# Patient Record
Sex: FEMALE | Race: WHITE | HISPANIC OR LATINO | Employment: PART TIME | URBAN - METROPOLITAN AREA
[De-identification: names, ages, dates, MRNs, and addresses within clinical notes are randomized per-mention and may not be internally consistent; named-entity substitution may affect disease eponyms.]

---

## 2017-01-26 DIAGNOSIS — E11.9 TYPE 2 DIABETES MELLITUS WITHOUT COMPLICATIONS (H): Primary | ICD-10-CM

## 2017-01-26 NOTE — TELEPHONE ENCOUNTER
Patient due for follow up DM appointment.  Please assist in scheduling and route back to refills.      LOV NOTES:6/30/2016  Assessment and Plan:      (E11.9) Type 2 diabetes mellitus without complications (H)  (primary encounter diagnosis)  Comment: she has been noncompliant with meds. She will sent up an AM/PM pill box and try to work on being better  Plan: f/u early Oct for a weight and A1c    Belinda Schwartz RN  Triage [employer]        Metformin         Last Written Prescription Date: 8/1/2016  Last Fill Quantity: 180, # refills: 1  Last Office Visit with [facility], [facility] or [facility] prescribing provider:  6/30/2016        BP Readings from Last 3 Encounters:   06/30/16 126/79   12/21/15 124/84   11/09/15 135/94     MICROL       10   6/29/2016  No results found for this basename: microalbumin  CREATININE   Date Value Ref Range Status   06/29/2016 0.65 0.52 - 1.04 mg/dL Final   ]  GFR ESTIMATE   Date Value Ref Range Status   06/29/2016 >90  Non  GFR Calc   >60 mL/min/1.7m2 Final   06/01/2015 >90  Non  GFR Calc   >60 mL/min/1.7m2 Final   11/21/2014 >90  Non  GFR Calc   >60 mL/min/1.7m2 Final     GFR ESTIMATE IF BLACK   Date Value Ref Range Status   06/29/2016 >90   GFR Calc   >60 mL/min/1.7m2 Final   06/01/2015 >90   GFR Calc   >60 mL/min/1.7m2 Final   11/21/2014 >90   GFR Calc   >60 mL/min/1.7m2 Final     CHOL      153   6/29/2016  HDL       61   6/29/2016  LDL       65   6/29/2016  TRIG      136   6/29/2016  CHOLHDLRATIO      3.1   6/1/2015  AST       24   6/29/2016  ALT       43   6/29/2016  A1C      6.8   9/27/2016  A1C      7.2   6/20/2016  A1C      7.1   12/18/2015  A1C      6.3   6/1/2015  A1C      6.7   11/21/2014  POTASSIUM   Date Value Ref Range Status   06/29/2016 4.2 3.4 - 5.3 mmol/L Final     Amaryl         Last Written Prescription Date: 8/1/2016  Last Fill Quantity: 90, # refills: 1  Last Office Visit with [facility],  [facility] or  [facility] prescribing provider:  6/30/2016        BP Readings from Last 3 Encounters:   06/30/16 126/79   12/21/15 124/84   11/09/15 135/94     MICROL       10   6/29/2016  No results found for this basename: microalbumin  CREATININE   Date Value Ref Range Status   06/29/2016 0.65 0.52 - 1.04 mg/dL Final   ]  GFR ESTIMATE   Date Value Ref Range Status   06/29/2016 >90  Non  GFR Calc   >60 mL/min/1.7m2 Final   06/01/2015 >90  Non  GFR Calc   >60 mL/min/1.7m2 Final   11/21/2014 >90  Non  GFR Calc   >60 mL/min/1.7m2 Final     GFR ESTIMATE IF BLACK   Date Value Ref Range Status   06/29/2016 >90   GFR Calc   >60 mL/min/1.7m2 Final   06/01/2015 >90   GFR Calc   >60 mL/min/1.7m2 Final   11/21/2014 >90   GFR Calc   >60 mL/min/1.7m2 Final     CHOL      153   6/29/2016  HDL       61   6/29/2016  LDL       65   6/29/2016  TRIG      136   6/29/2016  CHOLHDLRATIO      3.1   6/1/2015  AST       24   6/29/2016  ALT       43   6/29/2016  A1C      6.8   9/27/2016  A1C      7.2   6/20/2016  A1C      7.1   12/18/2015  A1C      6.3   6/1/2015  A1C      6.7   11/21/2014  POTASSIUM   Date Value Ref Range Status   06/29/2016 4.2 3.4 - 5.3 mmol/L Final

## 2017-01-30 ENCOUNTER — DOCUMENTATION ONLY (OUTPATIENT)
Dept: LAB | Facility: CLINIC | Age: 57
End: 2017-01-30

## 2017-01-30 DIAGNOSIS — E11.9 TYPE 2 DIABETES MELLITUS WITHOUT COMPLICATION, WITHOUT LONG-TERM CURRENT USE OF INSULIN (H): Primary | ICD-10-CM

## 2017-01-30 DIAGNOSIS — E78.5 HYPERLIPIDEMIA LDL GOAL <100: ICD-10-CM

## 2017-01-30 NOTE — PROGRESS NOTES
I did pend orders, but she needs to see me this month to review results as well.  Call and help her make appt

## 2017-01-31 DIAGNOSIS — E78.5 HYPERLIPIDEMIA LDL GOAL <100: ICD-10-CM

## 2017-01-31 DIAGNOSIS — E11.9 TYPE 2 DIABETES MELLITUS WITHOUT COMPLICATION, WITHOUT LONG-TERM CURRENT USE OF INSULIN (H): ICD-10-CM

## 2017-01-31 LAB
CHOLEST SERPL-MCNC: 165 MG/DL
HBA1C MFR BLD: 7.2 % (ref 4.3–6)
HDLC SERPL-MCNC: 59 MG/DL
LDLC SERPL CALC-MCNC: 75 MG/DL
NONHDLC SERPL-MCNC: 106 MG/DL
TRIGL SERPL-MCNC: 154 MG/DL

## 2017-01-31 PROCEDURE — 80061 LIPID PANEL: CPT | Performed by: PHYSICIAN ASSISTANT

## 2017-01-31 PROCEDURE — 83036 HEMOGLOBIN GLYCOSYLATED A1C: CPT | Performed by: PHYSICIAN ASSISTANT

## 2017-01-31 PROCEDURE — 36415 COLL VENOUS BLD VENIPUNCTURE: CPT | Performed by: PHYSICIAN ASSISTANT

## 2017-02-03 RX ORDER — GLIMEPIRIDE 1 MG/1
TABLET ORAL
Qty: 90 TABLET | Refills: 1 | Status: SHIPPED | OUTPATIENT
Start: 2017-02-03 | End: 2017-02-13 | Stop reason: DRUGHIGH

## 2017-02-03 NOTE — TELEPHONE ENCOUNTER
Patient scheduled for future appt with Luz Maria Nilam  Prescription approved per [facility] Refill Protocol.  Bernie Castro RN

## 2017-02-03 NOTE — TELEPHONE ENCOUNTER
Second request from pharmacy  Appointment with Ambrosio on 2/13/17  Please approve  RT Shahla (R)

## 2017-02-13 ENCOUNTER — OFFICE VISIT (OUTPATIENT)
Dept: FAMILY MEDICINE | Facility: CLINIC | Age: 57
End: 2017-02-13
Payer: COMMERCIAL

## 2017-02-13 VITALS
SYSTOLIC BLOOD PRESSURE: 122 MMHG | DIASTOLIC BLOOD PRESSURE: 84 MMHG | WEIGHT: 152.8 LBS | TEMPERATURE: 96.9 F | HEIGHT: 63 IN | HEART RATE: 92 BPM | BODY MASS INDEX: 27.07 KG/M2

## 2017-02-13 DIAGNOSIS — E11.9 TYPE 2 DIABETES MELLITUS WITHOUT COMPLICATION, WITHOUT LONG-TERM CURRENT USE OF INSULIN (H): Primary | ICD-10-CM

## 2017-02-13 PROCEDURE — 99213 OFFICE O/P EST LOW 20 MIN: CPT | Performed by: PHYSICIAN ASSISTANT

## 2017-02-13 RX ORDER — GLIMEPIRIDE 2 MG/1
2 TABLET ORAL
Qty: 30 TABLET | Refills: 3 | Status: SHIPPED | OUTPATIENT
Start: 2017-02-13 | End: 2017-07-23

## 2017-02-13 NOTE — PROGRESS NOTES
HPI: 55 yo female here for f/u diabetes  She is walking for exercise 2.8mi about 4 days per week  Her weight is up compared to summertime; likes more salty snacks later at night  No longer on [employer] and encouraged her to restart that.  She is checking her blood sugars and in the am running in the 160s  She is compliant now with taking her metformin and amaryl  She is due for her eye exam.  Denies any numbness or tingling in toes or fingers.  Denies sxs of hypoglycemia.    Past Medical History   Diagnosis Date     Atypical squamous cells of undetermined significance (ASCUS) on Papanicolaou smear of cervix 10/9/2015     GERD (gastroesophageal reflux disease)      HTN (hypertension)      Hyperlipidemia      Type 2 diabetes mellitus without complications (H) 10/22/2015     Past Surgical History   Procedure Laterality Date     C lap ovarian cystotomy  1976     salpingooopherectomy for large dermoid     Appendectomy  1976     at time of USO     Arthroscopy knee rt/lt       Colonoscopy N/A 11/9/2015     Procedure: COLONOSCOPY;  Surgeon: Kate Redmond MD;  Location:  GI     Social History   Substance Use Topics     Smoking status: Never Smoker     Smokeless tobacco: Never Used     Alcohol use No     Current Outpatient Prescriptions   Medication Sig Dispense Refill     glimepiride (AMARYL) 2 MG tablet Take 1 tablet (2 mg) by mouth every morning (before breakfast) 30 tablet 3     metFORMIN (GLUCOPHAGE) 1000 MG tablet TAKE ONE TABLET BY MOUTH TWICE A DAY WITH MEALS 180 tablet 1     [DISCONTINUED] glimepiride (AMARYL) 1 MG tablet TAKE ONE TABLET BY MOUTH EVERY MORNING BEFORE BREAKFAST 90 tablet 1     lisinopril (PRINIVIL,ZESTRIL) 10 MG tablet Take 0.5 tablets (5 mg) by mouth daily 45 tablet 2     simvastatin (ZOCOR) 40 MG tablet TAKE ONE AND ONE-HALF TABLETS BY MOUTH DAILY 135 tablet 2     blood glucose monitoring (NO BRAND SPECIFIED) test strip Test twice daily 1 Box 12     Urea 40 % CREA applt ohands and feet  "daily 198.6 g 11     OMEPRAZOLE PO        aspirin 81 MG tablet Take 1 tablet (81 mg) by mouth daily 90 tablet 3     Multiple Vitamin (MULTIVITAMIN OR) Take 1 tablet by mouth daily.       Allergies   Allergen Reactions     Keflex [Cephalexin Hcl] Hives     Penicillins Hives     Ragweeds      Sneezing etc.     FAMILY HISTORY NOTED AND REVIEWED    PHYSICAL EXAM:    /84 (BP Location: Right arm, Cuff Size: Adult Regular)  Pulse 92  Temp 96.9  F (36.1  C) (Oral)  Ht 5' 2.5\" (1.588 m)  Wt 152 lb 12.8 oz (69.3 kg)  BMI 27.5 kg/m2    Patient appears non toxic    Results for orders placed or performed in visit on 01/31/17   Hemoglobin A1c   Result Value Ref Range    Hemoglobin A1C 7.2 (H) 4.3 - 6.0 %   Lipid Profile   Result Value Ref Range    Cholesterol 165 <200 mg/dL    Triglycerides 154 (H) <150 mg/dL    HDL Cholesterol 59 >49 mg/dL    LDL Cholesterol Calculated 75 <100 mg/dL    Non HDL Cholesterol 106 <130 mg/dL         Assessment and Plan:     (E11.9) Type 2 diabetes mellitus without complication, without long-term current use of insulin (H)  (primary encounter diagnosis)  Comment: LDL at goal.  A1c went up with her wt gain. She will try to monitor diet more closely. Recd fewer carbs, more protein. Increased amaryl to 2mg qd.  Recheck A1c in 3 months. See me in 6 months.  Plan: glimepiride (AMARYL) 2 MG tablet, Hemoglobin         A1c              Luz Maria Demarco PA-C      "

## 2017-02-13 NOTE — NURSING NOTE
"Chief Complaint   Patient presents with     Results       Initial /84 (BP Location: Right arm, Cuff Size: Adult Regular)  Pulse 92  Temp 96.9  F (36.1  C) (Oral)  Ht 5' 2.5\" (1.588 m)  Wt 152 lb 12.8 oz (69.3 kg)  BMI 27.5 kg/m2 Estimated body mass index is 27.5 kg/(m^2) as calculated from the following:    Height as of this encounter: 5' 2.5\" (1.588 m).    Weight as of this encounter: 152 lb 12.8 oz (69.3 kg).  Medication Reconciliation: complete  "

## 2017-02-13 NOTE — MR AVS SNAPSHOT
After Visit Summary   2/13/2017    Rubi Nicolas    MRN: [id number]           Patient Information     Date Of Birth          1960        Visit Information        Provider Department      2/13/2017 9:30 AM Luz Maria Demarco PA-C [facility]        Today's Diagnoses     Type 2 diabetes mellitus without complication, without long-term current use of insulin (H)    -  1       Follow-ups after your visit        Future tests that were ordered for you today     Open Future Orders        Priority Expected Expires Ordered    Hemoglobin A1c Routine 5/15/2017 2/13/2018 2/13/2017            Who to contact     If you have questions or need follow up information about today's clinic visit or your schedule please contact [facility] directly at [phone].  Normal or non-critical lab and imaging results will be communicated to you by Gen9hart, letter or phone within 4 business days after the clinic has received the results. If you do not hear from us within 7 days, please contact the clinic through Gen9hart or phone. If you have a critical or abnormal lab result, we will notify you by phone as soon as possible.  Submit refill requests through Solyndra or call your pharmacy and they will forward the refill request to us. Please allow 3 business days for your refill to be completed.          Additional Information About Your Visit        MyChart Information     Solyndra gives you secure access to your electronic health record. If you see a primary care provider, you can also send messages to your care team and make appointments. If you have questions, please call your primary care clinic.  If you do not have a primary care provider, please call [phone] and they will assist you.        Care EveryWhere ID     This is your Care EveryWhere ID. This could be used by other organizations to access your [place] medical records  [id number]        Your Vitals Were     Pulse Temperature Height  "BMI (Body Mass Index)          92 96.9  F (36.1  C) (Oral) 5' 2.5\" (1.588 m) 27.5 kg/m2         Blood Pressure from Last 3 Encounters:   02/13/17 122/84   06/30/16 126/79   12/21/15 124/84    Weight from Last 3 Encounters:   02/13/17 152 lb 12.8 oz (69.3 kg)   06/30/16 151 lb (68.5 kg)   12/21/15 149 lb 6.4 oz (67.8 kg)                 Today's Medication Changes          These changes are accurate as of: 2/13/17 10:03 AM.  If you have any questions, ask your nurse or doctor.               These medicines have changed or have updated prescriptions.        Dose/Directions    * glimepiride 1 MG tablet   Commonly known as:  AMARYL   This may have changed:  Another medication with the same name was added. Make sure you understand how and when to take each.   Used for:  Type 2 diabetes mellitus without complications (H)   Changed by:  Luz Maria Demarco PA-C        TAKE ONE TABLET BY MOUTH EVERY MORNING BEFORE BREAKFAST   Quantity:  90 tablet   Refills:  1       * glimepiride 2 MG tablet   Commonly known as:  AMARYL   This may have changed:  You were already taking a medication with the same name, and this prescription was added. Make sure you understand how and when to take each.   Used for:  Type 2 diabetes mellitus without complication, without long-term current use of insulin (H)   Changed by:  Luz Maria Demarco PA-C        Dose:  2 mg   Take 1 tablet (2 mg) by mouth every morning (before breakfast)   Quantity:  30 tablet   Refills:  3       * Notice:  This list has 2 medication(s) that are the same as other medications prescribed for you. Read the directions carefully, and ask your doctor or other care provider to review them with you.         Where to get your medicines      These medications were sent to [facility] [id number] [facility] [place] - [address]  [address] [place] [address]     Phone:  [phone]     glimepiride 2 MG tablet                Primary Care Provider Office Phone # Fax #    Luz Maria Demarco, " BRANDON [phone]       [facility] [address] [facility] [address]  [facility] [address]        Thank you!     Thank you for choosing [facility]  for your care. Our goal is always to provide you with excellent care. Hearing back from our patients is one way we can continue to improve our services. Please take a few minutes to complete the written survey that you may receive in the mail after your visit with us. Thank you!             Your Updated Medication List - Protect others around you: Learn how to safely use, store and throw away your medicines at www.disposemymeds.org.          This list is accurate as of: 2/13/17 10:03 AM.  Always use your most recent med list.                   Brand Name Dispense Instructions for use    aspirin 81 MG tablet     90 tablet    Take 1 tablet (81 mg) by mouth daily       blood glucose monitoring test strip    no brand specified    1 Box    Test twice daily       * glimepiride 1 MG tablet    AMARYL    90 tablet    TAKE ONE TABLET BY MOUTH EVERY MORNING BEFORE BREAKFAST       * glimepiride 2 MG tablet    AMARYL    30 tablet    Take 1 tablet (2 mg) by mouth every morning (before breakfast)       lisinopril 10 MG tablet    PRINIVIL/ZESTRIL    45 tablet    Take 0.5 tablets (5 mg) by mouth daily       metFORMIN 1000 MG tablet    GLUCOPHAGE    180 tablet    TAKE ONE TABLET BY MOUTH TWICE A DAY WITH MEALS       MULTIVITAMIN PO      Take 1 tablet by mouth daily.       OMEPRAZOLE PO          simvastatin 40 MG tablet    ZOCOR    135 tablet    TAKE ONE AND ONE-HALF TABLETS BY MOUTH DAILY       Urea 40 % Crea     198.6 g    applt ohands and feet daily       * Notice:  This list has 2 medication(s) that are the same as other medications prescribed for you. Read the directions carefully, and ask your doctor or other care provider to review them with you.

## 2017-02-20 ENCOUNTER — HOSPITAL ENCOUNTER (OUTPATIENT)
Dept: MAMMOGRAPHY | Facility: CLINIC | Age: 57
Discharge: HOME | End: 2017-02-20
Attending: PHYSICIAN ASSISTANT | Admitting: PHYSICIAN ASSISTANT
Payer: COMMERCIAL

## 2017-02-20 DIAGNOSIS — Z12.31 VISIT FOR SCREENING MAMMOGRAM: ICD-10-CM

## 2017-02-20 PROCEDURE — G0202 SCR MAMMO BI INCL CAD: HCPCS

## 2017-05-19 DIAGNOSIS — I10 ESSENTIAL HYPERTENSION WITH GOAL BLOOD PRESSURE LESS THAN 140/90: ICD-10-CM

## 2017-05-19 DIAGNOSIS — E78.5 HYPERLIPIDEMIA LDL GOAL <100: ICD-10-CM

## 2017-05-19 RX ORDER — SIMVASTATIN 40 MG
TABLET ORAL
Qty: 135 TABLET | Refills: 2 | Status: SHIPPED | OUTPATIENT
Start: 2017-05-19 | End: 2018-02-07

## 2017-05-19 RX ORDER — LISINOPRIL 10 MG/1
TABLET ORAL
Qty: 45 TABLET | Refills: 2 | Status: SHIPPED | OUTPATIENT
Start: 2017-05-19 | End: 2018-02-07

## 2017-05-19 NOTE — TELEPHONE ENCOUNTER
Prescription approved per [facility] Refill Protocol.  Kriss Cordero RN

## 2017-05-19 NOTE — TELEPHONE ENCOUNTER
Pending Prescriptions:                       Disp   Refills    simvastatin (ZOCOR) 40 MG tablet [Pharmacy*135 ta*2        Sig: TAKE ONE AND ONE-HALF TABLETS BY MOUTH DAILY    lisinopril (PRINIVIL/ZESTRIL) 10 MG tablet*45 tab*2        Sig: TAKE ONE-HALF TABLET BY MOUTH DAILY       Simvastatin  Last Written Prescription Date: 08/30/2016  Last Fill Quantity: 135, # refills: 2  Last Office Visit with [facility] or [facility] prescribing provider: 02/13/2017       Lab Results   Component Value Date    CHOL 165 01/31/2017     Lab Results   Component Value Date    HDL 59 01/31/2017     Lab Results   Component Value Date    LDL 75 01/31/2017     Lab Results   Component Value Date    TRIG 154 01/31/2017     Lab Results   Component Value Date    CHOLHDLRATIO 3.1 06/01/2015     Lisinopril      Last Written Prescription Date: 08/31/2016  Last Fill Quantity: 45, # refills: 2  Last Office Visit with [facility], [facility] or [facility] prescribing provider: 02/13/2017       Potassium   Date Value Ref Range Status   06/29/2016 4.2 3.4 - 5.3 mmol/L Final     Creatinine   Date Value Ref Range Status   06/29/2016 0.65 0.52 - 1.04 mg/dL Final     BP Readings from Last 3 Encounters:   02/13/17 122/84   06/30/16 126/79   12/21/15 124/84

## 2017-05-24 ENCOUNTER — TELEPHONE (OUTPATIENT)
Dept: FAMILY MEDICINE | Facility: CLINIC | Age: 57
End: 2017-05-24

## 2017-05-24 NOTE — TELEPHONE ENCOUNTER
Reason for Call:  Lab orders    Detailed comments: Patient scheduled to come in for her A1C Labs  Please place orders in her Chart  Thank You    Phone Number Patient can be reached at: Home number on file [phone] (home)    Best Time: anytime    Can we leave a detailed message on this number? YES    Call taken on 5/24/2017 at 10:08 AM by Kodak Barahona

## 2017-05-30 DIAGNOSIS — E11.9 TYPE 2 DIABETES MELLITUS WITHOUT COMPLICATION, WITHOUT LONG-TERM CURRENT USE OF INSULIN (H): ICD-10-CM

## 2017-05-30 LAB — HBA1C MFR BLD: 7.1 % (ref 4.3–6)

## 2017-05-30 PROCEDURE — 83036 HEMOGLOBIN GLYCOSYLATED A1C: CPT | Performed by: PHYSICIAN ASSISTANT

## 2017-05-30 PROCEDURE — 36415 COLL VENOUS BLD VENIPUNCTURE: CPT | Performed by: PHYSICIAN ASSISTANT

## 2017-05-31 NOTE — PROGRESS NOTES
Rubi,    Your hemoglobin A1c is a little better at 7.1%.  I will see you in about 3 months.    Luz Maria Demarco PA-C

## 2017-07-23 DIAGNOSIS — E11.9 TYPE 2 DIABETES MELLITUS WITHOUT COMPLICATION, WITHOUT LONG-TERM CURRENT USE OF INSULIN (H): ICD-10-CM

## 2017-07-24 RX ORDER — GLIMEPIRIDE 2 MG/1
2 TABLET ORAL
Qty: 30 TABLET | Refills: 0 | Status: SHIPPED | OUTPATIENT
Start: 2017-07-24 | End: 2017-08-21

## 2017-07-24 NOTE — TELEPHONE ENCOUNTER
Pending Prescriptions:                       Disp   Refills    glimepiride (AMARYL) 2 MG tablet [Pharmac*30 tab*3            Sig: TAKE 1 TABLET BY MOUTH EVERY MORNING BEFORE           BREAKFAST.             Last Written Prescription Date: 2/13/17  Last Fill Quantity: 30, # refills: 3  Last Office Visit with [facility], [facility] or [facility] prescribing provider:  2/13/17        BP Readings from Last 3 Encounters:   02/13/17 122/84   06/30/16 126/79   12/21/15 124/84     Lab Results   Component Value Date    MICROL 10 06/29/2016     Lab Results   Component Value Date    UMALCR 8.58 06/29/2016     Creatinine   Date Value Ref Range Status   06/29/2016 0.65 0.52 - 1.04 mg/dL Final   ]  GFR Estimate   Date Value Ref Range Status   06/29/2016 >90  Non  GFR Calc   >60 mL/min/1.7m2 Final   06/01/2015 >90  Non  GFR Calc   >60 mL/min/1.7m2 Final   11/21/2014 >90  Non  GFR Calc   >60 mL/min/1.7m2 Final     GFR Estimate If Black   Date Value Ref Range Status   06/29/2016 >90   GFR Calc   >60 mL/min/1.7m2 Final   06/01/2015 >90   GFR Calc   >60 mL/min/1.7m2 Final   11/21/2014 >90   GFR Calc   >60 mL/min/1.7m2 Final     Lab Results   Component Value Date    CHOL 165 01/31/2017     Lab Results   Component Value Date    HDL 59 01/31/2017     Lab Results   Component Value Date    LDL 75 01/31/2017     Lab Results   Component Value Date    TRIG 154 01/31/2017     Lab Results   Component Value Date    CHOLHDLRATIO 3.1 06/01/2015     Lab Results   Component Value Date    AST 24 06/29/2016     Lab Results   Component Value Date    ALT 43 06/29/2016     Lab Results   Component Value Date    A1C 7.1 05/30/2017    A1C 7.2 01/31/2017    A1C 6.8 09/27/2016    A1C 7.2 06/20/2016    A1C 7.1 12/18/2015     Potassium   Date Value Ref Range Status   06/29/2016 4.2 3.4 - 5.3 mmol/L Final

## 2017-07-24 NOTE — TELEPHONE ENCOUNTER
Medication is being filled for 1 time refill only due to: patient is due for labs now. Patient will be due for office visit 8/2017.    Molly HENDERSON RN

## 2017-07-25 ENCOUNTER — OFFICE VISIT (OUTPATIENT)
Dept: FAMILY MEDICINE | Facility: CLINIC | Age: 57
End: 2017-07-25
Payer: COMMERCIAL

## 2017-07-25 VITALS
OXYGEN SATURATION: 98 % | WEIGHT: 149.7 LBS | TEMPERATURE: 99.2 F | DIASTOLIC BLOOD PRESSURE: 75 MMHG | BODY MASS INDEX: 26.52 KG/M2 | HEIGHT: 63 IN | SYSTOLIC BLOOD PRESSURE: 126 MMHG | HEART RATE: 84 BPM

## 2017-07-25 DIAGNOSIS — R30.0 DYSURIA: Primary | ICD-10-CM

## 2017-07-25 DIAGNOSIS — N30.01 ACUTE CYSTITIS WITH HEMATURIA: ICD-10-CM

## 2017-07-25 LAB
ALBUMIN UR-MCNC: NEGATIVE MG/DL
APPEARANCE UR: CLEAR
BILIRUB UR QL STRIP: NEGATIVE
COLOR UR AUTO: YELLOW
GLUCOSE UR STRIP-MCNC: 500 MG/DL
HGB UR QL STRIP: ABNORMAL
KETONES UR STRIP-MCNC: NEGATIVE MG/DL
LEUKOCYTE ESTERASE UR QL STRIP: ABNORMAL
NITRATE UR QL: NEGATIVE
NON-SQ EPI CELLS #/AREA URNS LPF: ABNORMAL /LPF
PH UR STRIP: 6 PH (ref 5–7)
RBC #/AREA URNS AUTO: ABNORMAL /HPF (ref 0–2)
SP GR UR STRIP: <=1.005 (ref 1–1.03)
URN SPEC COLLECT METH UR: ABNORMAL
UROBILINOGEN UR STRIP-ACNC: 0.2 EU/DL (ref 0.2–1)
WBC #/AREA URNS AUTO: ABNORMAL /HPF (ref 0–2)

## 2017-07-25 PROCEDURE — 81001 URINALYSIS AUTO W/SCOPE: CPT | Performed by: FAMILY MEDICINE

## 2017-07-25 PROCEDURE — 87088 URINE BACTERIA CULTURE: CPT | Performed by: FAMILY MEDICINE

## 2017-07-25 PROCEDURE — 99213 OFFICE O/P EST LOW 20 MIN: CPT | Performed by: FAMILY MEDICINE

## 2017-07-25 PROCEDURE — 87186 SC STD MICRODIL/AGAR DIL: CPT | Performed by: FAMILY MEDICINE

## 2017-07-25 PROCEDURE — 87086 URINE CULTURE/COLONY COUNT: CPT | Performed by: FAMILY MEDICINE

## 2017-07-25 RX ORDER — SULFAMETHOXAZOLE/TRIMETHOPRIM 800-160 MG
1 TABLET ORAL 2 TIMES DAILY
Qty: 6 TABLET | Refills: 0 | Status: SHIPPED | OUTPATIENT
Start: 2017-07-25 | End: 2017-07-28

## 2017-07-25 NOTE — MR AVS SNAPSHOT
"              After Visit Summary   7/25/2017    Rubi Nicolas    MRN: [id number]           Patient Information     Date Of Birth          1960        Visit Information        Provider Department      7/25/2017 10:30 AM Siomara Echevarria MD [facility]        Today's Diagnoses     Dysuria    -  1    Acute cystitis with hematuria           Follow-ups after your visit        Who to contact     If you have questions or need follow up information about today's clinic visit or your schedule please contact [facility] [place] directly at [phone].  Normal or non-critical lab and imaging results will be communicated to you by Stylythart, letter or phone within 4 business days after the clinic has received the results. If you do not hear from us within 7 days, please contact the clinic through Stylythart or phone. If you have a critical or abnormal lab result, we will notify you by phone as soon as possible.  Submit refill requests through Campus Job or call your pharmacy and they will forward the refill request to us. Please allow 3 business days for your refill to be completed.          Additional Information About Your Visit        MyChart Information     Campus Job gives you secure access to your electronic health record. If you see a primary care provider, you can also send messages to your care team and make appointments. If you have questions, please call your primary care clinic.  If you do not have a primary care provider, please call [phone] and they will assist you.        Care EveryWhere ID     This is your Care EveryWhere ID. This could be used by other organizations to access your [place] medical records  [id number]        Your Vitals Were     Pulse Temperature Height Pulse Oximetry BMI (Body Mass Index)       84 99.2  F (37.3  C) (Oral) 5' 2.5\" (1.588 m) 98% 26.94 kg/m2        Blood Pressure from Last 3 Encounters:   07/25/17 126/75   02/13/17 122/84   06/30/16 126/79    Weight from " Last 3 Encounters:   07/25/17 149 lb 11.2 oz (67.9 kg)   02/13/17 152 lb 12.8 oz (69.3 kg)   06/30/16 151 lb (68.5 kg)              We Performed the Following     UA with Microscopic reflex to Culture     Urine Culture Aerobic Bacterial          Today's Medication Changes          These changes are accurate as of: 7/25/17 11:59 PM.  If you have any questions, ask your nurse or doctor.               Start taking these medicines.        Dose/Directions    sulfamethoxazole-trimethoprim 800-160 MG per tablet   Commonly known as:  BACTRIM DS/SEPTRA DS   Used for:  Acute cystitis with hematuria   Started by:  Siomara Echevarria MD        Dose:  1 tablet   Take 1 tablet by mouth 2 times daily for 3 days   Quantity:  6 tablet   Refills:  0            Where to get your medicines      These medications were sent to [facility] [address] [facility] - [place], [place] - [address]  [address], [place] [address]     Phone:  [phone]     sulfamethoxazole-trimethoprim 800-160 MG per tablet                Primary Care Provider Office Phone # Fax #    Luz Maria Demarco PA-C [phone]       [facility] [place] [address]  [place] [address]        Equal Access to Services     ABRIL ODONNELL AH: Hadii laura ku hadasho Soomaali, waaxda luqadaha, qaybta kaalmada adeegyada, waxay idiin haytim anderson. So [facility] [phone].    ATENCIÓN: Si habla español, tiene a king disposición servicios gratuitos de asistencia lingüística. Llame al [phone].    We comply with applicable federal civil rights laws and [place] laws. We do not discriminate on the basis of race, color, national origin, age, disability sex, sexual orientation or gender identity.            Thank you!     Thank you for choosing [facility]  for your care. Our goal is always to provide you with excellent care. Hearing back from our patients is one way we can continue to improve our services. Please take a few minutes to complete  the written survey that you may receive in the mail after your visit with us. Thank you!             Your Updated Medication List - Protect others around you: Learn how to safely use, store and throw away your medicines at www.disposemymeds.org.          This list is accurate as of: 7/25/17 11:59 PM.  Always use your most recent med list.                   Brand Name Dispense Instructions for use Diagnosis    aspirin 81 MG tablet     90 tablet    Take 1 tablet (81 mg) by mouth daily        blood glucose monitoring test strip    no brand specified    1 Box    Test twice daily    Type 2 diabetes, HbA1c goal < 7% (H)       glimepiride 2 MG tablet    AMARYL    30 tablet    Take 1 tablet (2 mg) by mouth every morning (before breakfast) Please schedule office visit to discuss further refills    Type 2 diabetes mellitus without complication, without long-term current use of insulin (H)       lisinopril 10 MG tablet    PRINIVIL/ZESTRIL    45 tablet    TAKE ONE-HALF TABLET BY MOUTH DAILY    Essential hypertension with goal blood pressure less than 140/90       MULTIVITAMIN PO      Take 1 tablet by mouth daily.        OMEPRAZOLE PO           simvastatin 40 MG tablet    ZOCOR    135 tablet    TAKE ONE AND ONE-HALF TABLETS BY MOUTH DAILY    Hyperlipidemia LDL goal <100       sulfamethoxazole-trimethoprim 800-160 MG per tablet    BACTRIM DS/SEPTRA DS    6 tablet    Take 1 tablet by mouth 2 times daily for 3 days    Acute cystitis with hematuria       Urea 40 % Crea     198.6 g    applt ohands and feet daily    Palmoplantar keratoderma

## 2017-07-25 NOTE — NURSING NOTE
"Chief Complaint   Patient presents with     UTI       Initial /75  Pulse 84  Temp 99.2  F (37.3  C) (Oral)  Ht 5' 2.5\" (1.588 m)  Wt 149 lb 11.2 oz (67.9 kg)  SpO2 98%  BMI 26.94 kg/m2 Estimated body mass index is 26.94 kg/(m^2) as calculated from the following:    Height as of this encounter: 5' 2.5\" (1.588 m).    Weight as of this encounter: 149 lb 11.2 oz (67.9 kg).  Medication Reconciliation: complete      Health Maintenance that is potentially due pending provider review:  NONE    n/a    EFRAÍN Wang  "

## 2017-07-25 NOTE — PROGRESS NOTES
SUBJECTIVE:                                                    Rubi Nicolas is a 56 year old female who presents to clinic today for the following health issues:      URINARY TRACT SYMPTOMS      Duration: Started this morning     Description  dysuria, frequency, urgency and back pain    Intensity:  mild    Accompanying signs and symptoms:  Fever/chills: no   Flank pain no   Nausea and vomiting: no   Vaginal symptoms: none  Abdominal/Pelvic Pain: no     History  History of frequent UTI's: no, had one last summer    History of kidney stones: no   Sexually Active: YES  Possibility of pregnancy: No    Precipitating or alleviating factors: None    Therapies tried and outcome: increase fluid intake      Back pain- minimal, diffuse.    Sx's last year- similar sx's.  Spasm bladder sx's, hurts a bit when she pees.  Woke up last night at 3am, then really increased her water intake, so increased freq.    No fevers/chills.  Stools normal.  Sexually active- Sunday.  Correlated last time.  Also biking with pading/sweating- 40 miles yesterday and last week.        Problem list and histories reviewed & adjusted, as indicated.  Additional history: as documented    Patient Active Problem List   Diagnosis     HTN (hypertension)     Hyperlipidemia LDL goal <100     GERD (gastroesophageal reflux disease)     Overweight (BMI 25.0-29.9)     Abnormal AST and ALT     Obesity     Atypical squamous cells of undetermined significance (ASCUS) on Papanicolaou smear of cervix     Type 2 diabetes mellitus without complications (H)      Current Outpatient Prescriptions   Medication Sig Dispense Refill     glimepiride (AMARYL) 2 MG tablet Take 1 tablet (2 mg) by mouth every morning (before breakfast) Please schedule office visit to discuss further refills 30 tablet 0     simvastatin (ZOCOR) 40 MG tablet TAKE ONE AND ONE-HALF TABLETS BY MOUTH DAILY 135 tablet 2     lisinopril (PRINIVIL/ZESTRIL) 10 MG tablet TAKE ONE-HALF TABLET BY MOUTH DAILY 45  tablet 2     blood glucose monitoring (NO BRAND SPECIFIED) test strip Test twice daily 1 Box 12     Urea 40 % CREA applt ohands and feet daily 198.6 g 11     OMEPRAZOLE PO        aspirin 81 MG tablet Take 1 tablet (81 mg) by mouth daily 90 tablet 3     Multiple Vitamin (MULTIVITAMIN OR) Take 1 tablet by mouth daily.       metFORMIN (GLUCOPHAGE) 1000 MG tablet Take 1 tablet (1,000 mg) by mouth 2 times daily (with meals) . Due for 6 month diabetes follow up. Call [phone] to schedule. 60 tablet 0     ciprofloxacin (CIPRO) 500 MG tablet Take 1 tablet (500 mg) by mouth 2 times daily 6 tablet 0     Allergies   Allergen Reactions     Keflex [Cephalexin Hcl] Hives     Penicillins Hives     Ragweeds      Sneezing etc.     Recent Labs   Lab Test  05/30/17   0941  01/31/17   0812  09/27/16   0758  06/29/16   1012   06/01/15   0752  12/01/14   1538  11/21/14   0918   10/19/12   0858   A1C  7.1*  7.2*  6.8*   --    < >  6.3*   --   6.7*   < >  7.3*   LDL   --   75   --   65   --   88   --   73   < >  93   HDL   --   59   --   61   --   55   --   52   < >  57   TRIG   --   154*   --   136   --   129   --   134   < >  141   ALT   --    --    --   43   --   27   --   22   < >   --    CR   --    --    --   0.65   --   0.63   --   0.62   < >  0.70   GFRESTIMATED   --    --    --   >90  Non  GFR Calc     --   >90  Non  GFR Calc     --   >90  Non  GFR Calc     < >  88   GFRESTBLACK   --    --    --   >90   GFR Calc     --   >90   GFR Calc     --   >90   GFR Calc     < >  >90   POTASSIUM   --    --    --   4.2   --   4.3   --   4.3   < >  4.2   TSH   --    --    --    --    --    --   3.28   --    --   3.78    < > = values in this interval not displayed.      Labs reviewed in EPIC      Reviewed and updated as needed this visit by clinical staffTobacco  Allergies  Meds  Problems       Reviewed and updated as needed this visit by  "Provider  Meds  Problems         ROS:  Constitutional, HEENT, cardiovascular, pulmonary, gi and gu systems are negative, except as otherwise noted.      OBJECTIVE:   /75  Pulse 84  Temp 99.2  F (37.3  C) (Oral)  Ht 5' 2.5\" (1.588 m)  Wt 149 lb 11.2 oz (67.9 kg)  SpO2 98%  BMI 26.94 kg/m2  Body mass index is 26.94 kg/(m^2).  GENERAL APPEARANCE: healthy, alert and no distress     EYES: PERRL, sclera clear     HENT: nose and mouth without ulcers or lesions     NECK: no adenopathy, no asymmetry, masses, or scars and thyroid normal to palpation     RESP: lungs clear to auscultation - no rales, rhonchi or wheezes     CV: regular rates and rhythm, normal S1 S2, no S3 or S4 and no murmur, click or rub      Abdomen: soft, nontender, no HSM or masses and bowel sounds normal     Ext: warm, dry, no edema      Back: no flank pain to percussion    Diagnostic Test Results:  Results for orders placed or performed in visit on 07/25/17   UA with Microscopic reflex to Culture   Result Value Ref Range    Color Urine Yellow     Appearance Urine Clear     Glucose Urine 500 (A) NEG mg/dL    Bilirubin Urine Negative NEG    Ketones Urine Negative NEG mg/dL    Specific Gravity Urine <=1.005 1.003 - 1.035    pH Urine 6.0 5.0 - 7.0 pH    Protein Albumin Urine Negative NEG mg/dL    Urobilinogen Urine 0.2 0.2 - 1.0 EU/dL    Nitrite Urine Negative NEG    Blood Urine Large (A) NEG    Leukocyte Esterase Urine Trace (A) NEG    Source Midstream Urine     WBC Urine O - 2 0 - 2 /HPF    RBC Urine 10-25 (A) 0 - 2 /HPF    Squamous Epithelial /LPF Urine Few FEW /LPF   Urine Culture Aerobic Bacterial   Result Value Ref Range    Specimen Description Midstream Urine     Culture Micro (A)      >100,000 colonies/mL Coagulase negative Staphylococcus    Micro Report Status FINAL 07/27/2017     Organism:       >100,000 colonies/mL Coagulase negative Staphylococcus       Susceptibility    >100,000 colonies/ml coagulase negative staphylococcus (marianna) -  " (no method available)     CIPROFLOXACIN <=0.5 Susceptible  ug/mL     GENTAMICIN <=0.5 Susceptible  ug/mL     LEVOFLOXACIN 0.5 Susceptible  ug/mL     NITROFURANTOIN <=16 Susceptible  ug/mL     OXACILLIN <=0.25 Susceptible  ug/mL     PENICILLIN 0.25 Resistant  ug/mL     TETRACYCLINE <=1 Susceptible  ug/mL     VANCOMYCIN <=0.5 Susceptible  ug/mL       ASSESSMENT/PLAN:       ICD-10-CM    1. Dysuria R30.0 UA with Microscopic reflex to Culture     Urine Culture Aerobic Bacterial     *UA reflex to Microscopic and Culture ([place] [facility] (except [place] and [place])   2. Acute cystitis with hematuria N30.01 sulfamethoxazole-trimethoprim (BACTRIM DS/SEPTRA DS) 800-160 MG per tablet     Acute cystitis sx's of frequency and bladder spasms, similar to last year with positive UA/UC (and txt with cipro effective), but now with mostly RBC's and no WBC's on UA.  No other cause of sx's seems likely, however, based on sx's or exam.  Discussed issues with pt- would be willing to send in abx rx to start in case sx's worsen, but could also wait and recheck UA tomorrow to see if then more c/w with UTI.  Sent in rx for bactrim, but also placed future UA order in case she'd like to wait.    Risks and benefits of medication(s) including potential side effects reviewed with patient.  Questions answered.     Siomara Echevarria MD  [facility]

## 2017-07-27 ENCOUNTER — TELEPHONE (OUTPATIENT)
Dept: FAMILY MEDICINE | Facility: CLINIC | Age: 57
End: 2017-07-27

## 2017-07-27 DIAGNOSIS — N30.01 ACUTE CYSTITIS WITH HEMATURIA: Primary | ICD-10-CM

## 2017-07-27 DIAGNOSIS — E11.9 TYPE 2 DIABETES MELLITUS WITHOUT COMPLICATIONS (H): ICD-10-CM

## 2017-07-27 LAB
BACTERIA SPEC CULT: ABNORMAL
MICRO REPORT STATUS: ABNORMAL
MICROORGANISM SPEC CULT: ABNORMAL
SPECIMEN SOURCE: ABNORMAL

## 2017-07-27 RX ORDER — CIPROFLOXACIN 500 MG/1
500 TABLET, FILM COATED ORAL 2 TIMES DAILY
Qty: 6 TABLET | Refills: 0 | Status: SHIPPED | OUTPATIENT
Start: 2017-07-27 | End: 2017-09-27

## 2017-07-27 NOTE — PROGRESS NOTES
Called and discussed ucx results-   UA with negative WBCs, but UCx positive now for staph organism and is susceptible to cipro and other abx (but bactrim not tested).  Pt states she just took the first bactrim an hour ago due to persistent, low-grade sx's.    Rec stopping the bactrim, and starting a cipro course- will do BID x 3 days.  Send to pharmacy- Risks and benefits of medication(s) including potential side effects reviewed with patient.  Questions answered.   Call/rtc if sx's not improving.  CW

## 2017-07-28 NOTE — TELEPHONE ENCOUNTER
Patient is due for 6 month diabetes check  I called and left VM for patient to call clinic back.   Sent prescription for 30 day supply. Patient due for 6 months diabetes check.    Bernie Castro RN

## 2017-08-21 DIAGNOSIS — E11.9 TYPE 2 DIABETES MELLITUS WITHOUT COMPLICATION, WITHOUT LONG-TERM CURRENT USE OF INSULIN (H): ICD-10-CM

## 2017-08-21 NOTE — TELEPHONE ENCOUNTER
Pending Prescriptions:                       Disp   Refills    glimepiride (AMARYL) 2 MG tablet [Pharmac*30 tab*0            Sig: TAKE 1 TABLET BY MOUTH EVERY MORNING BEFORE           BREAKFAST. MAKE APPT FOR FURTHER REFILLS.             Last Written Prescription Date: 7/24/17  Last Fill Quantity: 30, # refills: 0  Last Office Visit with [facility], [facility] or [facility] prescribing provider:  2/13/17        BP Readings from Last 3 Encounters:   07/25/17 126/75   02/13/17 122/84   06/30/16 126/79     Lab Results   Component Value Date    MICROL 10 06/29/2016     Lab Results   Component Value Date    UMALCR 8.58 06/29/2016     Creatinine   Date Value Ref Range Status   06/29/2016 0.65 0.52 - 1.04 mg/dL Final   ]  GFR Estimate   Date Value Ref Range Status   06/29/2016 >90  Non  GFR Calc   >60 mL/min/1.7m2 Final   06/01/2015 >90  Non  GFR Calc   >60 mL/min/1.7m2 Final   11/21/2014 >90  Non  GFR Calc   >60 mL/min/1.7m2 Final     GFR Estimate If Black   Date Value Ref Range Status   06/29/2016 >90   GFR Calc   >60 mL/min/1.7m2 Final   06/01/2015 >90   GFR Calc   >60 mL/min/1.7m2 Final   11/21/2014 >90   GFR Calc   >60 mL/min/1.7m2 Final     Lab Results   Component Value Date    CHOL 165 01/31/2017     Lab Results   Component Value Date    HDL 59 01/31/2017     Lab Results   Component Value Date    LDL 75 01/31/2017     Lab Results   Component Value Date    TRIG 154 01/31/2017     Lab Results   Component Value Date    CHOLHDLRATIO 3.1 06/01/2015     Lab Results   Component Value Date    AST 24 06/29/2016     Lab Results   Component Value Date    ALT 43 06/29/2016     Lab Results   Component Value Date    A1C 7.1 05/30/2017    A1C 7.2 01/31/2017    A1C 6.8 09/27/2016    A1C 7.2 06/20/2016    A1C 7.1 12/18/2015     Potassium   Date Value Ref Range Status   06/29/2016 4.2 3.4 - 5.3 mmol/L Final

## 2017-08-22 RX ORDER — GLIMEPIRIDE 2 MG/1
TABLET ORAL
Qty: 30 TABLET | Refills: 1 | Status: SHIPPED | OUTPATIENT
Start: 2017-08-22 | End: 2017-09-27

## 2017-08-22 NOTE — TELEPHONE ENCOUNTER
PCP wants to see patient early September.  Prescription approved per [facility] Refill Protocol.  Madeleine Aceves RN

## 2017-09-18 DIAGNOSIS — E11.9 TYPE 2 DIABETES MELLITUS WITHOUT COMPLICATION, WITHOUT LONG-TERM CURRENT USE OF INSULIN (H): ICD-10-CM

## 2017-09-18 LAB
ANION GAP SERPL CALCULATED.3IONS-SCNC: 14 MMOL/L (ref 3–14)
BUN SERPL-MCNC: 13 MG/DL (ref 7–30)
CALCIUM SERPL-MCNC: 9.5 MG/DL (ref 8.5–10.1)
CHLORIDE SERPL-SCNC: 104 MMOL/L (ref 94–109)
CO2 SERPL-SCNC: 22 MMOL/L (ref 20–32)
CREAT SERPL-MCNC: 0.64 MG/DL (ref 0.52–1.04)
GFR SERPL CREATININE-BSD FRML MDRD: >90 ML/MIN/1.7M2
GLUCOSE SERPL-MCNC: 175 MG/DL (ref 70–99)
HBA1C MFR BLD: 7.3 % (ref 4.3–6)
POTASSIUM SERPL-SCNC: 4.4 MMOL/L (ref 3.4–5.3)
SODIUM SERPL-SCNC: 140 MMOL/L (ref 133–144)
T4 FREE SERPL-MCNC: 1.15 NG/DL (ref 0.76–1.46)
TSH SERPL DL<=0.005 MIU/L-ACNC: 4.78 MU/L (ref 0.4–4)

## 2017-09-18 PROCEDURE — 83036 HEMOGLOBIN GLYCOSYLATED A1C: CPT | Performed by: PHYSICIAN ASSISTANT

## 2017-09-18 PROCEDURE — 80048 BASIC METABOLIC PNL TOTAL CA: CPT | Performed by: PHYSICIAN ASSISTANT

## 2017-09-18 PROCEDURE — 84443 ASSAY THYROID STIM HORMONE: CPT | Performed by: PHYSICIAN ASSISTANT

## 2017-09-18 PROCEDURE — 36415 COLL VENOUS BLD VENIPUNCTURE: CPT | Performed by: PHYSICIAN ASSISTANT

## 2017-09-18 PROCEDURE — 84439 ASSAY OF FREE THYROXINE: CPT | Performed by: PHYSICIAN ASSISTANT

## 2017-09-27 ENCOUNTER — OFFICE VISIT (OUTPATIENT)
Dept: FAMILY MEDICINE | Facility: CLINIC | Age: 57
End: 2017-09-27
Payer: COMMERCIAL

## 2017-09-27 VITALS
WEIGHT: 150 LBS | OXYGEN SATURATION: 100 % | BODY MASS INDEX: 26.58 KG/M2 | HEIGHT: 63 IN | TEMPERATURE: 97.6 F | HEART RATE: 83 BPM | SYSTOLIC BLOOD PRESSURE: 125 MMHG | DIASTOLIC BLOOD PRESSURE: 74 MMHG

## 2017-09-27 DIAGNOSIS — R79.89 ELEVATED TSH: ICD-10-CM

## 2017-09-27 DIAGNOSIS — Z23 NEED FOR PROPHYLACTIC VACCINATION AND INOCULATION AGAINST INFLUENZA: ICD-10-CM

## 2017-09-27 DIAGNOSIS — E11.9 TYPE 2 DIABETES MELLITUS WITHOUT COMPLICATION, WITHOUT LONG-TERM CURRENT USE OF INSULIN (H): Primary | ICD-10-CM

## 2017-09-27 DIAGNOSIS — L30.9 ECZEMA, UNSPECIFIED TYPE: ICD-10-CM

## 2017-09-27 PROCEDURE — 99214 OFFICE O/P EST MOD 30 MIN: CPT | Mod: 25 | Performed by: PHYSICIAN ASSISTANT

## 2017-09-27 PROCEDURE — 90686 IIV4 VACC NO PRSV 0.5 ML IM: CPT | Performed by: PHYSICIAN ASSISTANT

## 2017-09-27 PROCEDURE — 90471 IMMUNIZATION ADMIN: CPT | Performed by: PHYSICIAN ASSISTANT

## 2017-09-27 RX ORDER — GLIMEPIRIDE 4 MG/1
TABLET ORAL
Qty: 90 TABLET | Refills: 1 | Status: SHIPPED | OUTPATIENT
Start: 2017-09-27 | End: 2018-01-15

## 2017-09-27 RX ORDER — TRIAMCINOLONE ACETONIDE 1 MG/G
CREAM TOPICAL
Qty: 30 G | Refills: 3 | Status: ON HOLD | OUTPATIENT
Start: 2017-09-27 | End: 2022-02-26

## 2017-09-27 NOTE — PROGRESS NOTES
Injectable Influenza Immunization Documentation    1.  Is the person to be vaccinated sick today?   No    2. Does the person to be vaccinated have an allergy to a component   of the vaccine?   No    3. Has the person to be vaccinated ever had a serious reaction   to influenza vaccine in the past?   No    4. Has the person to be vaccinated ever had Guillain-Barré syndrome?   No    Form completed by Tiara Vargas CMA

## 2017-09-27 NOTE — NURSING NOTE
Prior to injection verified patient identity using patient's name and date of birth.  Screening Questionnaire for Adult Immunization    Are you sick today?   No   Do you have allergies to medications, food, a vaccine component or latex?   No   Have you ever had a serious reaction after receiving a vaccination?   No   Do you have a long-term health problem with heart disease, lung disease, asthma, kidney disease, metabolic disease (e.g. diabetes), anemia, or other blood disorder?   No   Do you have cancer, leukemia, HIV/AIDS, or any other immune system problem?   No   In the past 3 months, have you taken medications that affect  your immune system, such as prednisone, other steroids, or anticancer drugs; drugs for the treatment of rheumatoid arthritis, Crohn s disease, or psoriasis; or have you had radiation treatments?   No   Have you had a seizure, or a brain or other nervous system problem?   No   During the past year, have you received a transfusion of blood or blood     products, or been given immune (gamma) globulin or antiviral drug?   No   For women: Are you pregnant or is there a chance you could become        pregnant during the next month?   No   Have you received any vaccinations in the past 4 weeks?   No     Immunization questionnaire answers were all negative.        Per orders of Luz Maria Demarco, injection of Flu shot given by Tiara Vargas. Patient instructed to remain in clinic for 15 minutes afterwards, and to report any adverse reaction to me immediately.       Screening performed by Tiara Vargas on 9/27/2017 at 4:24 PM.

## 2017-09-27 NOTE — PROGRESS NOTES
HPI: This is a 57 yo female here for f/u DM  She is trying to follow a [employer] diet  She walks her dog for exercise (getting 10,000 steps most days)  She did have her eye exam in feb 2017  Denies any numbness or tingling in feet    Lab Results   Component Value Date    A1C 7.3 09/18/2017    A1C 7.1 05/30/2017    A1C 7.2 01/31/2017    A1C 6.8 09/27/2016    A1C 7.2 06/20/2016     Past Medical History:   Diagnosis Date     Atypical squamous cells of undetermined significance (ASCUS) on Papanicolaou smear of cervix 10/9/2015     GERD (gastroesophageal reflux disease)      HTN (hypertension)      Hyperlipidemia      Type 2 diabetes mellitus without complications (H) 10/22/2015     Past Surgical History:   Procedure Laterality Date     APPENDECTOMY  1976    at time of USO     ARTHROSCOPY KNEE RT/LT       C LAP OVARIAN CYSTOTOMY  1976    salpingooopherectomy for large dermoid     COLONOSCOPY N/A 11/9/2015    Procedure: COLONOSCOPY;  Surgeon: Kate Redmond MD;  Location:  GI     Social History   Substance Use Topics     Smoking status: Never Smoker     Smokeless tobacco: Never Used     Alcohol use No     Current Outpatient Prescriptions   Medication Sig Dispense Refill     metFORMIN (GLUCOPHAGE) 1000 MG tablet Take 1 tablet (1,000 mg) by mouth 2 times daily (with meals) 180 tablet 1     glimepiride (AMARYL) 4 MG tablet TAKE 1 TABLET BY MOUTH EVERY MORNING BEFORE BREAKFAST. 90 tablet 1     triamcinolone (KENALOG) 0.1 % cream Apply sparingly to affected area three times daily for 14 days. 30 g 3     simvastatin (ZOCOR) 40 MG tablet TAKE ONE AND ONE-HALF TABLETS BY MOUTH DAILY 135 tablet 2     lisinopril (PRINIVIL/ZESTRIL) 10 MG tablet TAKE ONE-HALF TABLET BY MOUTH DAILY 45 tablet 2     blood glucose monitoring (NO BRAND SPECIFIED) test strip Test twice daily 1 Box 12     Urea 40 % CREA applt ohands and feet daily 198.6 g 11     OMEPRAZOLE PO        aspirin 81 MG tablet Take 1 tablet (81 mg) by mouth daily 90  "tablet 3     Multiple Vitamin (MULTIVITAMIN OR) Take 1 tablet by mouth daily.       Allergies   Allergen Reactions     Keflex [Cephalexin Hcl] Hives     Penicillins Hives     Ragweeds      Sneezing etc.     FAMILY HISTORY NOTED AND REVIEWED    REVIEW OF SYSTEMS: occas feels hypoglycemic if BS in the 80s and will eat something and feel better.    PHYSICAL EXAM:    /74 (BP Location: Right arm, Patient Position: Chair, Cuff Size: Adult Regular)  Pulse 83  Temp 97.6  F (36.4  C) (Tympanic)  Ht 5' 2.5\" (1.588 m)  Wt 150 lb (68 kg)  SpO2 100%  BMI 27 kg/m2    Patient appears non toxic  Lungs: CTA bilat  Heart: RRR without m/r/g  Neck: no carotid bruits.  Extr: no edema.    Results for orders placed or performed in visit on 09/18/17   TSH with free T4 reflex   Result Value Ref Range    TSH 4.78 (H) 0.40 - 4.00 mU/L   Basic metabolic panel   Result Value Ref Range    Sodium 140 133 - 144 mmol/L    Potassium 4.4 3.4 - 5.3 mmol/L    Chloride 104 94 - 109 mmol/L    Carbon Dioxide 22 20 - 32 mmol/L    Anion Gap 14 3 - 14 mmol/L    Glucose 175 (H) 70 - 99 mg/dL    Urea Nitrogen 13 7 - 30 mg/dL    Creatinine 0.64 0.52 - 1.04 mg/dL    GFR Estimate >90 >60 mL/min/1.7m2    GFR Estimate If Black >90 >60 mL/min/1.7m2    Calcium 9.5 8.5 - 10.1 mg/dL   Hemoglobin A1c   Result Value Ref Range    Hemoglobin A1C 7.3 (H) 4.3 - 6.0 %   T4 free   Result Value Ref Range    T4 Free 1.15 0.76 - 1.46 ng/dL         Assessment and Plan:     (E11.9) Type 2 diabetes mellitus without complication, without long-term current use of insulin (H)  (primary encounter diagnosis)  Comment: not at goal of <7%. Increased amaryl to 4mg qd.  Cont to monitor BS and return for lab only in 12 weeks to recheck A1c. Cont to exercise and follow a healthy diet.  Plan: metFORMIN (GLUCOPHAGE) 1000 MG tablet,         glimepiride (AMARYL) 4 MG tablet, Hemoglobin         A1c, Albumin Random Urine Quantitative with         Creat Ratio            (R94.6) Elevated " TSH  Comment: does not have hx of hypothyroidism and since this is borderline and pt asymptomatic, will recheck in 12 weeks as well  Plan: TSH with free T4 reflex            (L30.9) Eczema, unspecified type  Comment: feet  Plan: triamcinolone (KENALOG) 0.1 % cream        Bid x 14 d prn    (Z23) Need for prophylactic vaccination and inoculation against influenza  Comment:   Plan: FLU VAC, SPLIT VIRUS IM > 3 YO (QUADRIVALENT)         [83399], Vaccine Administration, Initial         [06847]              Luz Maria Demarco PA-C

## 2017-09-27 NOTE — MR AVS SNAPSHOT
After Visit Summary   9/27/2017    Rubi Nicolas    MRN: [id number]           Patient Information     Date Of Birth          1960        Visit Information        Provider Department      9/27/2017 3:30 PM Luz Maria Demarco PA-C [facility]        Today's Diagnoses     Type 2 diabetes mellitus without complication, without long-term current use of insulin (H)    -  1    Elevated TSH        Eczema, unspecified type           Follow-ups after your visit        Future tests that were ordered for you today     Open Future Orders        Priority Expected Expires Ordered    Albumin Random Urine Quantitative with Creat Ratio Routine 12/27/2017 9/27/2018 9/27/2017    TSH with free T4 reflex Routine 12/27/2017 9/27/2018 9/27/2017    Hemoglobin A1c Routine 12/27/2017 9/27/2018 9/27/2017            Who to contact     If you have questions or need follow up information about today's clinic visit or your schedule please contact [facility] directly at [phone].  Normal or non-critical lab and imaging results will be communicated to you by [employer], letter or phone within 4 business days after the clinic has received the results. If you do not hear from us within 7 days, please contact the clinic through R17 or phone. If you have a critical or abnormal lab result, we will notify you by phone as soon as possible.  Submit refill requests through R17 or call your pharmacy and they will forward the refill request to us. Please allow 3 business days for your refill to be completed.          Additional Information About Your Visit        [employer] Information     R17 gives you secure access to your electronic health record. If you see a primary care provider, you can also send messages to your care team and make appointments. If you have questions, please call your primary care clinic.  If you do not have a primary care provider, please call [phone] and they will assist you.    "     Care EveryWhere ID     This is your Care EveryWhere ID. This could be used by other organizations to access your [facility] medical records  [id number]        Your Vitals Were     Pulse Temperature Height Pulse Oximetry BMI (Body Mass Index)       83 97.6  F (36.4  C) (Tympanic) 5' 2.5\" (1.588 m) 100% 27 kg/m2        Blood Pressure from Last 3 Encounters:   09/27/17 125/74   07/25/17 126/75   02/13/17 122/84    Weight from Last 3 Encounters:   09/27/17 150 lb (68 kg)   07/25/17 149 lb 11.2 oz (67.9 kg)   02/13/17 152 lb 12.8 oz (69.3 kg)                 Today's Medication Changes          These changes are accurate as of: 9/27/17  3:59 PM.  If you have any questions, ask your nurse or doctor.               Start taking these medicines.        Dose/Directions    triamcinolone 0.1 % cream   Commonly known as:  KENALOG   Used for:  Eczema, unspecified type   Started by:  Luz Maria Demarco PA-C        Apply sparingly to affected area three times daily for 14 days.   Quantity:  30 g   Refills:  3         These medicines have changed or have updated prescriptions.        Dose/Directions    glimepiride 4 MG tablet   Commonly known as:  AMARYL   This may have changed:  See the new instructions.   Used for:  Type 2 diabetes mellitus without complication, without long-term current use of insulin (H)   Changed by:  Luz Maria Demarco PA-C        TAKE 1 TABLET BY MOUTH EVERY MORNING BEFORE BREAKFAST.   Quantity:  90 tablet   Refills:  1       metFORMIN 1000 MG tablet   Commonly known as:  GLUCOPHAGE   This may have changed:  additional instructions   Used for:  Type 2 diabetes mellitus without complication, without long-term current use of insulin (H)   Changed by:  Luz Maria Demarco PA-C        Dose:  1000 mg   Take 1 tablet (1,000 mg) by mouth 2 times daily (with meals)   Quantity:  180 tablet   Refills:  1            Where to get your medicines      These medications were sent to [facility] [place] [address] " [address]  [address] [place] [address]     Phone:  [phone]     glimepiride 4 MG tablet    metFORMIN 1000 MG tablet    triamcinolone 0.1 % cream                Primary Care Provider Office Phone # Fax #    Luz Maria Demarco PA-C [phone] [address]  [place] [address]        Equal Access to Services     ABRIL ODONNELL : Hadii aad ku hadasho Soomaali, waaxda luqadaha, qaybta kaalmada adeegyada, waxay idiin hayaan adeeg kharash la'aan . So [facility] [phone].    ATENCIÓN: Si habla esppetr, tiene a king disposición servicios gratuitos de asistencia lingüística. Llame al [phone].    We comply with applicable federal civil rights laws and [place] laws. We do not discriminate on the basis of race, color, national origin, age, disability sex, sexual orientation or gender identity.            Thank you!     Thank you for choosing [facility]  for your care. Our goal is always to provide you with excellent care. Hearing back from our patients is one way we can continue to improve our services. Please take a few minutes to complete the written survey that you may receive in the mail after your visit with us. Thank you!             Your Updated Medication List - Protect others around you: Learn how to safely use, store and throw away your medicines at www.disposemymeds.org.          This list is accurate as of: 9/27/17  3:59 PM.  Always use your most recent med list.                   Brand Name Dispense Instructions for use Diagnosis    aspirin 81 MG tablet     90 tablet    Take 1 tablet (81 mg) by mouth daily        blood glucose monitoring test strip    no brand specified    1 Box    Test twice daily    Type 2 diabetes, HbA1c goal < 7% (H)       glimepiride 4 MG tablet    AMARYL    90 tablet    TAKE 1 TABLET BY MOUTH EVERY MORNING BEFORE BREAKFAST.    Type 2 diabetes mellitus without complication, without long-term current use of insulin (H)       lisinopril 10 MG  tablet    PRINIVIL/ZESTRIL    45 tablet    TAKE ONE-HALF TABLET BY MOUTH DAILY    Essential hypertension with goal blood pressure less than 140/90       metFORMIN 1000 MG tablet    GLUCOPHAGE    180 tablet    Take 1 tablet (1,000 mg) by mouth 2 times daily (with meals)    Type 2 diabetes mellitus without complication, without long-term current use of insulin (H)       MULTIVITAMIN PO      Take 1 tablet by mouth daily.        OMEPRAZOLE PO           simvastatin 40 MG tablet    ZOCOR    135 tablet    TAKE ONE AND ONE-HALF TABLETS BY MOUTH DAILY    Hyperlipidemia LDL goal <100       triamcinolone 0.1 % cream    KENALOG    30 g    Apply sparingly to affected area three times daily for 14 days.    Eczema, unspecified type       Urea 40 % Crea     198.6 g    applt ohands and feet daily    Palmoplantar keratoderma

## 2017-11-17 DIAGNOSIS — E11.9 TYPE 2 DIABETES, HBA1C GOAL < 7% (H): ICD-10-CM

## 2017-11-17 RX ORDER — BIOTIN 1 MG
TABLET ORAL
Qty: 100 STRIP | Refills: 3 | Status: ON HOLD | OUTPATIENT
Start: 2017-11-17 | End: 2022-02-26

## 2017-11-21 DIAGNOSIS — E11.9 TYPE 2 DIABETES MELLITUS WITHOUT COMPLICATION, WITHOUT LONG-TERM CURRENT USE OF INSULIN (H): Primary | ICD-10-CM

## 2017-11-22 RX ORDER — LANCETS
EACH MISCELLANEOUS
Qty: 200 EACH | Refills: 6 | Status: SHIPPED | OUTPATIENT
Start: 2017-11-22 | End: 2019-05-28

## 2017-11-22 RX ORDER — GLUCOSAMINE HCL/CHONDROITIN SU 500-400 MG
CAPSULE ORAL
Qty: 100 EACH | Refills: 3 | Status: ON HOLD | OUTPATIENT
Start: 2017-11-22 | End: 2022-02-26

## 2017-11-22 NOTE — TELEPHONE ENCOUNTER
TrueTest glucose test strips discontinued. Pharmacy requesting new strips, meter and lancets. Generic pended for review    LOV: 9/27/17 RT Gómez(R)

## 2017-12-19 DIAGNOSIS — R79.89 ELEVATED TSH: ICD-10-CM

## 2017-12-19 DIAGNOSIS — E11.9 TYPE 2 DIABETES MELLITUS WITHOUT COMPLICATION, WITHOUT LONG-TERM CURRENT USE OF INSULIN (H): ICD-10-CM

## 2017-12-19 LAB — HBA1C MFR BLD: 7.4 % (ref 4.3–6)

## 2017-12-19 PROCEDURE — 82043 UR ALBUMIN QUANTITATIVE: CPT | Performed by: PHYSICIAN ASSISTANT

## 2017-12-19 PROCEDURE — 84443 ASSAY THYROID STIM HORMONE: CPT | Performed by: PHYSICIAN ASSISTANT

## 2017-12-19 PROCEDURE — 83036 HEMOGLOBIN GLYCOSYLATED A1C: CPT | Performed by: PHYSICIAN ASSISTANT

## 2017-12-19 PROCEDURE — 36415 COLL VENOUS BLD VENIPUNCTURE: CPT | Performed by: PHYSICIAN ASSISTANT

## 2017-12-20 LAB
CREAT UR-MCNC: 81 MG/DL
MICROALBUMIN UR-MCNC: 7 MG/L
MICROALBUMIN/CREAT UR: 8.62 MG/G CR (ref 0–25)
TSH SERPL DL<=0.005 MIU/L-ACNC: 3.47 MU/L (ref 0.4–4)

## 2017-12-21 NOTE — PROGRESS NOTES
Rubi,    Your hemoglobin A1c is still not at goal of <7%.  You are 7.4%  I'd like you to come in to talk about the different medications we could add to help improve your control.   Please make an appointment.    Your TSH (thyroid test) is normal.  Your urine microalbumin test was normal.    Luz Maria Demarco PA-C

## 2018-01-15 ENCOUNTER — OFFICE VISIT (OUTPATIENT)
Dept: FAMILY MEDICINE | Facility: CLINIC | Age: 58
End: 2018-01-15
Payer: COMMERCIAL

## 2018-01-15 VITALS
WEIGHT: 151 LBS | TEMPERATURE: 97.6 F | BODY MASS INDEX: 26.75 KG/M2 | DIASTOLIC BLOOD PRESSURE: 72 MMHG | OXYGEN SATURATION: 97 % | HEIGHT: 63 IN | HEART RATE: 79 BPM | SYSTOLIC BLOOD PRESSURE: 123 MMHG

## 2018-01-15 DIAGNOSIS — E78.5 HYPERLIPIDEMIA LDL GOAL <100: ICD-10-CM

## 2018-01-15 DIAGNOSIS — E11.9 TYPE 2 DIABETES MELLITUS WITHOUT COMPLICATION, WITHOUT LONG-TERM CURRENT USE OF INSULIN (H): Primary | ICD-10-CM

## 2018-01-15 DIAGNOSIS — Z13.89 SCREENING FOR DIABETIC PERIPHERAL NEUROPATHY: ICD-10-CM

## 2018-01-15 PROCEDURE — 99213 OFFICE O/P EST LOW 20 MIN: CPT | Performed by: PHYSICIAN ASSISTANT

## 2018-01-15 PROCEDURE — 99207 C FOOT EXAM  NO CHARGE: CPT | Performed by: PHYSICIAN ASSISTANT

## 2018-01-15 RX ORDER — GLIMEPIRIDE 4 MG/1
8 TABLET ORAL
Qty: 180 TABLET | Refills: 3 | Status: SHIPPED | OUTPATIENT
Start: 2018-01-15 | End: 2018-11-14

## 2018-01-15 NOTE — PROGRESS NOTES
HPI: This is a 56 yo female here for f/u type 2 DM.  Lab Results   Component Value Date    A1C 7.4 12/19/2017    A1C 7.3 09/18/2017    A1C 7.1 05/30/2017    A1C 7.2 01/31/2017    A1C 6.8 09/27/2016     Pt was on Wt Watchers in the past and has been able to lose weight (25lbs) but did gain back 10lbs  She has a bad knee and with her DM would like to work hard on losing weight  She is doing walking for exercise and tracking her miles  Currently taking metformin 1000mg BID and amaryl 4mg daily for her diabetes  Denies any hypoglycemia.    Meals:   B: oatmeal, yogurt, egg rarely  L: bread, lunch meat, amaral, veggies, fruit  D: rice, chicken, soups  Doesn't drink sugar drinks, has coffee with creamer  Occas chex mix or similar for snacks    Past Medical History:   Diagnosis Date     Atypical squamous cells of undetermined significance (ASCUS) on Papanicolaou smear of cervix 10/9/2015     GERD (gastroesophageal reflux disease)      HTN (hypertension)      Hyperlipidemia      Type 2 diabetes mellitus without complications (H) 10/22/2015     Past Surgical History:   Procedure Laterality Date     APPENDECTOMY  1976    at time of USO     ARTHROSCOPY KNEE RT/LT       C LAP OVARIAN CYSTOTOMY  1976    salpingooopherectomy for large dermoid     COLONOSCOPY N/A 11/9/2015    Procedure: COLONOSCOPY;  Surgeon: Kate Redmond MD;  Location:  GI     Social History   Substance Use Topics     Smoking status: Never Smoker     Smokeless tobacco: Never Used     Alcohol use No     Current Outpatient Prescriptions   Medication Sig Dispense Refill     glimepiride (AMARYL) 4 MG tablet Take 2 tablets (8 mg) by mouth every morning (before breakfast) 180 tablet 3     blood glucose monitoring (NO BRAND SPECIFIED) meter device kit Use to test blood sugar 2 times daily or as directed. Preferred blood glucose meter OR supplies to accompany: Blood Glucose Monitor Brands: per insurance. 1 kit 0     blood glucose monitoring (NO BRAND SPECIFIED)  "test strip Use to test blood sugar 2 times daily or as directed. To accompany: Blood Glucose Monitor Brands: per insurance. 100 strip 3     blood glucose calibration (NO BRAND SPECIFIED) solution To accompany: Blood Glucose Monitor Brands: per insurance. 1 Bottle 3     thin (NO BRAND SPECIFIED) lancets Use with lanceting device. To accompany: Blood Glucose Monitor Brands: per insurance. 200 each 6     alcohol swab prep pads Use to swab area of injection/tim as directed. 100 each 3     TRUETEST test strip TEST TWICE DAILY 100 strip 3     metFORMIN (GLUCOPHAGE) 1000 MG tablet Take 1 tablet (1,000 mg) by mouth 2 times daily (with meals) 180 tablet 1     triamcinolone (KENALOG) 0.1 % cream Apply sparingly to affected area three times daily for 14 days. (Patient taking differently: as needed Apply sparingly to affected area three times daily for 14 days.) 30 g 3     simvastatin (ZOCOR) 40 MG tablet TAKE ONE AND ONE-HALF TABLETS BY MOUTH DAILY 135 tablet 2     lisinopril (PRINIVIL/ZESTRIL) 10 MG tablet TAKE ONE-HALF TABLET BY MOUTH DAILY 45 tablet 2     Urea 40 % CREA applt ohands and feet daily 198.6 g 11     OMEPRAZOLE PO        aspirin 81 MG tablet Take 1 tablet (81 mg) by mouth daily 90 tablet 3     Multiple Vitamin (MULTIVITAMIN OR) Take 1 tablet by mouth daily.       [DISCONTINUED] glimepiride (AMARYL) 4 MG tablet TAKE 1 TABLET BY MOUTH EVERY MORNING BEFORE BREAKFAST. 90 tablet 1     Allergies   Allergen Reactions     Keflex [Cephalexin Hcl] Hives     Penicillins Hives     Ragweeds      Sneezing etc.     FAMILY HISTORY NOTED AND REVIEWED    PHYSICAL EXAM:    /72 (Cuff Size: Adult Regular)  Pulse 79  Temp 97.6  F (36.4  C) (Oral)  Ht 5' 2.5\" (1.588 m)  Wt 151 lb (68.5 kg)  SpO2 97%  Breastfeeding? No  BMI 27.18 kg/m2    Patient appears non toxic    Assessment and Plan:     (E11.9) Type 2 diabetes mellitus without complication, without long-term current use of insulin (H)  (primary encounter " "diagnosis)  Comment: discussed options of increasing amaryl vs adding a 3rd agent such as januvia and pt opted for increasing amaryl to 8mg qd.  She will MyChart msg me in a month with blood sugar readings. She will get back on Wt Watchers and be more diligent with diet and exercise. Return for \"fasting lab only\" 12 weeks for recheck of A1c and lipids.  Plan: glimepiride (AMARYL) 4 MG tablet, Hemoglobin         A1c            (Z13.89) Screening for diabetic peripheral neuropathy  Comment:   Plan: FOOT EXAM  NO CHARGE [14362.114]            (E78.5) Hyperlipidemia LDL goal <100  Comment: she does take zocor 60mg qd.  Plan: Lipid Profile in 3 months since not fasting today.    Spent 20 minutes FTF with patient of which all time was spent discussing the coordination of care and management of their diabetes.                Luz Maria Demarco PA-C          "

## 2018-01-15 NOTE — MR AVS SNAPSHOT
After Visit Summary   1/15/2018    Rubi Nicolas    MRN: [id number]           Patient Information     Date Of Birth          1960        Visit Information        Provider Department      1/15/2018 9:00 AM Luz Maria Demarco PA-C [facility]        Today's Diagnoses     Type 2 diabetes mellitus without complication, without long-term current use of insulin (H)    -  1    Screening for diabetic peripheral neuropathy        Hyperlipidemia LDL goal <100           Follow-ups after your visit        Future tests that were ordered for you today     Open Future Orders        Priority Expected Expires Ordered    Lipid Profile Routine 4/15/2018 1/15/2019 1/15/2018    Hemoglobin A1c Routine 4/15/2018 1/15/2019 1/15/2018            Who to contact     If you have questions or need follow up information about today's clinic visit or your schedule please contact [facility] directly at [phone].  Normal or non-critical lab and imaging results will be communicated to you by MyChart, letter or phone within 4 business days after the clinic has received the results. If you do not hear from us within 7 days, please contact the clinic through [employer] or phone. If you have a critical or abnormal lab result, we will notify you by phone as soon as possible.  Submit refill requests through [employer] or call your pharmacy and they will forward the refill request to us. Please allow 3 business days for your refill to be completed.          Additional Information About Your Visit        MyChart Information     [employer] gives you secure access to your electronic health record. If you see a primary care provider, you can also send messages to your care team and make appointments. If you have questions, please call your primary care clinic.  If you do not have a primary care provider, please call [phone] and they will assist you.        Care EveryWhere ID     This is your Care EveryWhere ID. This  "could be used by other organizations to access your [facility] medical records  [id number]        Your Vitals Were     Pulse Temperature Height Pulse Oximetry Breastfeeding? BMI (Body Mass Index)    79 97.6  F (36.4  C) (Oral) 5' 2.5\" (1.588 m) 97% No 27.18 kg/m2       Blood Pressure from Last 3 Encounters:   01/15/18 123/72   09/27/17 125/74   07/25/17 126/75    Weight from Last 3 Encounters:   01/15/18 151 lb (68.5 kg)   09/27/17 150 lb (68 kg)   07/25/17 149 lb 11.2 oz (67.9 kg)              We Performed the Following     FOOT EXAM  NO CHARGE [38960.114]          Today's Medication Changes          These changes are accurate as of: 1/15/18  9:34 AM.  If you have any questions, ask your nurse or doctor.               These medicines have changed or have updated prescriptions.        Dose/Directions    * glimepiride 4 MG tablet   Commonly known as:  AMARYL   This may have changed:  Another medication with the same name was added. Make sure you understand how and when to take each.   Used for:  Type 2 diabetes mellitus without complication, without long-term current use of insulin (H)   Changed by:  Luz Maria Demarco PA-C        TAKE 1 TABLET BY MOUTH EVERY MORNING BEFORE BREAKFAST.   Quantity:  90 tablet   Refills:  1       * glimepiride 4 MG tablet   Commonly known as:  AMARYL   This may have changed:  You were already taking a medication with the same name, and this prescription was added. Make sure you understand how and when to take each.   Used for:  Type 2 diabetes mellitus without complication, without long-term current use of insulin (H)   Changed by:  Luz Maria Demarco PA-C        Dose:  8 mg   Take 2 tablets (8 mg) by mouth every morning (before breakfast)   Quantity:  180 tablet   Refills:  3       triamcinolone 0.1 % cream   Commonly known as:  KENALOG   This may have changed:    - when to take this  - reasons to take this  - additional instructions   Used for:  Eczema, unspecified type        Apply " sparingly to affected area three times daily for 14 days.   Quantity:  30 g   Refills:  3       * Notice:  This list has 2 medication(s) that are the same as other medications prescribed for you. Read the directions carefully, and ask your doctor or other care provider to review them with you.         Where to get your medicines      These medications were sent to [id number] - [place], [place] - [address]  [address], [place] [address]     Phone:  [phone]     glimepiride 4 MG tablet                Primary Care Provider Office Phone # Fax #    Luz Maria Demarco PA-C [phone] [address]  [place] [address]        Equal Access to Services     ABRIL ODONNELL : Hadii laura obrien hadasho Sojoseali, waaxda luqadaha, qaybta kaalmada adeegyada, waxluis miller . So [facility] [phone].    ATENCIÓN: Si habla español, tiene a king disposición servicios gratuitos de asistencia lingüística. Ll[facility] [phone].    We comply with applicable federal civil rights laws and [place] laws. We do not discriminate on the basis of race, color, national origin, age, disability, sex, sexual orientation, or gender identity.            Thank you!     Thank you for choosing [facility]  for your care. Our goal is always to provide you with excellent care. Hearing back from our patients is one way we can continue to improve our services. Please take a few minutes to complete the written survey that you may receive in the mail after your visit with us. Thank you!             Your Updated Medication List - Protect others around you: Learn how to safely use, store and throw away your medicines at www.disposemymeds.org.          This list is accurate as of: 1/15/18  9:34 AM.  Always use your most recent med list.                   Brand Name Dispense Instructions for use Diagnosis    alcohol swab prep pads     100 each    Use to swab area of injection/tim as directed.     Type 2 diabetes mellitus without complication, without long-term current use of insulin (H)       aspirin 81 MG tablet     90 tablet    Take 1 tablet (81 mg) by mouth daily        blood glucose calibration solution    NO BRAND SPECIFIED    1 Bottle    To accompany: Blood Glucose Monitor Brands: per insurance.    Type 2 diabetes mellitus without complication, without long-term current use of insulin (H)       blood glucose monitoring meter device kit    no brand specified    1 kit    Use to test blood sugar 2 times daily or as directed. Preferred blood glucose meter OR supplies to accompany: Blood Glucose Monitor Brands: per insurance.    Type 2 diabetes mellitus without complication, without long-term current use of insulin (H)       * glimepiride 4 MG tablet    AMARYL    90 tablet    TAKE 1 TABLET BY MOUTH EVERY MORNING BEFORE BREAKFAST.    Type 2 diabetes mellitus without complication, without long-term current use of insulin (H)       * glimepiride 4 MG tablet    AMARYL    180 tablet    Take 2 tablets (8 mg) by mouth every morning (before breakfast)    Type 2 diabetes mellitus without complication, without long-term current use of insulin (H)       lisinopril 10 MG tablet    PRINIVIL/ZESTRIL    45 tablet    TAKE ONE-HALF TABLET BY MOUTH DAILY    Essential hypertension with goal blood pressure less than 140/90       metFORMIN 1000 MG tablet    GLUCOPHAGE    180 tablet    Take 1 tablet (1,000 mg) by mouth 2 times daily (with meals)    Type 2 diabetes mellitus without complication, without long-term current use of insulin (H)       MULTIVITAMIN PO      Take 1 tablet by mouth daily.        OMEPRAZOLE PO           simvastatin 40 MG tablet    ZOCOR    135 tablet    TAKE ONE AND ONE-HALF TABLETS BY MOUTH DAILY    Hyperlipidemia LDL goal <100       thin lancets    NO BRAND SPECIFIED    200 each    Use with lanceting device. To accompany: Blood Glucose Monitor Brands: per insurance.    Type 2 diabetes mellitus without  complication, without long-term current use of insulin (H)       triamcinolone 0.1 % cream    KENALOG    30 g    Apply sparingly to affected area three times daily for 14 days.    Eczema, unspecified type       * TRUETEST test strip   Generic drug:  blood glucose monitoring     100 strip    TEST TWICE DAILY    Type 2 diabetes, HbA1c goal < 7% (H)       * blood glucose monitoring test strip    no brand specified    100 strip    Use to test blood sugar 2 times daily or as directed. To accompany: Blood Glucose Monitor Brands: per insurance.    Type 2 diabetes mellitus without complication, without long-term current use of insulin (H)       Urea 40 % Crea     198.6 g    applt ohands and feet daily    Palmoplantar keratoderma       * Notice:  This list has 4 medication(s) that are the same as other medications prescribed for you. Read the directions carefully, and ask your doctor or other care provider to review them with you.

## 2018-02-07 DIAGNOSIS — I10 ESSENTIAL HYPERTENSION WITH GOAL BLOOD PRESSURE LESS THAN 140/90: ICD-10-CM

## 2018-02-07 DIAGNOSIS — E78.5 HYPERLIPIDEMIA LDL GOAL <100: ICD-10-CM

## 2018-02-07 RX ORDER — LISINOPRIL 10 MG/1
TABLET ORAL
Qty: 45 TABLET | Refills: 3 | Status: SHIPPED | OUTPATIENT
Start: 2018-02-07 | End: 2018-11-09

## 2018-02-07 RX ORDER — SIMVASTATIN 40 MG
TABLET ORAL
Qty: 135 TABLET | Refills: 0 | Status: SHIPPED | OUTPATIENT
Start: 2018-02-07 | End: 2018-05-12

## 2018-02-07 NOTE — TELEPHONE ENCOUNTER
"Requested Prescriptions   Pending Prescriptions Disp Refills     simvastatin (ZOCOR) 40 MG tablet [Pharmacy Med Name: SIMVASTATIN 40 MG TABLET] 135 tablet 2    Last Written Prescription Date:  5/19/2017  Last Fill Quantity: 135,  # refills: 2   Last Office Visit with [facility] provider:  1/15/2018   Future Office Visit:      Sig: TAKE ONE AND ONE-HALF TABLETS BY MOUTH DAILY    Statins Protocol Failed    2/7/2018  1:13 AM       Failed - LDL on file in past 12 months    Recent Labs   Lab Test  01/31/17   0812   LDL  75            Passed - No abnormal creatine kinase in past 12 months    No lab results found.         Passed - Recent or future visit with authorizing provider    Patient had office visit in the last year or has a visit in the next 30 days with authorizing provider.  See \"Patient Info\" tab in inbasket, or \"Choose Columns\" in Meds & Orders section of the refill encounter.            Passed - Patient is age 18 or older       Passed - No active pregnancy on record       Passed - No positive pregnancy test in past 12 months        lisinopril (PRINIVIL/ZESTRIL) 10 MG tablet [Pharmacy Med Name: LISINOPRIL 10 MG TABLET] 45 tablet 2    Last Written Prescription Date:  5/19/217  Last Fill Quantity: 45,  # refills: 2   Last Office Visit with [facility] provider:  1/15/2018   Future Office Visit:      Sig: TAKE ONE-HALF TABLET BY MOUTH DAILY    ACE Inhibitors (Including Combos) Protocol Passed    2/7/2018  1:13 AM       Passed - Blood pressure under 140/90    BP Readings from Last 3 Encounters:   01/15/18 123/72   09/27/17 125/74   07/25/17 126/75                Passed - Recent or future visit with authorizing provider's specialty    Patient had office visit in the last year or has a visit in the next 30 days with authorizing provider.  See \"Patient Info\" tab in inbasket, or \"Choose Columns\" in Meds & Orders section of the refill encounter.            Passed - Patient is age 18 or older       Passed - No active pregnancy on " record       Passed - Normal serum creatinine on file in past 12 months    Recent Labs   Lab Test  09/18/17   0800   CR  0.64            Passed - Normal serum potassium on file in past 12 months    Recent Labs   Lab Test  09/18/17   0800   POTASSIUM  4.4            Passed - No positive pregnancy test in past 12 months

## 2018-02-07 NOTE — TELEPHONE ENCOUNTER
Prescription approved per [facility] Refill Protocol.  Kriss Cordero RN

## 2018-02-19 DIAGNOSIS — E11.9 TYPE 2 DIABETES MELLITUS WITHOUT COMPLICATION, WITHOUT LONG-TERM CURRENT USE OF INSULIN (H): ICD-10-CM

## 2018-02-19 NOTE — TELEPHONE ENCOUNTER
"glimepiride (AMARYL) 4 MG tablet 180 tablet 3 1/15/2018         Last Written Prescription Date:  01/15/2018  Last Fill Quantity: 180,  # refills: 3   Last office visit: 1/15/2018 with prescribing provider:  Luz Maria Demarco   Future Office Visit:  Unknown    Requested Prescriptions   Pending Prescriptions Disp Refills     glimepiride (AMARYL) 4 MG tablet [Pharmacy Med Name: GLIMEPIRIDE 4 MG TABLET] 90 tablet 1     Sig: TAKE 1 TABLET BY MOUTH EVERY MORNING BEFORE BREAKFAST.    Sulfonylurea Agents Failed    2/19/2018 10:33 AM       Failed - Patient has documented LDL within the past 12 mos.    Recent Labs   Lab Test  01/31/17   0812   LDL  75            Passed - Blood pressure less than 140/90 in past 6 months    BP Readings from Last 3 Encounters:   01/15/18 123/72   09/27/17 125/74   07/25/17 126/75                Passed - Patient has had a Microalbumin in the past 12 mos.    Recent Labs   Lab Test  12/19/17   1439  10/12/11   MICROALB   --    --   <0.06   MICROL  7   < >   --    UMALCR  8.62   < >   --     < > = values in this interval not displayed.            Passed - Patient has documented A1c within the specified period of time.    Recent Labs   Lab Test  12/19/17   1435   A1C  7.4*            Passed - Patient is age 18 or older       Passed - No active pregnancy on record       Passed - Patient has a recent creatinine (normal) within the past 12 mos.    Recent Labs   Lab Test  09/18/17   0800   CR  0.64            Passed - Patient has not had a positive pregnancy test within the past 12 mos.       Passed - Patient has had an appointment with authorizing provider within the past 6 mos. or  within next 30 days    Patient had office visit in the last 6 months or has a visit in the next 30 days with authorizing provider.  See \"Patient Info\" tab in inbasket, or \"Choose Columns\" in Meds & Orders section of the refill encounter.              "

## 2018-02-20 RX ORDER — GLIMEPIRIDE 4 MG/1
TABLET ORAL
Start: 2018-02-20

## 2018-03-25 DIAGNOSIS — E11.9 TYPE 2 DIABETES MELLITUS WITHOUT COMPLICATION, WITHOUT LONG-TERM CURRENT USE OF INSULIN (H): ICD-10-CM

## 2018-03-26 NOTE — TELEPHONE ENCOUNTER
"metFORMIN (GLUCOPHAGE) 1000 MG tablet 180 tablet 1 9/27/2017     Last Written Prescription Date:  9/27/17  Last Fill Quantity: 180,  # refills: 1   Last office visit: 1/15/2018 with prescribing provider:  arlene   Future Office Visit:  none  Requested Prescriptions   Pending Prescriptions Disp Refills     metFORMIN (GLUCOPHAGE) 1000 MG tablet [Pharmacy Med Name: METFORMIN HCL 1,000 MG TABLET] 180 tablet 1     Sig: TAKE 1 TABLET BY MOUTH TWICE A DAY WITH MEALS    Biguanide Agents Failed    3/25/2018  1:38 AM       Failed - Patient has documented LDL within the past 12 mos.    Recent Labs   Lab Test  01/31/17   0812   LDL  75            Passed - Blood pressure less than 140/90 in past 6 months    BP Readings from Last 3 Encounters:   01/15/18 123/72   09/27/17 125/74   07/25/17 126/75                Passed - Patient has had a Microalbumin in the past 12 mos.    Recent Labs   Lab Test  12/19/17   1439  10/12/11   MICROALB   --    --   <0.06   MICROL  7   < >   --    UMALCR  8.62   < >   --     < > = values in this interval not displayed.            Passed - Patient is age 10 or older       Passed - Patient has documented A1c within the specified period of time.    Recent Labs   Lab Test  12/19/17   1435   A1C  7.4*            Passed - Patient's CR is NOT>1.4 OR Patient's EGFR is NOT<45 within past 12 mos.    Recent Labs   Lab Test  09/18/17   0800   GFRESTIMATED  >90   GFRESTBLACK  >90       Recent Labs   Lab Test  09/18/17   0800   CR  0.64            Passed - Patient does NOT have a diagnosis of CHF.       Passed - Patient is not pregnant       Passed - Patient has not had a positive pregnancy test within the past 12 mos.        Passed - Recent (6 mo) or future (30 days) visit within the authorizing provider's specialty    Patient had office visit in the last 6 months or has a visit in the next 30 days with authorizing provider or within the authorizing provider's specialty.  See \"Patient Info\" tab in inbasket, or " "\"Choose Columns\" in Meds & Orders section of the refill encounter.            PHQ-9 SCORE 9/28/2015   Total Score 0     "

## 2018-03-26 NOTE — TELEPHONE ENCOUNTER
Medication is being filled for 1 time refill only due to:  Future labs ordered needs lipids- ordered 1/2018..   Cherise Vega RN

## 2018-04-13 ENCOUNTER — HOSPITAL ENCOUNTER (OUTPATIENT)
Dept: MAMMOGRAPHY | Facility: CLINIC | Age: 58
Discharge: HOME | End: 2018-04-13
Attending: PHYSICIAN ASSISTANT | Admitting: PHYSICIAN ASSISTANT
Payer: COMMERCIAL

## 2018-04-13 DIAGNOSIS — Z12.31 VISIT FOR SCREENING MAMMOGRAM: ICD-10-CM

## 2018-04-13 PROCEDURE — 77067 SCR MAMMO BI INCL CAD: CPT

## 2018-04-16 DIAGNOSIS — E78.5 HYPERLIPIDEMIA LDL GOAL <100: ICD-10-CM

## 2018-04-16 DIAGNOSIS — R30.0 DYSURIA: Primary | ICD-10-CM

## 2018-04-16 DIAGNOSIS — R30.0 DYSURIA: ICD-10-CM

## 2018-04-16 DIAGNOSIS — E11.9 TYPE 2 DIABETES MELLITUS WITHOUT COMPLICATION, WITHOUT LONG-TERM CURRENT USE OF INSULIN (H): ICD-10-CM

## 2018-04-16 LAB
ALBUMIN UR-MCNC: NEGATIVE MG/DL
APPEARANCE UR: CLEAR
BILIRUB UR QL STRIP: NEGATIVE
CHOLEST SERPL-MCNC: 164 MG/DL
COLOR UR AUTO: YELLOW
GLUCOSE UR STRIP-MCNC: NEGATIVE MG/DL
HBA1C MFR BLD: 7.2 % (ref 0–5.6)
HDLC SERPL-MCNC: 57 MG/DL
HGB UR QL STRIP: NEGATIVE
KETONES UR STRIP-MCNC: NEGATIVE MG/DL
LDLC SERPL CALC-MCNC: 92 MG/DL
LEUKOCYTE ESTERASE UR QL STRIP: NEGATIVE
NITRATE UR QL: NEGATIVE
NONHDLC SERPL-MCNC: 107 MG/DL
PH UR STRIP: 5.5 PH (ref 5–7)
SOURCE: NORMAL
SP GR UR STRIP: 1.02 (ref 1–1.03)
TRIGL SERPL-MCNC: 75 MG/DL
UROBILINOGEN UR STRIP-ACNC: 0.2 EU/DL (ref 0.2–1)

## 2018-04-16 PROCEDURE — 36415 COLL VENOUS BLD VENIPUNCTURE: CPT | Performed by: PHYSICIAN ASSISTANT

## 2018-04-16 PROCEDURE — 80061 LIPID PANEL: CPT | Performed by: PHYSICIAN ASSISTANT

## 2018-04-16 PROCEDURE — 81003 URINALYSIS AUTO W/O SCOPE: CPT | Performed by: FAMILY MEDICINE

## 2018-04-16 PROCEDURE — 83036 HEMOGLOBIN GLYCOSYLATED A1C: CPT | Performed by: PHYSICIAN ASSISTANT

## 2018-04-16 NOTE — PROGRESS NOTES
Here are your lab results from your recent lab visit...  -Normal urine study.    Please let me know if you have any questions.  Kurt Herrera MD

## 2018-04-17 NOTE — PROGRESS NOTES
Rubi,     Your cholesterol profile looks good.    Your hemoglobin A1c is improved to 7.2%.    Keep working on a healthy diet.    Follow up in 6 months.    Luz Maria Demarco PA-C

## 2018-04-28 DIAGNOSIS — E11.9 TYPE 2 DIABETES MELLITUS WITHOUT COMPLICATION, WITHOUT LONG-TERM CURRENT USE OF INSULIN (H): ICD-10-CM

## 2018-04-30 NOTE — TELEPHONE ENCOUNTER
Prescription approved per [facility] Refill Protocol.    Marycruz DUMONT RN

## 2018-09-19 ENCOUNTER — TELEPHONE (OUTPATIENT)
Dept: FAMILY MEDICINE | Facility: CLINIC | Age: 58
End: 2018-09-19

## 2018-11-01 DIAGNOSIS — E11.9 TYPE 2 DIABETES MELLITUS WITHOUT COMPLICATION, WITHOUT LONG-TERM CURRENT USE OF INSULIN (H): ICD-10-CM

## 2018-11-01 NOTE — TELEPHONE ENCOUNTER
"Last Written Prescription Date:  4/30/18  Last Fill Quantity: 180 tablet,  # refills: 1   Last office visit: 1/15/2018 with prescribing provider:  Nilam   Future Office Visit:      Requested Prescriptions   Pending Prescriptions Disp Refills     metFORMIN (GLUCOPHAGE) 1000 MG tablet [Pharmacy Med Name: METFORMIN HCL 1,000 MG TABLET] 180 tablet 1     Sig: TAKE 1 TABLET BY MOUTH TWICE A DAY WITH MEALS    Biguanide Agents Failed    11/1/2018  2:03 AM       Failed - Blood pressure less than 140/90 in past 6 months    BP Readings from Last 3 Encounters:   01/15/18 123/72   09/27/17 125/74   07/25/17 126/75                Failed - Patient has documented A1c within the specified period of time.    If HgbA1C is 8 or greater, it needs to be on file within the past 3 months.  If less than 8, must be on file within the past 6 months.     Recent Labs   Lab Test  04/16/18   0856   A1C  7.2*            Failed - Recent (6 mo) or future (30 days) visit within the authorizing provider's specialty    Patient had office visit in the last 6 months or has a visit in the next 30 days with authorizing provider or within the authorizing provider's specialty.  See \"Patient Info\" tab in inbasket, or \"Choose Columns\" in Meds & Orders section of the refill encounter.           Passed - Patient has documented LDL within the past 12 mos.    Recent Labs   Lab Test  04/16/18   0856   LDL  92            Passed - Patient has had a Microalbumin in the past 15 mos.    Recent Labs   Lab Test  12/19/17   1439  10/12/11   MICROALB   --    --   <0.06   MICROL  7   < >   --    UMALCR  8.62   < >   --     < > = values in this interval not displayed.            Passed - Patient is age 10 or older       Passed - Patient's CR is NOT>1.4 OR Patient's EGFR is NOT<45 within past 12 mos.    Recent Labs   Lab Test  09/18/17   0800   GFRESTIMATED  >90   GFRESTBLACK  >90       Recent Labs   Lab Test  09/18/17   0800   CR  0.64            Passed - Patient does NOT have " a diagnosis of CHF.       Passed - Patient is not pregnant       Passed - Patient has not had a positive pregnancy test within the past 12 mos.

## 2018-11-02 NOTE — TELEPHONE ENCOUNTER
Medication is being filled for 1 time refill only due to:  pt over due for apt- was due last month dfor 6 month f/u   Cherise Vega RN

## 2018-11-06 ENCOUNTER — DOCUMENTATION ONLY (OUTPATIENT)
Dept: LAB | Facility: CLINIC | Age: 58
End: 2018-11-06

## 2018-11-06 DIAGNOSIS — I10 ESSENTIAL HYPERTENSION: Primary | ICD-10-CM

## 2018-11-06 DIAGNOSIS — E11.9 TYPE 2 DIABETES MELLITUS WITHOUT COMPLICATION, WITHOUT LONG-TERM CURRENT USE OF INSULIN (H): ICD-10-CM

## 2018-11-06 NOTE — PROGRESS NOTES
Patient has lab appointment on 11/9/18, but no orders are in.  Please place orders, if needed.  Thank you Lab,

## 2018-11-09 DIAGNOSIS — E11.9 TYPE 2 DIABETES MELLITUS WITHOUT COMPLICATION, WITHOUT LONG-TERM CURRENT USE OF INSULIN (H): ICD-10-CM

## 2018-11-09 DIAGNOSIS — I10 ESSENTIAL HYPERTENSION WITH GOAL BLOOD PRESSURE LESS THAN 140/90: ICD-10-CM

## 2018-11-09 DIAGNOSIS — I10 ESSENTIAL HYPERTENSION: ICD-10-CM

## 2018-11-09 LAB
ANION GAP SERPL CALCULATED.3IONS-SCNC: 9 MMOL/L (ref 3–14)
BUN SERPL-MCNC: 16 MG/DL (ref 7–30)
CALCIUM SERPL-MCNC: 9.7 MG/DL (ref 8.5–10.1)
CHLORIDE SERPL-SCNC: 103 MMOL/L (ref 94–109)
CO2 SERPL-SCNC: 26 MMOL/L (ref 20–32)
CREAT SERPL-MCNC: 0.71 MG/DL (ref 0.52–1.04)
CREAT UR-MCNC: 73 MG/DL
GFR SERPL CREATININE-BSD FRML MDRD: 83 ML/MIN/1.7M2
GLUCOSE SERPL-MCNC: 158 MG/DL (ref 70–99)
HBA1C MFR BLD: 6.9 % (ref 0–5.6)
MICROALBUMIN UR-MCNC: 5 MG/L
MICROALBUMIN/CREAT UR: 7.16 MG/G CR (ref 0–25)
POTASSIUM SERPL-SCNC: 4.4 MMOL/L (ref 3.4–5.3)
SODIUM SERPL-SCNC: 138 MMOL/L (ref 133–144)

## 2018-11-09 PROCEDURE — 80048 BASIC METABOLIC PNL TOTAL CA: CPT | Performed by: PHYSICIAN ASSISTANT

## 2018-11-09 PROCEDURE — 36415 COLL VENOUS BLD VENIPUNCTURE: CPT | Performed by: PHYSICIAN ASSISTANT

## 2018-11-09 PROCEDURE — 83036 HEMOGLOBIN GLYCOSYLATED A1C: CPT | Performed by: PHYSICIAN ASSISTANT

## 2018-11-09 PROCEDURE — 82043 UR ALBUMIN QUANTITATIVE: CPT | Performed by: PHYSICIAN ASSISTANT

## 2018-11-09 RX ORDER — LISINOPRIL 10 MG/1
TABLET ORAL
Qty: 45 TABLET | Refills: 0 | Status: SHIPPED | OUTPATIENT
Start: 2018-11-09 | End: 2018-11-14

## 2018-11-09 NOTE — TELEPHONE ENCOUNTER
"lisinopril (PRINIVIL/ZESTRIL) 10 MG tablet 45 tablet 3 2/7/2018     Last Written Prescription Date:  2/7/18  Last Fill Quantity: 45,  # refills: 3   Last office visit: 1/15/2018 with prescribing provider:  Nilam   Future Office Visit:   Next 5 appointments (look out 90 days)     Nov 14, 2018  3:30 PM CST   [facility]z Maria [facility]    [address] [address] [phone]                 Requested Prescriptions   Pending Prescriptions Disp Refills     lisinopril (PRINIVIL/ZESTRIL) 10 MG tablet [Pharmacy Med Name: LISINOPRIL 10 MG TABLET] 45 tablet 2     Sig: TAKE ONE-HALF TABLET BY MOUTH DAILY    ACE Inhibitors (Including Combos) Protocol Failed    11/9/2018  1:27 AM       Failed - Normal serum creatinine on file in past 12 months    Recent Labs   Lab Test  09/18/17   0800   CR  0.64            Failed - Normal serum potassium on file in past 12 months    Recent Labs   Lab Test  09/18/17   0800   POTASSIUM  4.4            Passed - Blood pressure under 140/90 in past 12 months    BP Readings from Last 3 Encounters:   01/15/18 123/72   09/27/17 125/74   07/25/17 126/75                Passed - Recent (12 mo) or future (30 days) visit within the authorizing provider's specialty    Patient had office visit in the last 12 months or has a visit in the next 30 days with authorizing provider or within the authorizing provider's specialty.  See \"Patient Info\" tab in inbasket, or \"Choose Columns\" in Meds & Orders section of the refill encounter.             Passed - Patient is age 18 or older       Passed - No active pregnancy on record       Passed - No positive pregnancy test in past 12 months        PHQ-9 SCORE 9/28/2015   Total Score 0     "

## 2018-11-09 NOTE — TELEPHONE ENCOUNTER
A 30 day supply is given, patient is due for an office visit.  Patient has an appointment scheduled for 11/14/2018.  TASNEEM Jenkins, RN  [employer] [facility]

## 2018-11-14 ENCOUNTER — OFFICE VISIT (OUTPATIENT)
Dept: FAMILY MEDICINE | Facility: CLINIC | Age: 58
End: 2018-11-14
Payer: COMMERCIAL

## 2018-11-14 VITALS
BODY MASS INDEX: 26.58 KG/M2 | OXYGEN SATURATION: 100 % | SYSTOLIC BLOOD PRESSURE: 116 MMHG | DIASTOLIC BLOOD PRESSURE: 74 MMHG | WEIGHT: 150 LBS | HEART RATE: 68 BPM | TEMPERATURE: 97.9 F | HEIGHT: 63 IN

## 2018-11-14 DIAGNOSIS — I10 ESSENTIAL HYPERTENSION WITH GOAL BLOOD PRESSURE LESS THAN 140/90: ICD-10-CM

## 2018-11-14 DIAGNOSIS — Z23 NEED FOR PROPHYLACTIC VACCINATION AND INOCULATION AGAINST INFLUENZA: ICD-10-CM

## 2018-11-14 DIAGNOSIS — E11.9 TYPE 2 DIABETES MELLITUS WITHOUT COMPLICATION, WITHOUT LONG-TERM CURRENT USE OF INSULIN (H): Primary | ICD-10-CM

## 2018-11-14 PROCEDURE — 99213 OFFICE O/P EST LOW 20 MIN: CPT | Mod: 25 | Performed by: PHYSICIAN ASSISTANT

## 2018-11-14 PROCEDURE — 90471 IMMUNIZATION ADMIN: CPT | Performed by: PHYSICIAN ASSISTANT

## 2018-11-14 PROCEDURE — 90686 IIV4 VACC NO PRSV 0.5 ML IM: CPT | Performed by: PHYSICIAN ASSISTANT

## 2018-11-14 RX ORDER — LISINOPRIL 10 MG/1
TABLET ORAL
Qty: 45 TABLET | Refills: 3 | Status: SHIPPED | OUTPATIENT
Start: 2018-11-14 | End: 2020-03-19

## 2018-11-14 RX ORDER — GLIMEPIRIDE 4 MG/1
8 TABLET ORAL
Qty: 180 TABLET | Refills: 3 | Status: SHIPPED | OUTPATIENT
Start: 2018-11-14 | End: 2020-02-12

## 2018-11-14 NOTE — MR AVS SNAPSHOT
After Visit Summary   11/14/2018    Rubi Nicolas    MRN: [id number]           Patient Information     Date Of Birth          1960        Visit Information        Provider Department      11/14/2018 3:30 PM Luz Maria Demarco PA-C [facility]        Today's Diagnoses     Type 2 diabetes mellitus without complication, without long-term current use of insulin (H)    -  1    Essential hypertension        Essential hypertension with goal blood pressure less than 140/90          Care Instructions    Shingrex is the new shingles vaccine; check with insurace  Get at a pharmacy          Follow-ups after your visit        Follow-up notes from your care team     Return in about 6 months (around 5/14/2019) for Physical Exam.      Who to contact     If you have questions or need follow up information about today's clinic visit or your schedule please contact [facility] directly at [phone].  Normal or non-critical lab and imaging results will be communicated to you by MyChart, letter or phone within 4 business days after the clinic has received the results. If you do not hear from us within 7 days, please contact the clinic through [employer] or phone. If you have a critical or abnormal lab result, we will notify you by phone as soon as possible.  Submit refill requests through [employer] or call your pharmacy and they will forward the refill request to us. Please allow 3 business days for your refill to be completed.          Additional Information About Your Visit        MyChart Information     [employer] gives you secure access to your electronic health record. If you see a primary care provider, you can also send messages to your care team and make appointments. If you have questions, please call your primary care clinic.  If you do not have a primary care provider, please call [phone] and they will assist you.        Care EveryWhere ID     This is your Care EveryWhere ID. This  "could be used by other organizations to access your [facility] medical records  [id number]        Your Vitals Were     Pulse Temperature Height Pulse Oximetry BMI (Body Mass Index)       68 97.9  F (36.6  C) (Oral) 5' 2.5\" (1.588 m) 100% 27 kg/m2        Blood Pressure from Last 3 Encounters:   11/14/18 116/74   01/15/18 123/72   09/27/17 125/74    Weight from Last 3 Encounters:   11/14/18 150 lb (68 kg)   01/15/18 151 lb (68.5 kg)   09/27/17 150 lb (68 kg)              Today, you had the following     No orders found for display         Today's Medication Changes          These changes are accurate as of 11/14/18  3:47 PM.  If you have any questions, ask your nurse or doctor.               These medicines have changed or have updated prescriptions.        Dose/Directions    metFORMIN 1000 MG tablet   Commonly known as:  GLUCOPHAGE   This may have changed:  Another medication with the same name was removed. Continue taking this medication, and follow the directions you see here.   Used for:  Type 2 diabetes mellitus without complication, without long-term current use of insulin (H)   Changed by:  Luz Maria Demarco PA-C        TAKE 1 TABLET BY MOUTH TWICE A DAY WITH MEALS   Quantity:  180 tablet   Refills:  1       triamcinolone 0.1 % cream   Commonly known as:  KENALOG   This may have changed:    - when to take this  - reasons to take this  - additional instructions   Used for:  Eczema, unspecified type        Apply sparingly to affected area three times daily for 14 days.   Quantity:  30 g   Refills:  3            Where to get your medicines      These medications were sent to [facility] [place] - [address]  [address] [place] [address]     Phone:  [phone]     glimepiride 4 MG tablet    lisinopril 10 MG tablet    metFORMIN 1000 MG tablet                Primary Care Provider Office Phone # Fax #    Luz Maria Demarco PA-C [phone] [address]  [place] " [address]        Equal Access to Services     [facility]: Hadii laura obrien martinasusan Jenniferali, watwinda luqadaha, qaybta kashiraantelmo griffin. So [facility] [phone].    ATENCIÓN: Si habla español, tiene a king disposición servicios gratuitos de asistencia lingüística. Llame al [phone].    We comply with applicable federal civil rights laws and [place] laws. We do not discriminate on the basis of race, color, national origin, age, disability, sex, sexual orientation, or gender identity.            Thank you!     Thank you for choosing [facility]  for your care. Our goal is always to provide you with excellent care. Hearing back from our patients is one way we can continue to improve our services. Please take a few minutes to complete the written survey that you may receive in the mail after your visit with us. Thank you!             Your Updated Medication List - Protect others around you: Learn how to safely use, store and throw away your medicines at www.disposemymeds.org.          This list is accurate as of 11/14/18  3:47 PM.  Always use your most recent med list.                   Brand Name Dispense Instructions for use Diagnosis    alcohol swab prep pads     100 each    Use to swab area of injection/tim as directed.    Type 2 diabetes mellitus without complication, without long-term current use of insulin (H)       aspirin 81 MG tablet     90 tablet    Take 1 tablet (81 mg) by mouth daily        blood glucose calibration solution    NO BRAND SPECIFIED    1 Bottle    To accompany: Blood Glucose Monitor Brands: per insurance.    Type 2 diabetes mellitus without complication, without long-term current use of insulin (H)       blood glucose monitoring meter device kit    no brand specified    1 kit    Use to test blood sugar 2 times daily or as directed. Preferred blood glucose meter OR supplies to accompany: Blood Glucose Monitor Brands: per insurance.    Type 2  diabetes mellitus without complication, without long-term current use of insulin (H)       glimepiride 4 MG tablet    AMARYL    180 tablet    Take 2 tablets (8 mg) by mouth every morning (before breakfast)    Type 2 diabetes mellitus without complication, without long-term current use of insulin (H)       lisinopril 10 MG tablet    PRINIVIL/ZESTRIL    45 tablet    TAKE ONE-HALF TABLET BY MOUTH DAILY    Essential hypertension with goal blood pressure less than 140/90       metFORMIN 1000 MG tablet    GLUCOPHAGE    180 tablet    TAKE 1 TABLET BY MOUTH TWICE A DAY WITH MEALS    Type 2 diabetes mellitus without complication, without long-term current use of insulin (H)       MULTIVITAMIN PO      Take 1 tablet by mouth daily.        OMEPRAZOLE PO           simvastatin 40 MG tablet    ZOCOR    135 tablet    TAKE ONE AND ONE-HALF TABLETS BY MOUTH DAILY    Hyperlipidemia LDL goal <100       thin lancets    NO BRAND SPECIFIED    200 each    Use with lanceting device. To accompany: Blood Glucose Monitor Brands: per insurance.    Type 2 diabetes mellitus without complication, without long-term current use of insulin (H)       triamcinolone 0.1 % cream    KENALOG    30 g    Apply sparingly to affected area three times daily for 14 days.    Eczema, unspecified type       * TRUETEST test strip   Generic drug:  blood glucose monitoring     100 strip    TEST TWICE DAILY    Type 2 diabetes, HbA1c goal < 7% (H)       * blood glucose monitoring test strip    no brand specified    100 strip    Use to test blood sugar 2 times daily or as directed. To accompany: Blood Glucose Monitor Brands: per insurance.    Type 2 diabetes mellitus without complication, without long-term current use of insulin (H)       Urea 40 % Crea     198.6 g    applt ohands and feet daily    Palmoplantar keratoderma       * Notice:  This list has 2 medication(s) that are the same as other medications prescribed for you. Read the directions carefully, and ask your  doctor or other care provider to review them with you.

## 2018-11-14 NOTE — PROGRESS NOTES
HPI: Rubi is a pleasant 56 yo female here for follow-up diabetes and HTN  She is checking her blood sugars and getting 140-165 in the morning and 110-120 later in the day  She has a rare episode of hypoglycemia marbin if hiking or active  She had her eye exam last month  She is trying to follow [employer]  She is biking and walking for exercise.   We increased amaryl at last OV and A1c is better    Lab Results   Component Value Date    A1C 6.9 11/09/2018    A1C 7.2 04/16/2018    A1C 7.4 12/19/2017    A1C 7.3 09/18/2017    A1C 7.1 05/30/2017     Past Medical History:   Diagnosis Date     Atypical squamous cells of undetermined significance (ASCUS) on Papanicolaou smear of cervix 10/9/2015     GERD (gastroesophageal reflux disease)      HTN (hypertension)      Hyperlipidemia      Type 2 diabetes mellitus without complications (H) 10/22/2015     Past Surgical History:   Procedure Laterality Date     APPENDECTOMY  1976    at time of USO     ARTHROSCOPY KNEE RT/LT       C LAP OVARIAN CYSTOTOMY  1976    salpingooopherectomy for large dermoid     COLONOSCOPY N/A 11/9/2015    Procedure: COLONOSCOPY;  Surgeon: Kate Redmond MD;  Location:  GI     Social History   Substance Use Topics     Smoking status: Never Smoker     Smokeless tobacco: Never Used     Alcohol use No     Current Outpatient Prescriptions   Medication Sig Dispense Refill     alcohol swab prep pads Use to swab area of injection/tim as directed. 100 each 3     aspirin 81 MG tablet Take 1 tablet (81 mg) by mouth daily 90 tablet 3     blood glucose calibration (NO BRAND SPECIFIED) solution To accompany: Blood Glucose Monitor Brands: per insurance. 1 Bottle 3     blood glucose monitoring (NO BRAND SPECIFIED) meter device kit Use to test blood sugar 2 times daily or as directed. Preferred blood glucose meter OR supplies to accompany: Blood Glucose Monitor Brands: per insurance. 1 kit 0     blood glucose monitoring (NO BRAND SPECIFIED) test strip Use to  "test blood sugar 2 times daily or as directed. To accompany: Blood Glucose Monitor Brands: per insurance. 100 strip 3     glimepiride (AMARYL) 4 MG tablet Take 2 tablets (8 mg) by mouth every morning (before breakfast) 180 tablet 3     lisinopril (PRINIVIL/ZESTRIL) 10 MG tablet TAKE ONE-HALF TABLET BY MOUTH DAILY 45 tablet 3     metFORMIN (GLUCOPHAGE) 1000 MG tablet TAKE 1 TABLET BY MOUTH TWICE A DAY WITH MEALS 180 tablet 1     Multiple Vitamin (MULTIVITAMIN OR) Take 1 tablet by mouth daily.       OMEPRAZOLE PO        simvastatin (ZOCOR) 40 MG tablet TAKE ONE AND ONE-HALF TABLETS BY MOUTH DAILY 135 tablet 2     thin (NO BRAND SPECIFIED) lancets Use with lanceting device. To accompany: Blood Glucose Monitor Brands: per insurance. 200 each 6     triamcinolone (KENALOG) 0.1 % cream Apply sparingly to affected area three times daily for 14 days. (Patient taking differently: as needed Apply sparingly to affected area three times daily for 14 days.) 30 g 3     TRUETEST test strip TEST TWICE DAILY 100 strip 3     Urea 40 % CREA applt ohands and feet daily 198.6 g 11     [DISCONTINUED] glimepiride (AMARYL) 4 MG tablet Take 2 tablets (8 mg) by mouth every morning (before breakfast) 180 tablet 3     [DISCONTINUED] lisinopril (PRINIVIL/ZESTRIL) 10 MG tablet TAKE ONE-HALF TABLET BY MOUTH DAILY 45 tablet 0     [DISCONTINUED] metFORMIN (GLUCOPHAGE) 1000 MG tablet TAKE 1 TABLET BY MOUTH TWICE A DAY WITH MEALS 60 tablet 0     [DISCONTINUED] metFORMIN (GLUCOPHAGE) 1000 MG tablet TAKE 1 TABLET BY MOUTH TWICE A DAY WITH MEALS. DUE FOR FASTING LIPIDS. 90 tablet 0     Allergies   Allergen Reactions     Keflex [Cephalexin Hcl] Hives     Penicillins Hives     Ragweeds      Sneezing etc.     FAMILY HISTORY NOTED AND REVIEWED    PHYSICAL EXAM:    /74 (BP Location: Right arm, Patient Position: Sitting, Cuff Size: Adult Regular)  Pulse 68  Temp 97.9  F (36.6  C) (Oral)  Ht 5' 2.5\" (1.588 m)  Wt 150 lb (68 kg)  SpO2 100%  BMI 27 " kg/m2    Patient appears non toxic    Assessment and Plan:     (E11.9) Type 2 diabetes mellitus without complication, without long-term current use of insulin (H)  (primary encounter diagnosis)  Comment: pt at goal. Eye exam up to date. On a statin and asa. F/u in April for px  Plan: glimepiride (AMARYL) 4 MG tablet, metFORMIN         (GLUCOPHAGE) 1000 MG tablet refilled            (I10) Essential hypertension with goal blood pressure less than 140/90  Comment:   Plan: lisinopril (PRINIVIL/ZESTRIL) 10 MG tablet        refilled    Patient Instructions   Shingrex is the new shingles vaccine; check with insurace  Get at a pharmacy        Luz Maria Demarco PA-C

## 2018-11-14 NOTE — PROGRESS NOTES
Injectable Influenza Immunization Documentation    1.  Is the person to be vaccinated sick today?   No    2. Does the person to be vaccinated have an allergy to a component   of the vaccine?   No  Egg Allergy Algorithm Link    3. Has the person to be vaccinated ever had a serious reaction   to influenza vaccine in the past?   No    4. Has the person to be vaccinated ever had Guillain-Barré syndrome?   No    Form completed by Huber Mckay CMA on 11/14/2018 at 4:54 PM

## 2019-02-15 ENCOUNTER — HEALTH MAINTENANCE LETTER (OUTPATIENT)
Age: 59
End: 2019-02-15

## 2019-05-28 DIAGNOSIS — E11.9 TYPE 2 DIABETES MELLITUS WITHOUT COMPLICATION, WITHOUT LONG-TERM CURRENT USE OF INSULIN (H): ICD-10-CM

## 2019-05-28 NOTE — TELEPHONE ENCOUNTER
"blood glucose monitoring (SOFTCLIX) lancets    Last Written Prescription Date:  11/22/2017  Last Fill Quantity: 200,  # refills: 6   Last office visit: 11/14/2018 with prescribing provider:  Nilam   Future Office Visit:  Unknown     Requested Prescriptions   Pending Prescriptions Disp Refills     blood glucose monitoring (SOFTCLIX) lancets [Pharmacy Med Name: ACCU-CHEK SOFTCLIX LANCETS] 200 each 0     Sig: TESTING 2X DAILY USE WITH LANCETING DEVICE       Diabetic Supplies Protocol Failed - 5/28/2019  2:10 PM        Failed - Recent (6 mo) or future (30 days) visit within the authorizing provider's specialty     Patient had office visit in the last 6 months or has a visit in the next 30 days with authorizing provider.  See \"Patient Info\" tab in inbasket, or \"Choose Columns\" in Meds & Orders section of the refill encounter.            Passed - Medication is active on med list        Passed - Patient is 18 years of age or older          "

## 2019-05-28 NOTE — LETTER
[facility]  [address] [facility] [address]  Phone: [phone]  May 30, 2019      Rubi Nicolas  [address] [place] [facility] [address]      Dear Rubi,    We care about your health and have reviewed your health plan including your medical conditions, medications, and lab results.  Based on this review, it is recommended that you follow up regarding the following health topic(s):  -Diabetes    We recommend you take the following action(s):  -schedule a FOLLOWUP OFFICE APPOINTMENT.  We will perform the following labs:  A1c, ALT/AST and Microablumin.     Please call us at [phone] (or use [employer]) to address the above recommendations.     Thank you for trusting [facility] and we appreciate the opportunity to serve you.  We look forward to supporting your healthcare needs in the future.    Healthy Regards,    Your Health Care Team  [facility]

## 2019-05-29 ENCOUNTER — DOCUMENTATION ONLY (OUTPATIENT)
Dept: FAMILY MEDICINE | Facility: CLINIC | Age: 59
End: 2019-05-29

## 2019-05-29 DIAGNOSIS — I10 ESSENTIAL HYPERTENSION: Primary | ICD-10-CM

## 2019-05-29 DIAGNOSIS — E11.9 TYPE 2 DIABETES MELLITUS WITHOUT COMPLICATION, WITHOUT LONG-TERM CURRENT USE OF INSULIN (H): ICD-10-CM

## 2019-05-29 DIAGNOSIS — E78.5 HYPERLIPIDEMIA LDL GOAL <100: ICD-10-CM

## 2019-05-30 RX ORDER — LANCETS
EACH MISCELLANEOUS
Qty: 100 EACH | Refills: 0 | Status: ON HOLD | OUTPATIENT
Start: 2019-05-30 | End: 2022-02-26

## 2019-05-30 NOTE — TELEPHONE ENCOUNTER
Medication is being filled for 1 time refill only due to:  Patient needs to be seen because due for diabetes follow up. Sent letter reminder due for diabetes f/u.

## 2019-05-31 DIAGNOSIS — E78.5 HYPERLIPIDEMIA LDL GOAL <100: ICD-10-CM

## 2019-05-31 DIAGNOSIS — E11.9 TYPE 2 DIABETES MELLITUS WITHOUT COMPLICATION, WITHOUT LONG-TERM CURRENT USE OF INSULIN (H): ICD-10-CM

## 2019-05-31 DIAGNOSIS — I10 ESSENTIAL HYPERTENSION: ICD-10-CM

## 2019-05-31 LAB
ALBUMIN SERPL-MCNC: 3.7 G/DL (ref 3.4–5)
ALP SERPL-CCNC: 40 U/L (ref 40–150)
ALT SERPL W P-5'-P-CCNC: 35 U/L (ref 0–50)
ANION GAP SERPL CALCULATED.3IONS-SCNC: 11 MMOL/L (ref 3–14)
AST SERPL W P-5'-P-CCNC: 19 U/L (ref 0–45)
BILIRUB SERPL-MCNC: 0.3 MG/DL (ref 0.2–1.3)
BUN SERPL-MCNC: 17 MG/DL (ref 7–30)
CALCIUM SERPL-MCNC: 8.9 MG/DL (ref 8.5–10.1)
CHLORIDE SERPL-SCNC: 107 MMOL/L (ref 94–109)
CHOLEST SERPL-MCNC: 148 MG/DL
CO2 SERPL-SCNC: 22 MMOL/L (ref 20–32)
CREAT SERPL-MCNC: 0.66 MG/DL (ref 0.52–1.04)
ERYTHROCYTE [DISTWIDTH] IN BLOOD BY AUTOMATED COUNT: 13.1 % (ref 10–15)
GFR SERPL CREATININE-BSD FRML MDRD: >90 ML/MIN/{1.73_M2}
GLUCOSE SERPL-MCNC: 154 MG/DL (ref 70–99)
HBA1C MFR BLD: 6.5 % (ref 0–5.6)
HCT VFR BLD AUTO: 36.3 % (ref 35–47)
HDLC SERPL-MCNC: 58 MG/DL
HGB BLD-MCNC: 12.6 G/DL (ref 11.7–15.7)
LDLC SERPL CALC-MCNC: 74 MG/DL
MCH RBC QN AUTO: 30.7 PG (ref 26.5–33)
MCHC RBC AUTO-ENTMCNC: 34.7 G/DL (ref 31.5–36.5)
MCV RBC AUTO: 89 FL (ref 78–100)
NONHDLC SERPL-MCNC: 90 MG/DL
PLATELET # BLD AUTO: 280 10E9/L (ref 150–450)
POTASSIUM SERPL-SCNC: 4.2 MMOL/L (ref 3.4–5.3)
PROT SERPL-MCNC: 7.3 G/DL (ref 6.8–8.8)
RBC # BLD AUTO: 4.1 10E12/L (ref 3.8–5.2)
SODIUM SERPL-SCNC: 140 MMOL/L (ref 133–144)
TRIGL SERPL-MCNC: 80 MG/DL
WBC # BLD AUTO: 5.5 10E9/L (ref 4–11)

## 2019-05-31 PROCEDURE — 83036 HEMOGLOBIN GLYCOSYLATED A1C: CPT | Performed by: PHYSICIAN ASSISTANT

## 2019-05-31 PROCEDURE — 36415 COLL VENOUS BLD VENIPUNCTURE: CPT | Performed by: PHYSICIAN ASSISTANT

## 2019-05-31 PROCEDURE — 85027 COMPLETE CBC AUTOMATED: CPT | Performed by: PHYSICIAN ASSISTANT

## 2019-05-31 PROCEDURE — 80053 COMPREHEN METABOLIC PANEL: CPT | Performed by: PHYSICIAN ASSISTANT

## 2019-05-31 PROCEDURE — 80061 LIPID PANEL: CPT | Performed by: PHYSICIAN ASSISTANT

## 2019-06-18 ENCOUNTER — OFFICE VISIT (OUTPATIENT)
Dept: FAMILY MEDICINE | Facility: CLINIC | Age: 59
End: 2019-06-18
Payer: COMMERCIAL

## 2019-06-18 VITALS
HEIGHT: 63 IN | HEART RATE: 77 BPM | WEIGHT: 144 LBS | OXYGEN SATURATION: 97 % | DIASTOLIC BLOOD PRESSURE: 68 MMHG | TEMPERATURE: 99.1 F | SYSTOLIC BLOOD PRESSURE: 110 MMHG | BODY MASS INDEX: 25.52 KG/M2

## 2019-06-18 DIAGNOSIS — E78.5 HYPERLIPIDEMIA LDL GOAL <100: ICD-10-CM

## 2019-06-18 DIAGNOSIS — Z78.0 POSTMENOPAUSAL STATUS: ICD-10-CM

## 2019-06-18 DIAGNOSIS — E11.9 TYPE 2 DIABETES MELLITUS WITHOUT COMPLICATION, WITHOUT LONG-TERM CURRENT USE OF INSULIN (H): ICD-10-CM

## 2019-06-18 DIAGNOSIS — Z00.00 ROUTINE GENERAL MEDICAL EXAMINATION AT A HEALTH CARE FACILITY: Primary | ICD-10-CM

## 2019-06-18 DIAGNOSIS — Z12.4 SCREENING FOR MALIGNANT NEOPLASM OF CERVIX: ICD-10-CM

## 2019-06-18 PROCEDURE — 99396 PREV VISIT EST AGE 40-64: CPT | Performed by: PHYSICIAN ASSISTANT

## 2019-06-18 PROCEDURE — G0145 SCR C/V CYTO,THINLAYER,RESCR: HCPCS | Performed by: PHYSICIAN ASSISTANT

## 2019-06-18 PROCEDURE — 87624 HPV HI-RISK TYP POOLED RSLT: CPT | Performed by: PHYSICIAN ASSISTANT

## 2019-06-18 RX ORDER — SIMVASTATIN 40 MG
TABLET ORAL
Qty: 135 TABLET | Refills: 3 | Status: SHIPPED | OUTPATIENT
Start: 2019-06-18 | End: 2020-08-07

## 2019-06-18 ASSESSMENT — MIFFLIN-ST. JEOR: SCORE: 1184.37

## 2019-06-18 NOTE — PROGRESS NOTES
SUBJECTIVE:   CC: Rubi Nicolas is an 58 year old woman who presents for preventive health visit.     Pt is monitoring her blood sugars and getting 110-170  She rarely has hypoglycemia and only if she works out too hard or hasn't eaten in hours.      Healthy Habits:     Getting at least 3 servings of Calcium per day:  Yes    Bi-annual eye exam:  Yes    Dental care twice a year:  Yes    Sleep apnea or symptoms of sleep apnea:  None    Diet:  Carbohydrate counting (Mediterranean diet )    Frequency of exercise:  4-5 days/week    Duration of exercise:  30-45 minutes    Taking medications regularly:  Yes    Barriers to taking medications:  None    Medication side effects:  None    PHQ-2 Total Score: 0    Additional concerns today:  No              Today's PHQ-2 Score:   PHQ-2 ( 1999 [employer]) 6/18/2019   Q1: Little interest or pleasure in doing things 0   Q2: Feeling down, depressed or hopeless 0   PHQ-2 Score 0       Abuse: Current or Past(Physical, Sexual or Emotional)- No  Do you feel safe in your environment? Yes    Social History     Tobacco Use     Smoking status: Never Smoker     Smokeless tobacco: Never Used   Substance Use Topics     Alcohol use: No     Alcohol/week: 0.0 oz     If you drink alcohol do you typically have >3 drinks per day or >7 drinks per week? No    Alcohol Use 6/4/2015   Prescreen: >3 drinks/day or >7 drinks/week? The patient does not drink >3 drinks per day nor >7 drinks per week.       Reviewed orders with patient.  Reviewed health maintenance and updated orders accordingly - Yes      Pertinent mammograms are reviewed under the imaging tab.  History of abnormal Pap smear: NO - age 30- 65 PAP every 3 years recommended  PAP / HPV Latest Ref Rng & Units 10/5/2015 2/15/2013   PAP - NIL ASC-US(A)   HPV 16 DNA NEG Negative -   HPV 18 DNA NEG Negative -   OTHER HR HPV NEG Negative -     Reviewed and updated as needed this visit by clinical staff  Tobacco  Allergies  Meds  Med Hx          Reviewed and updated as needed this visit by Provider  Med Hx        Past Medical History:   Diagnosis Date     Atypical squamous cells of undetermined significance (ASCUS) on Papanicolaou smear of cervix 10/9/2015     GERD (gastroesophageal reflux disease)      HTN (hypertension)      Hyperlipidemia      Type 2 diabetes mellitus without complications (H) 10/22/2015     Past Surgical History:   Procedure Laterality Date     APPENDECTOMY  1976    at time of USO     ARTHROSCOPY KNEE RT/LT       C LAP OVARIAN CYSTOTOMY  1976    salpingooopherectomy for large dermoid     COLONOSCOPY N/A 11/9/2015    Procedure: COLONOSCOPY;  Surgeon: Kate Redmond MD;  Location:  GI     Current Outpatient Medications   Medication Sig Dispense Refill     alcohol swab prep pads Use to swab area of injection/tim as directed. 100 each 3     aspirin 81 MG tablet Take 1 tablet (81 mg) by mouth daily 90 tablet 3     blood glucose calibration (NO BRAND SPECIFIED) solution To accompany: Blood Glucose Monitor Brands: per insurance. 1 Bottle 3     blood glucose monitoring (NO BRAND SPECIFIED) meter device kit Use to test blood sugar 2 times daily or as directed. Preferred blood glucose meter OR supplies to accompany: Blood Glucose Monitor Brands: per insurance. 1 kit 0     blood glucose monitoring (NO BRAND SPECIFIED) test strip Use to test blood sugar 2 times daily or as directed. To accompany: Blood Glucose Monitor Brands: per insurance. 100 strip 3     blood glucose monitoring (SOFTCLIX) lancets TESTING 2X DAILY USE WITH LANCETING DEVICE 100 each 0     glimepiride (AMARYL) 4 MG tablet Take 2 tablets (8 mg) by mouth every morning (before breakfast) 180 tablet 3     lisinopril (PRINIVIL/ZESTRIL) 10 MG tablet TAKE ONE-HALF TABLET BY MOUTH DAILY 45 tablet 3     metFORMIN (GLUCOPHAGE) 1000 MG tablet TAKE 1 TABLET BY MOUTH TWICE A DAY WITH MEALS 180 tablet 1     Multiple Vitamin (MULTIVITAMIN OR) Take 1 tablet by mouth daily.        "OMEPRAZOLE PO        simvastatin (ZOCOR) 40 MG tablet TAKE ONE AND ONE-HALF TABLETS BY MOUTH DAILY 135 tablet 3     triamcinolone (KENALOG) 0.1 % cream Apply sparingly to affected area three times daily for 14 days. (Patient taking differently: as needed Apply sparingly to affected area three times daily for 14 days.) 30 g 3     TRUETEST test strip TEST TWICE DAILY 100 strip 3     Urea 40 % CREA applt ohands and feet daily 198.6 g 11         Review of Systems  CONSTITUTIONAL: NEGATIVE for fever, chills, change in weight  INTEGUMENTARY/SKIN: NEGATIVE for worrisome rashes, moles or lesions  EYES: NEGATIVE for vision changes or irritation  ENT: NEGATIVE for ear, mouth and throat problems  RESP: NEGATIVE for significant cough or SOB  BREAST: NEGATIVE for masses, tenderness or discharge  CV: NEGATIVE for chest pain, palpitations or peripheral edema  GI: NEGATIVE for nausea, abdominal pain, heartburn, or change in bowel habits  : NEGATIVE for unusual urinary or vaginal symptoms. No vaginal bleeding.  MUSCULOSKELETAL: NEGATIVE for significant arthralgias or myalgia  NEURO: NEGATIVE for weakness, dizziness or paresthesias  PSYCHIATRIC: NEGATIVE for changes in mood or affect      OBJECTIVE:   /68 (BP Location: Right arm, Patient Position: Sitting, Cuff Size: Adult Regular)   Pulse 77   Temp 99.1  F (37.3  C) (Oral)   Ht 1.588 m (5' 2.5\")   Wt 65.3 kg (144 lb)   SpO2 97%   Breastfeeding? No   BMI 25.92 kg/m    Physical Exam  GENERAL APPEARANCE: healthy, alert and no distress  EYES: Eyes grossly normal to inspection, PERRL and conjunctivae and sclerae normal  HENT: ear canals and TM's normal, nose and mouth without ulcers or lesions, oropharynx clear and oral mucous membranes moist  NECK: no adenopathy, no asymmetry, masses, or scars and thyroid normal to palpation  RESP: lungs clear to auscultation - no rales, rhonchi or wheezes  BREAST: normal without masses, tenderness or nipple discharge and no palpable " axillary masses or adenopathy  CV: regular rate and rhythm, normal S1 S2, no S3 or S4, no murmur, click or rub, no peripheral edema and peripheral pulses strong  ABDOMEN: soft, nontender, no hepatosplenomegaly, no masses and bowel sounds normal  : no vulvar lesions. Pap done and sent. Bimanual exam neg for adnexal masses  MS: no musculoskeletal defects are noted and gait is age appropriate without ataxia  SKIN: no suspicious lesions or rashes  NEURO: Normal strength and tone, sensory exam grossly normal, mentation intact and speech normal  PSYCH: mentation appears normal and affect normal/bright    Results for orders placed or performed in visit on 05/31/19   **A1C FUTURE anytime   Result Value Ref Range    Hemoglobin A1C 6.5 (H) 0 - 5.6 %   Lipid Profile   Result Value Ref Range    Cholesterol 148 <200 mg/dL    Triglycerides 80 <150 mg/dL    HDL Cholesterol 58 >49 mg/dL    LDL Cholesterol Calculated 74 <100 mg/dL    Non HDL Cholesterol 90 <130 mg/dL   CBC with platelets   Result Value Ref Range    WBC 5.5 4.0 - 11.0 10e9/L    RBC Count 4.10 3.8 - 5.2 10e12/L    Hemoglobin 12.6 11.7 - 15.7 g/dL    Hematocrit 36.3 35.0 - 47.0 %    MCV 89 78 - 100 fl    MCH 30.7 26.5 - 33.0 pg    MCHC 34.7 31.5 - 36.5 g/dL    RDW 13.1 10.0 - 15.0 %    Platelet Count 280 150 - 450 10e9/L   Comprehensive metabolic panel   Result Value Ref Range    Sodium 140 133 - 144 mmol/L    Potassium 4.2 3.4 - 5.3 mmol/L    Chloride 107 94 - 109 mmol/L    Carbon Dioxide 22 20 - 32 mmol/L    Anion Gap 11 3 - 14 mmol/L    Glucose 154 (H) 70 - 99 mg/dL    Urea Nitrogen 17 7 - 30 mg/dL    Creatinine 0.66 0.52 - 1.04 mg/dL    GFR Estimate >90 >60 mL/min/[1.73_m2]    GFR Estimate If Black >90 >60 mL/min/[1.73_m2]    Calcium 8.9 8.5 - 10.1 mg/dL    Bilirubin Total 0.3 0.2 - 1.3 mg/dL    Albumin 3.7 3.4 - 5.0 g/dL    Protein Total 7.3 6.8 - 8.8 g/dL    Alkaline Phosphatase 40 40 - 150 U/L    ALT 35 0 - 50 U/L    AST 19 0 - 45 U/L         ASSESSMENT/PLAN:  "  Assessment and Plan:     (Z00.00) Routine medical examination at a health care facility  Plan: recd annual mammogram. Colonoscopy up to date. Discussed shingrex.    (Z12.4) Screening for malignant neoplasm of cervix  (primary encounter diagnosis)  Comment:   Plan: Pap imaged thin layer screen with HPV -         recommended age 30 - 65 years (select HPV order        below), HPV High Risk Types DNA Cervical            (E11.9) Type 2 diabetes mellitus without complication, without long-term current use of insulin (H)  Comment:   Plan: metFORMIN (GLUCOPHAGE) 1000 MG tablet            (E78.5) Hyperlipidemia LDL goal <100  Comment:   Plan: simvastatin (ZOCOR) 40 MG tablet            (Z78.0) Postmenopausal status  Comment:   Plan: DX Hip/Pelvis/Spine                COUNSELING:  Reviewed preventive health counseling, as reflected in patient instructions    Estimated body mass index is 25.92 kg/m  as calculated from the following:    Height as of this encounter: 1.588 m (5' 2.5\").    Weight as of this encounter: 65.3 kg (144 lb).         reports that she has never smoked. She has never used smokeless tobacco.      Counseling Resources:  ATP IV Guidelines  Pooled Cohorts Equation Calculator  Breast Cancer Risk Calculator  FRAX Risk Assessment  ICSI Preventive Guidelines  Dietary Guidelines for Americans, 2010  USDA's MyPlate  ASA Prophylaxis  Lung CA Screening    Luz Maria Demarco PA-C  [facility]  "

## 2019-06-20 LAB
COPATH REPORT: NORMAL
PAP: NORMAL

## 2019-06-21 LAB
FINAL DIAGNOSIS: NORMAL
HPV HR 12 DNA CVX QL NAA+PROBE: NEGATIVE
HPV16 DNA SPEC QL NAA+PROBE: NEGATIVE
HPV18 DNA SPEC QL NAA+PROBE: NEGATIVE
SPECIMEN DESCRIPTION: NORMAL
SPECIMEN SOURCE CVX/VAG CYTO: NORMAL

## 2019-09-05 ENCOUNTER — ANCILLARY PROCEDURE (OUTPATIENT)
Dept: MAMMOGRAPHY | Facility: CLINIC | Age: 59
End: 2019-09-05
Attending: PHYSICIAN ASSISTANT
Payer: COMMERCIAL

## 2019-09-05 ENCOUNTER — ANCILLARY PROCEDURE (OUTPATIENT)
Dept: BONE DENSITY | Facility: CLINIC | Age: 59
End: 2019-09-05
Attending: PHYSICIAN ASSISTANT
Payer: COMMERCIAL

## 2019-09-05 DIAGNOSIS — Z12.31 VISIT FOR SCREENING MAMMOGRAM: ICD-10-CM

## 2019-09-05 DIAGNOSIS — Z78.0 POSTMENOPAUSAL STATUS: ICD-10-CM

## 2019-09-05 PROCEDURE — 77067 SCR MAMMO BI INCL CAD: CPT | Mod: TC

## 2019-09-05 PROCEDURE — 77080 DXA BONE DENSITY AXIAL: CPT | Mod: TC

## 2019-09-28 ENCOUNTER — HEALTH MAINTENANCE LETTER (OUTPATIENT)
Age: 59
End: 2019-09-28

## 2019-10-27 ENCOUNTER — HEALTH MAINTENANCE LETTER (OUTPATIENT)
Age: 59
End: 2019-10-27

## 2020-01-21 ENCOUNTER — TELEPHONE (OUTPATIENT)
Dept: FAMILY MEDICINE | Facility: CLINIC | Age: 60
End: 2020-01-21

## 2020-01-21 DIAGNOSIS — E11.9 TYPE 2 DIABETES MELLITUS WITHOUT COMPLICATION, WITHOUT LONG-TERM CURRENT USE OF INSULIN (H): ICD-10-CM

## 2020-01-21 DIAGNOSIS — Z00.00 ROUTINE GENERAL MEDICAL EXAMINATION AT A HEALTH CARE FACILITY: ICD-10-CM

## 2020-01-21 DIAGNOSIS — I10 ESSENTIAL HYPERTENSION: Primary | ICD-10-CM

## 2020-01-21 DIAGNOSIS — E78.5 HYPERLIPIDEMIA LDL GOAL <100: ICD-10-CM

## 2020-01-21 NOTE — TELEPHONE ENCOUNTER
PCP,    Pt has OV scheduled 1/24/20. She would like her labs checked prior to OV  Please review and authorize if appropriate.    Thank you,   Reji FERNANDEZ RN

## 2020-01-21 NOTE — TELEPHONE ENCOUNTER
Reason for Call: Request for an order or referral:    Order or referral being requested: A1C, cholesterol    Date needed: before my next appointment on 01.24.20    Has the patient been seen by the PCP for this problem? NO    Additional comments: Pt is requesting orders for a diabetes check. Please place orders    Phone number Patient can be reached at:  Cell number on file:    Telephone Information:   Mobile [phone]     Best Time:  any    Can we leave a detailed message on this number?  YES    Call taken on 1/21/2020 at 3:36 PM by Yas Galvan

## 2020-01-24 DIAGNOSIS — E11.9 TYPE 2 DIABETES MELLITUS WITHOUT COMPLICATION, WITHOUT LONG-TERM CURRENT USE OF INSULIN (H): ICD-10-CM

## 2020-01-24 DIAGNOSIS — E78.5 HYPERLIPIDEMIA LDL GOAL <100: ICD-10-CM

## 2020-01-24 DIAGNOSIS — Z00.00 ROUTINE GENERAL MEDICAL EXAMINATION AT A HEALTH CARE FACILITY: ICD-10-CM

## 2020-01-24 LAB
ALBUMIN SERPL-MCNC: 3.6 G/DL (ref 3.4–5)
ALP SERPL-CCNC: 43 U/L (ref 40–150)
ALT SERPL W P-5'-P-CCNC: 36 U/L (ref 0–50)
ANION GAP SERPL CALCULATED.3IONS-SCNC: 7 MMOL/L (ref 3–14)
AST SERPL W P-5'-P-CCNC: 18 U/L (ref 0–45)
BILIRUB SERPL-MCNC: 0.2 MG/DL (ref 0.2–1.3)
BUN SERPL-MCNC: 18 MG/DL (ref 7–30)
CALCIUM SERPL-MCNC: 9.2 MG/DL (ref 8.5–10.1)
CHLORIDE SERPL-SCNC: 105 MMOL/L (ref 94–109)
CHOLEST SERPL-MCNC: 172 MG/DL
CO2 SERPL-SCNC: 26 MMOL/L (ref 20–32)
CREAT SERPL-MCNC: 0.6 MG/DL (ref 0.52–1.04)
CREAT UR-MCNC: 75 MG/DL
ERYTHROCYTE [DISTWIDTH] IN BLOOD BY AUTOMATED COUNT: 12.6 % (ref 10–15)
GFR SERPL CREATININE-BSD FRML MDRD: >90 ML/MIN/{1.73_M2}
GLUCOSE SERPL-MCNC: 199 MG/DL (ref 70–99)
HBA1C MFR BLD: 7.7 % (ref 0–5.6)
HCT VFR BLD AUTO: 36.9 % (ref 35–47)
HDLC SERPL-MCNC: 59 MG/DL
HGB BLD-MCNC: 12.5 G/DL (ref 11.7–15.7)
LDLC SERPL CALC-MCNC: 77 MG/DL
MCH RBC QN AUTO: 30.1 PG (ref 26.5–33)
MCHC RBC AUTO-ENTMCNC: 33.9 G/DL (ref 31.5–36.5)
MCV RBC AUTO: 89 FL (ref 78–100)
MICROALBUMIN UR-MCNC: 14 MG/L
MICROALBUMIN/CREAT UR: 19.2 MG/G CR (ref 0–25)
NONHDLC SERPL-MCNC: 113 MG/DL
PLATELET # BLD AUTO: 294 10E9/L (ref 150–450)
POTASSIUM SERPL-SCNC: 4.2 MMOL/L (ref 3.4–5.3)
PROT SERPL-MCNC: 7.3 G/DL (ref 6.8–8.8)
RBC # BLD AUTO: 4.15 10E12/L (ref 3.8–5.2)
SODIUM SERPL-SCNC: 138 MMOL/L (ref 133–144)
TRIGL SERPL-MCNC: 178 MG/DL
TSH SERPL DL<=0.005 MIU/L-ACNC: 3.33 MU/L (ref 0.4–4)
WBC # BLD AUTO: 6.2 10E9/L (ref 4–11)

## 2020-01-24 PROCEDURE — 84443 ASSAY THYROID STIM HORMONE: CPT | Performed by: PHYSICIAN ASSISTANT

## 2020-01-24 PROCEDURE — 85027 COMPLETE CBC AUTOMATED: CPT | Performed by: PHYSICIAN ASSISTANT

## 2020-01-24 PROCEDURE — 80061 LIPID PANEL: CPT | Performed by: PHYSICIAN ASSISTANT

## 2020-01-24 PROCEDURE — 80053 COMPREHEN METABOLIC PANEL: CPT | Performed by: PHYSICIAN ASSISTANT

## 2020-01-24 PROCEDURE — 82043 UR ALBUMIN QUANTITATIVE: CPT | Performed by: PHYSICIAN ASSISTANT

## 2020-01-24 PROCEDURE — 36415 COLL VENOUS BLD VENIPUNCTURE: CPT | Performed by: PHYSICIAN ASSISTANT

## 2020-01-24 PROCEDURE — 83036 HEMOGLOBIN GLYCOSYLATED A1C: CPT | Performed by: PHYSICIAN ASSISTANT

## 2020-01-28 ENCOUNTER — OFFICE VISIT (OUTPATIENT)
Dept: FAMILY MEDICINE | Facility: CLINIC | Age: 60
End: 2020-01-28
Payer: COMMERCIAL

## 2020-01-28 VITALS
HEART RATE: 90 BPM | BODY MASS INDEX: 26.51 KG/M2 | TEMPERATURE: 97 F | OXYGEN SATURATION: 96 % | DIASTOLIC BLOOD PRESSURE: 79 MMHG | SYSTOLIC BLOOD PRESSURE: 137 MMHG | WEIGHT: 149.6 LBS | HEIGHT: 63 IN

## 2020-01-28 DIAGNOSIS — E11.9 TYPE 2 DIABETES MELLITUS WITHOUT COMPLICATION, WITHOUT LONG-TERM CURRENT USE OF INSULIN (H): Primary | ICD-10-CM

## 2020-01-28 PROCEDURE — 99213 OFFICE O/P EST LOW 20 MIN: CPT | Performed by: PHYSICIAN ASSISTANT

## 2020-01-28 ASSESSMENT — MIFFLIN-ST. JEOR: SCORE: 1204.77

## 2020-01-28 NOTE — PROGRESS NOTES
HPI: Rubi is a 58 yo female here for f/u DM  She hasn't been checking her blood sugars since they were fine this summer  She notes her diet changes in the winter (fewer salads)  She has been taking her metformin and amaryl  She recently noticed her sugars running in the 130s-200s  She is exercising regularly; walks over 3 miles per day on average  Thinks portions bigger and too many carbs  Wt is up 5lbs, but her lower wt was after a long hiking trip (70miles)    Lab Results   Component Value Date    A1C 7.7 01/24/2020    A1C 6.5 05/31/2019    A1C 6.9 11/09/2018    A1C 7.2 04/16/2018    A1C 7.4 12/19/2017     Recent Labs   Lab Test 01/24/20  0829 05/31/19  0900  06/01/15  0752 11/21/14  0918   CHOL 172 148   < > 169 152   HDL 59 58   < > 55 52   LDL 77 74   < > 88 73   TRIG 178* 80   < > 129 134   CHOLHDLRATIO  --   --   --  3.1 2.9    < > = values in this interval not displayed.     Lab Results   Component Value Date    WBC 6.2 01/24/2020     Lab Results   Component Value Date    RBC 4.15 01/24/2020     Lab Results   Component Value Date    HGB 12.5 01/24/2020     Lab Results   Component Value Date    HCT 36.9 01/24/2020     Lab Results   Component Value Date    MCV 89 01/24/2020     Lab Results   Component Value Date    MCH 30.1 01/24/2020     Lab Results   Component Value Date    MCHC 33.9 01/24/2020     Lab Results   Component Value Date    RDW 12.6 01/24/2020     Lab Results   Component Value Date     01/24/2020     Last Comprehensive Metabolic Panel:  Sodium   Date Value Ref Range Status   01/24/2020 138 133 - 144 mmol/L Final     Potassium   Date Value Ref Range Status   01/24/2020 4.2 3.4 - 5.3 mmol/L Final     Chloride   Date Value Ref Range Status   01/24/2020 105 94 - 109 mmol/L Final     Carbon Dioxide   Date Value Ref Range Status   01/24/2020 26 20 - 32 mmol/L Final     Anion Gap   Date Value Ref Range Status   01/24/2020 7 3 - 14 mmol/L Final     Glucose   Date Value Ref Range Status    01/24/2020 199 (H) 70 - 99 mg/dL Final     Urea Nitrogen   Date Value Ref Range Status   01/24/2020 18 7 - 30 mg/dL Final     Creatinine   Date Value Ref Range Status   01/24/2020 0.60 0.52 - 1.04 mg/dL Final     GFR Estimate   Date Value Ref Range Status   01/24/2020 >90 >60 mL/min/[1.73_m2] Final     Comment:     Non  GFR Calc  Starting 12/18/2018, serum creatinine based estimated GFR (eGFR) will be   calculated using the Chronic Kidney Disease Epidemiology Collaboration   (CKD-EPI) equation.       Calcium   Date Value Ref Range Status   01/24/2020 9.2 8.5 - 10.1 mg/dL Final     Bilirubin Total   Date Value Ref Range Status   01/24/2020 0.2 0.2 - 1.3 mg/dL Final     Alkaline Phosphatase   Date Value Ref Range Status   01/24/2020 43 40 - 150 U/L Final     ALT   Date Value Ref Range Status   01/24/2020 36 0 - 50 U/L Final     AST   Date Value Ref Range Status   01/24/2020 18 0 - 45 U/L Final       Past Medical History:   Diagnosis Date     Atypical squamous cells of undetermined significance (ASCUS) on Papanicolaou smear of cervix 10/9/2015     GERD (gastroesophageal reflux disease)      HTN (hypertension)      Hyperlipidemia      Type 2 diabetes mellitus without complications (H) 10/22/2015     Past Surgical History:   Procedure Laterality Date     APPENDECTOMY  1976    at time of USO     ARTHROSCOPY KNEE RT/LT       C LAP OVARIAN CYSTOTOMY  1976    salpingooopherectomy for large dermoid     COLONOSCOPY N/A 11/9/2015    Procedure: COLONOSCOPY;  Surgeon: Kate Redmond MD;  Location:  GI     Social History     Tobacco Use     Smoking status: Never Smoker     Smokeless tobacco: Never Used   Substance Use Topics     Alcohol use: No     Alcohol/week: 0.0 standard drinks     Current Outpatient Medications   Medication Sig Dispense Refill     alcohol swab prep pads Use to swab area of injection/tim as directed. 100 each 3     aspirin 81 MG tablet Take 1 tablet (81 mg) by mouth daily 90 tablet  "3     blood glucose calibration (NO BRAND SPECIFIED) solution To accompany: Blood Glucose Monitor Brands: per insurance. 1 Bottle 3     blood glucose monitoring (NO BRAND SPECIFIED) meter device kit Use to test blood sugar 2 times daily or as directed. Preferred blood glucose meter OR supplies to accompany: Blood Glucose Monitor Brands: per insurance. 1 kit 0     blood glucose monitoring (NO BRAND SPECIFIED) test strip Use to test blood sugar 2 times daily or as directed. To accompany: Blood Glucose Monitor Brands: per insurance. 100 strip 3     blood glucose monitoring (SOFTCLIX) lancets TESTING 2X DAILY USE WITH LANCETING DEVICE 100 each 0     glimepiride (AMARYL) 4 MG tablet Take 2 tablets (8 mg) by mouth every morning (before breakfast) 180 tablet 3     lisinopril (PRINIVIL/ZESTRIL) 10 MG tablet TAKE ONE-HALF TABLET BY MOUTH DAILY 45 tablet 3     metFORMIN (GLUCOPHAGE) 1000 MG tablet TAKE 1 TABLET BY MOUTH TWICE A DAY WITH MEALS 180 tablet 1     Multiple Vitamin (MULTIVITAMIN OR) Take 1 tablet by mouth daily.       OMEPRAZOLE PO        simvastatin (ZOCOR) 40 MG tablet TAKE ONE AND ONE-HALF TABLETS BY MOUTH DAILY 135 tablet 3     triamcinolone (KENALOG) 0.1 % cream Apply sparingly to affected area three times daily for 14 days. (Patient taking differently: as needed Apply sparingly to affected area three times daily for 14 days.) 30 g 3     TRUETEST test strip TEST TWICE DAILY 100 strip 3     Urea 40 % CREA applt ohands and feet daily 198.6 g 11     Allergies   Allergen Reactions     Keflex [Cephalexin Hcl] Hives     Penicillins Hives     Ragweeds      Sneezing etc.     FAMILY HISTORY NOTED AND REVIEWED    PHYSICAL EXAM:    /79 (BP Location: Right arm, Patient Position: Sitting, Cuff Size: Adult Regular)   Pulse 90   Temp 97  F (36.1  C) (Oral)   Ht 1.588 m (5' 2.5\")   Wt 67.9 kg (149 lb 9.6 oz)   SpO2 96%   Breastfeeding No   BMI 26.93 kg/m      Patient appears non toxic    Assessment and Plan: "     (E11.9) Type 2 diabetes mellitus without complication, without long-term current use of insulin (H)  (primary encounter diagnosis)  Comment: we discussed adding a 3rd oral agent, but pt prefers to focus on diet. She agrees to meet with diab ed. She is also interested in the Freestyle [place].  She will cont to exercise.  Plan: AMBULATORY ADULT DIABETES EDUCATOR REFERRAL,         **A1C FUTURE 3mo        Return to clinic in 3 months.  Schedule annual eye exam as this is due.      Luz Maria Demarco PA-C

## 2020-01-31 ENCOUNTER — ALLIED HEALTH/NURSE VISIT (OUTPATIENT)
Dept: EDUCATION SERVICES | Facility: CLINIC | Age: 60
End: 2020-01-31
Payer: COMMERCIAL

## 2020-01-31 DIAGNOSIS — E11.9 TYPE 2 DIABETES MELLITUS WITHOUT COMPLICATION, WITHOUT LONG-TERM CURRENT USE OF INSULIN (H): Primary | ICD-10-CM

## 2020-01-31 PROCEDURE — G0108 DIAB MANAGE TRN  PER INDIV: HCPCS

## 2020-01-31 RX ORDER — FLASH GLUCOSE SENSOR
1 KIT MISCELLANEOUS
Qty: 2 EACH | Refills: 11 | Status: SHIPPED | OUTPATIENT
Start: 2020-01-31 | End: 2021-02-08

## 2020-01-31 RX ORDER — FLASH GLUCOSE SCANNING READER
1 EACH MISCELLANEOUS CONTINUOUS
Qty: 1 DEVICE | Refills: 0 | Status: SHIPPED | OUTPATIENT
Start: 2020-01-31 | End: 2021-08-26

## 2020-01-31 NOTE — PATIENT INSTRUCTIONS
Blood Glucose at the start of Exercise:    - Make sure your glucose is over 100 before starting exercise.    - If glucose is , consume 30-45 grams of carb prior to exercise   - If glucose is 124-180, consume 15 grams of carb.    - Be aware that anaerobic exercise can raise glucose level.      Bring blood glucose meter and logbook with you to all doctor and follow-up appointments.    Diabetes Education Telephone Visit Follow-up:    We realize your time is valuable and your health is important! We offer a convenient Telephone Visit follow up! It s a quick way to check in for a medication dose adjustment without having to come back to clinic as soon.    Telephone Visits are often covered by insurance. Please check with your insurance plan to see if this type of visit is covered. If not, the cost is less expensive than an office visit:      Up to 10 minutes (Code 39807): $30    11-20 minutes (Code 81469): $59    More than 20 minutes (Code 08710): $85    Talk with your Diabetes Educator if you want to learn more.      [facility]:  For Your Diabetes Education and Nutrition Appointments Call:  [phone]   For Diabetes Education or Nutrition Related Questions:   Phone: [phone]  E-mail: [email or web address]  Fax: [phone]   If you need a medication refill please contact your pharmacy. Please allow 3 business days for your refills to be completed.

## 2020-01-31 NOTE — PROGRESS NOTES
"  Diabetes Self-Management Education & Support    Diabetes Education Self Management & Training    SUBJECTIVE/OBJECTIVE:  Presents for: Individual review  Accompanied by: Self  Focus of Visit: CGM  Diabetes type: Type 2  Date of diagnosis: 20 years per pt  How confident are you filling out medical forms by yourself:: Not Assessed  Diabetes management related comments/concerns: interested in the personal shalonda.   Transportation concerns: No  Other concerns:: Glasses  Cultural Influences/Ethnic Background:  American    Diabetes Symptoms & Complications  Weight trend: Fluctuating minimally(up a few lbs since the summer)  Autonomic neuropathy: No  CVA: No  Heart disease: No  Nephropathy: No  Peripheral neuropathy: No  Retinopathy: No    Patient Problem List and Family Medical History reviewed for relevant medical history, current medical status, and diabetes risk factors.    Vitals:  There were no vitals taken for this visit.  Estimated body mass index is 26.93 kg/m  as calculated from the following:    Height as of 1/28/20: 1.588 m (5' 2.5\").    Weight as of 1/28/20: 67.9 kg (149 lb 9.6 oz).   Last 3 BP:   BP Readings from Last 3 Encounters:   01/28/20 137/79   06/18/19 110/68   11/14/18 116/74       History   Smoking Status     Never Smoker   Smokeless Tobacco     Never Used       Labs:  Lab Results   Component Value Date    A1C 7.7 01/24/2020     Lab Results   Component Value Date     01/24/2020     Lab Results   Component Value Date    LDL 77 01/24/2020     HDL Cholesterol   Date Value Ref Range Status   01/24/2020 59 >49 mg/dL Final   ]  GFR Estimate   Date Value Ref Range Status   01/24/2020 >90 >60 mL/min/[1.73_m2] Final     Comment:     Non  GFR Calc  Starting 12/18/2018, serum creatinine based estimated GFR (eGFR) will be   calculated using the Chronic Kidney Disease Epidemiology Collaboration   (CKD-EPI) equation.       GFR Estimate If Black   Date Value Ref Range Status   01/24/2020 >90 " >60 mL/min/[1.73_m2] Final     Comment:      GFR Calc  Starting 12/18/2018, serum creatinine based estimated GFR (eGFR) will be   calculated using the Chronic Kidney Disease Epidemiology Collaboration   (CKD-EPI) equation.       Lab Results   Component Value Date    CR 0.60 01/24/2020     No results found for: MICROALBUMIN    Healthy Eating  Healthy Eating Assessed Today: Yes  Cultural/Jew diet restrictions?: No  Bigger portions lately and less salads. Would benefit from closer review of this at next visit.     Being Active  Being Active Assessed Today: Yes  Exercise: Yes  Hikes a lot and did a 70 mile hike last summer. Is planning a 106 mile hike this summer in [place]. [place] and will be out by herself for 1 week at a time.     Monitoring  Monitoring Assessed Today: Yes  Did patient bring glucose meter to appointment? : No      Taking Medications  Diabetes Medication(s)     Biguanides       metFORMIN (GLUCOPHAGE) 1000 MG tablet    TAKE 1 TABLET BY MOUTH TWICE A DAY WITH MEALS    Sulfonylureas       glimepiride (AMARYL) 4 MG tablet    Take 2 tablets (8 mg) by mouth every morning (before breakfast)          Taking Medication Assessed Today: Yes  Problems taking diabetes medications regularly?: No  Diabetes medication side effects?: No    Problem Solving  Problem Solving Assessed Today: Yes  Hypoglycemia Frequency: Rarely  Feels low around the 90's.      Reducing Risks  Reducing Risks Assessed Today: No    Healthy Coping  Healthy Coping Assessed Today: Yes  Emotional response to diabetes: Ready to learn, Concern for health and well-being  Informal Support system:: Spouse  Stage of change: ACTION (Actively working towards change)  Patient Activation Measure Survey Score:  JUAN Score (Last Two) 10/5/2012   JUAN Raw Score 38   Activation Score 52.9   JUAN Level 2       ASSESSMENT:  Reports that she is so nervous of low blood sugars when hiking that she tends to eat too much.   Would benefit from a sensor to help see her BG trends and hopefully reduce over treating any potential or actual low blood sugars.     Per PCP note, would like to focus on diet and wearing a sensor prior to adding a 3rd agent to lower her BGs.  Also noted, she has been eating less salads and bigger portions lately.      Patient's most recent   Lab Results   Component Value Date    A1C 7.7 01/24/2020    is not meeting goal of <7.0    INTERVENTION:   Diabetes knowledge and skills assessment:     Patient is knowledgeable in diabetes management concepts related to: Healthy Eating, Being Active, Taking Medication, Reducing Risks and Healthy Coping    Patient needs further education on the following diabetes management concepts: Monitoring and Problem Solving    Based on learning assessment above, most appropriate setting for further diabetes education would be: Individual setting.    Education provided today on:  AADE Self-Care Behaviors:  Being Active: relationship to blood glucose. Discussed how much carb to eat for exercise based off her blood sugars when starting given her long hikes that she does. Discussed that it is very individual though and wearing a sensor will help her see how much carb she can eat and how long it usually lasts her.    Monitoring: log and interpret results, individual blood glucose targets and frequency of monitoring  Problem Solving: low blood glucose - causes, signs/symptoms, treatment and prevention, carrying a carbohydrate source at all times and when to call health care provider    CGM-specific education:   Freestyle Sanjuana sensor: insertion technique, sensor site location and rotation, insulin administration in relation to sensor placement, sensor wear, reasons to remove sensor (MRI, CT, diathermy), Vitamin C & Aspirin effects on sensor, Sanjuana Billings: frequency of scanning sensor, length of time data is visible, use of built in glucose meter, Precision X-tra test strips, Use of trends and  graphs for pattern management and problem solving, Dosing insulin based on sensor glucose results and LibreView software    Opportunities for ongoing education and support in diabetes-self management were discussed.    Pt verbalized understanding of concepts discussed and recommendations provided today.       Education Materials Provided:  Estimated A1c handout, Sanjuana pamphlet    PLAN:  See Patient Instructions for co-developed, patient-stated behavior change goals.  Personal freestyle sanjuana sensors ordered per referral from PCP. Provided Rubi with a coupon for a month of free sensors and reader and instructed her to have pharmacy call sanjuana customer support if they are not covered for refills to get a code to make the price no more than $75 per month.   If BG's continue to be elevated, would recommend a third diabetes medication. Would recommend a GLP1 or SGLT2 medication as these do not increase her risk of low blood sugars and some have proven CVD benefit.   Follow up scheduled in 1 month to review her sanjuana data and will plan to follow up on her diet.   AVS printed and provided to patient today. See Follow-Up section for recommended follow-up.    PATEL Gould CDE    Time Spent: 45 minutes  Encounter Type: Individual    Any diabetes medication dose changes were made via the CDE Protocol and Collaborative Practice Agreement with the patient's referring provider. A copy of this encounter was shared with the provider.

## 2020-01-31 NOTE — Clinical Note
MAHENDRA, saw Rubi today and ordered the freestyle shalonda for her. Also, gave her a coupon for a free month supply. Will see her again in 1 month to review the shalonda data and focus more on her diet. Thanks for the referral!Willow Gilbert RD LD CDE

## 2020-02-07 ENCOUNTER — TELEPHONE (OUTPATIENT)
Dept: FAMILY MEDICINE | Facility: CLINIC | Age: 60
End: 2020-02-07

## 2020-02-07 NOTE — TELEPHONE ENCOUNTER
Panel Management Review      Patient has the following on her problem list:     Diabetes Eye Exam        Summary:    Patient is due/failing the following:   Diabetes Eye exam      Type of outreach:    Sent letter.                                                                                                                                     Keira Peralta MA

## 2020-02-07 NOTE — LETTER
[facility]  [address] [facility] [address]  [phone]        February 7, 2020  Rubi Nicolas  [address] [address]    Dear Rubi,    I care about your health and have reviewed your health plan. I have reviewed your medical conditions, medication list, and lab results and am making recommendations based on this review, to better manage your health.    You are in particular need of attention regarding:  -Diabetes Eye Exam    I am recommending that you:  Schedule a Diabetic Eye Exam            Thank you for trusting [facility] and we appreciate the opportunity to serve you.  We look forward to supporting your healthcare needs in the future.    Healthy Regards,    Luz Maria Demarco PA-C

## 2020-02-11 DIAGNOSIS — E11.9 TYPE 2 DIABETES MELLITUS WITHOUT COMPLICATION, WITHOUT LONG-TERM CURRENT USE OF INSULIN (H): ICD-10-CM

## 2020-02-12 RX ORDER — GLIMEPIRIDE 4 MG/1
8 TABLET ORAL
Qty: 180 TABLET | Refills: 1 | Status: SHIPPED | OUTPATIENT
Start: 2020-02-12 | End: 2020-03-06

## 2020-02-12 NOTE — TELEPHONE ENCOUNTER
metFORMIN (GLUCOPHAGE) 1000 MG tablet      Last Written Prescription Date:  6/18/2019  Last Fill Quantity: 180,  # refills: 1   Last office visit: 1/28/2020 with prescribing provider:  Luz Maria Galindo Office Visit:  Unknown     glimepiride (AMARYL) 4 MG tablet    Last Written Prescription Date:  11/14/2018  Last Fill Quantity: 180,  # refills: 3   Last office visit: 1/28/2020 with prescribing provider:  Luz Maria Galindo Office Visit:  Unknown     Requested Prescriptions   Pending Prescriptions Disp Refills     metFORMIN (GLUCOPHAGE) 1000 MG tablet [Pharmacy Med Name: METFORMIN HCL 1,000 MG TABLET] 180 tablet 1     Sig: TAKE 1 TABLET BY MOUTH TWICE A DAY WITH MEALS       Biguanide Agents Passed - 2/11/2020  6:41 PM        Passed - Blood pressure less than 140/90 in past 6 months     BP Readings from Last 3 Encounters:   01/28/20 137/79   06/18/19 110/68   11/14/18 116/74                 Passed - Patient has documented LDL within the past 12 mos.     Recent Labs   Lab Test 01/24/20  0829   LDL 77             Passed - Patient has had a Microalbumin in the past 15 mos.     Recent Labs   Lab Test 01/24/20  0834   MICROL 14   UMALCR 19.20             Passed - Patient is age 10 or older        Passed - Patient has documented A1c within the specified period of time.     If HgbA1C is 8 or greater, it needs to be on file within the past 3 months.  If less than 8, must be on file within the past 6 months.     Recent Labs   Lab Test 01/24/20  0829   A1C 7.7*             Passed - Patient's CR is NOT>1.4 OR Patient's EGFR is NOT<45 within past 12 mos.     Recent Labs   Lab Test 01/24/20  0829   GFRESTIMATED >90   GFRESTBLACK >90       Recent Labs   Lab Test 01/24/20  0829   CR 0.60             Passed - Patient does NOT have a diagnosis of CHF.        Passed - Medication is active on med list        Passed - Patient is not pregnant        Passed - Patient has not had a positive pregnancy test within the past 12 mos.          "Passed - Recent (6 mo) or future (30 days) visit within the authorizing provider's specialty     Patient had office visit in the last 6 months or has a visit in the next 30 days with authorizing provider or within the authorizing provider's specialty.  See \"Patient Info\" tab in inbasket, or \"Choose Columns\" in Meds & Orders section of the refill encounter.            glimepiride (AMARYL) 4 MG tablet [Pharmacy Med Name: GLIMEPIRIDE 4 MG TABLET] 180 tablet 3     Sig: TAKE 2 TABLETS (8 MG) BY MOUTH EVERY MORNING (BEFORE BREAKFAST)       Sulfonylurea Agents Passed - 2/11/2020  6:41 PM        Passed - Blood pressure less than 140/90 in past 6 months     BP Readings from Last 3 Encounters:   01/28/20 137/79   06/18/19 110/68   11/14/18 116/74                 Passed - Patient has documented LDL within the past 12 mos.     Recent Labs   Lab Test 01/24/20  0829   LDL 77             Passed - Patient has had a Microalbumin in the past 15 mos.     Recent Labs   Lab Test 01/24/20  0834   MICROL 14   UMALCR 19.20             Passed - Patient has documented A1c within the specified period of time.     If HgbA1C is 8 or greater, it needs to be on file within the past 3 months.  If less than 8, must be on file within the past 6 months.     Recent Labs   Lab Test 01/24/20  0829   A1C 7.7*             Passed - Medication is active on med list        Passed - Patient is age 18 or older        Passed - No active pregnancy on record        Passed - Patient has a recent creatinine (normal) within the past 12 mos.     Recent Labs   Lab Test 01/24/20  0829   CR 0.60             Passed - Patient has not had a positive pregnancy test within the past 12 mos.        Passed - Recent (6 mo) or future (30 days) visit within the authorizing provider's specialty     Patient had office visit in the last 6 months or has a visit in the next 30 days with authorizing provider or within the authorizing provider's specialty.  See \"Patient Info\" tab in " "inbasket, or \"Choose Columns\" in Meds & Orders section of the refill encounter.              "

## 2020-02-21 ENCOUNTER — TRANSFERRED RECORDS (OUTPATIENT)
Dept: MULTI SPECIALTY CLINIC | Facility: CLINIC | Age: 60
End: 2020-02-21

## 2020-02-21 LAB — RETINOPATHY: NEGATIVE

## 2020-03-06 ENCOUNTER — ALLIED HEALTH/NURSE VISIT (OUTPATIENT)
Dept: EDUCATION SERVICES | Facility: CLINIC | Age: 60
End: 2020-03-06
Payer: COMMERCIAL

## 2020-03-06 DIAGNOSIS — E11.9 TYPE 2 DIABETES MELLITUS WITHOUT COMPLICATION, WITHOUT LONG-TERM CURRENT USE OF INSULIN (H): ICD-10-CM

## 2020-03-06 PROCEDURE — G0108 DIAB MANAGE TRN  PER INDIV: HCPCS

## 2020-03-06 RX ORDER — GLIMEPIRIDE 2 MG/1
6 TABLET ORAL
Qty: 270 TABLET | Refills: 4 | Status: SHIPPED | OUTPATIENT
Start: 2020-03-06 | End: 2021-04-07

## 2020-03-06 NOTE — PROGRESS NOTES
"  Diabetes Self-Management Education & Support    Presents for: CGM Review    SUBJECTIVE/OBJECTIVE:  Presents for: CGM Review  Accompanied by: Self  Diabetes education in the past 24mo: Yes  Focus of Visit: CGM  Diabetes type: Type 2  Date of diagnosis: 20 years per pt  Disease course: Improving  How confident are you filling out medical forms by yourself:: Not Assessed  Diabetes management related comments/concerns: Loves her new shalonda sensors.  $30 per sensor which is a bit much but she finds them worth it.  Transportation concerns: No  Difficulty affording diabetes medication?: No  Difficulty affording diabetes testing supplies?: No  Other concerns:: Glasses  Cultural Influences/Ethnic Background:  American    Diabetes Symptoms & Complications:  Weight trend: Stable(might be down a few pounds per pt since January)  Complications assessed today?: Yes  Autonomic neuropathy: No  CVA: No  Heart disease: No  Nephropathy: No  Peripheral neuropathy: No  Retinopathy: No    Patient Problem List and Family Medical History reviewed for relevant medical history, current medical status, and diabetes risk factors.    Vitals:  There were no vitals taken for this visit.  Estimated body mass index is 26.93 kg/m  as calculated from the following:    Height as of 1/28/20: 1.588 m (5' 2.5\").    Weight as of 1/28/20: 67.9 kg (149 lb 9.6 oz).   Last 3 BP:   BP Readings from Last 3 Encounters:   01/28/20 137/79   06/18/19 110/68   11/14/18 116/74       History   Smoking Status     Never Smoker   Smokeless Tobacco     Never Used       Labs:  Lab Results   Component Value Date    A1C 7.7 01/24/2020     Lab Results   Component Value Date     01/24/2020     Lab Results   Component Value Date    LDL 77 01/24/2020     HDL Cholesterol   Date Value Ref Range Status   01/24/2020 59 >49 mg/dL Final   ]  GFR Estimate   Date Value Ref Range Status   01/24/2020 >90 >60 mL/min/[1.73_m2] Final     Comment:     Non  GFR " Calc  Starting 12/18/2018, serum creatinine based estimated GFR (eGFR) will be   calculated using the Chronic Kidney Disease Epidemiology Collaboration   (CKD-EPI) equation.       GFR Estimate If Black   Date Value Ref Range Status   01/24/2020 >90 >60 mL/min/[1.73_m2] Final     Comment:      GFR Calc  Starting 12/18/2018, serum creatinine based estimated GFR (eGFR) will be   calculated using the Chronic Kidney Disease Epidemiology Collaboration   (CKD-EPI) equation.       Lab Results   Component Value Date    CR 0.60 01/24/2020     No results found for: MICROALBUMIN    Healthy Eating:  Healthy Eating Assessed Today: Yes  Cultural/Mandaeism diet restrictions?: No  Meal planning/habits: Avoiding sweets  Meals include: Breakfast, Dinner, Lunch, Evening Snack  Breakfast: almond flour pancakes this am - usually low carb and just some black coffee  Snacks: low carb WW toast with peanut butter and sometimes 1/2 granola bar overnight to bring her BG up  Beverages: Water, Coffee    Being Active:  Being Active Assessed Today: Yes  Exercise:: Yes    Monitoring:  Monitoring Assessed Today: Yes  Did patient bring glucose meter to appointment? : No  Blood Glucose Meter: CGM    Taking Medications:  Diabetes Medication(s)     Biguanides       metFORMIN (GLUCOPHAGE) 1000 MG tablet    TAKE 1 TABLET BY MOUTH TWICE A DAY WITH MEALS    Sulfonylureas       glimepiride (AMARYL) 4 MG tablet    TAKE 2 TABLETS (8 MG) BY MOUTH EVERY MORNING (BEFORE BREAKFAST)          Taking Medication Assessed Today: Yes  Current Treatments: Oral Medication (taken by mouth)  Problems taking diabetes medications regularly?: No  Diabetes medication side effects?: No    Problem Solving:  Problem Solving Assessed Today: Yes  Is the patient at risk for hypoglycemia?: Yes  Hypoglycemia Frequency: Rarely  Hypoglycemia Treatment: Other food  Is the patient at risk for DKA?: No    Reducing Risks:  Reducing Risks Assessed Today: No    Healthy  "Coping:  Healthy Coping Assessed Today: Yes  Emotional response to diabetes: Ready to learn, Concern for health and well-being  Informal Support system:: Spouse  Stage of change: ACTION (Actively working towards change)  Patient Activation Measure Survey Score:  JUAN Score (Last Two) 10/5/2012   JUAN Raw Score 38   Activation Score 52.9   JUAN Level 2       Diabetes knowledge and skills assessment:   Patient is knowledgeable in diabetes management concepts related to: Being Active, Taking Medication, Problem Solving, Reducing Risks and Healthy Coping  Patient needs further education on the following diabetes management concepts: Healthy Eating and Monitoring  Based on learning assessment above, most appropriate setting for further diabetes education would be: Individual setting.      INTERVENTIONS:    REPORTS:                Education provided today on:  AADE Self-Care Behaviors:  Healthy Eating: Discussed trying to have a little carb with some protein/fat for breakfast to see if this helps with her BG rise in the morning. Showed her some recipe ideas using eatingwell.com website. Praised her on her diet changes since our last visit.   Monitoring: log and interpret results and individual blood glucose targets. Reviewed her shalonda report with her and the results. Praised her on the improvement in her BGs.   Taking Medication: Educated that with her lows overnight and also her need to have snacks most days before bed to prevent lows or have a snack overnight if she is in the 70's that we should lower her glimepiride a little.     Pt verbalized understanding of concepts discussed and recommendations provided today.       Education Materials Provided:  No new materials provided today    ASSESSMENT:  Loves the sensor. Reports she has learned \"so much.\"  Used to check BG's just in the morning and then would eat breakfast and thinks she was just high then all day as a result.  Is wondering what to do to help the paola effect. " Explained that we can't really get rid of paola effect but I wonder if she ate a little carb with some protein if that would help her body not make so much sugar in the morning. She will try out a few breakfast ideas to see if it helps.     Overall, her time in range is 97% which is wonderful. Praised her on this and showed her the estimated A1c/GMI of 6.3%. Explained that even with her paola effect she is still mostly in target.      Glucose Patterns & Trends:  nocturnal hypoglycemia      PLAN  See Patient Instructions for co-developed, patient-stated behavior change goals.  Continue use of shalonda sensors.   Try adding 15 grams of whole grain carb with protein/heart healthy fat at breakfast to see if this helps with her BG rise in the morning.   Reduce glimepiride to 6 mg/d. Will change order to 2 mg tablets.   AVS printed and provided to patient today. See Follow-Up section for recommended follow-up.    PATEL Gould CDE    Time Spent: 45 minutes  Encounter Type: Individual    Any diabetes medication dose changes were made via the CDE Protocol and Collaborative Practice Agreement with the patient's referring provider. A copy of this encounter was shared with the provider.

## 2020-03-06 NOTE — PATIENT INSTRUCTIONS
Reduce Glimepiride to 6 mg per day.     Let's try 15 grams of carb at breakfast (whole grain with some protein).     [email or web address] for some recipe ideas    Bring blood glucose meter and logbook with you to all doctor and follow-up appointments.    Diabetes Education Telephone Visit Follow-up:    We realize your time is valuable and your health is important! We offer a convenient Telephone Visit follow up! It s a quick way to check in for a medication dose adjustment without having to come back to clinic as soon.    Telephone Visits are often covered by insurance. Please check with your insurance plan to see if this type of visit is covered. If not, the cost is less expensive than an office visit:      Up to 10 minutes (Code 42014): $30    11-20 minutes (Code 97722): $59    More than 20 minutes (Code 79088): $85    Talk with your Diabetes Educator if you want to learn more.      [facility]:  For Your Diabetes Education and Nutrition Appointments Call:  [phone]   For Diabetes Education or Nutrition Related Questions:   Phone: [phone]  E-mail: [email or web address]  Fax: [phone]   If you need a medication refill please contact your pharmacy. Please allow 3 business days for your refills to be completed.

## 2020-03-06 NOTE — Clinical Note
MAHENDRA, changed her glimepiride from 8 to 6 mg/day (changed the order to 2 mg tablets) as she has some overnight lows (3 in 2 weeks) and has to routinely eat a snack before bed and overnight to avoid lows.  Her overall time in range is 97% which is fantastic! She loves the sensors and finds them so helpful in learning how eating and exercise affects her BGs!  Let me know if you have questions! Will see her again in a month to see how the lower dose of glimepiride is going. Thanks!Willow Gilbert, PATEL LD CDE

## 2020-03-19 DIAGNOSIS — I10 ESSENTIAL HYPERTENSION WITH GOAL BLOOD PRESSURE LESS THAN 140/90: ICD-10-CM

## 2020-03-19 RX ORDER — LISINOPRIL 10 MG/1
TABLET ORAL
Qty: 45 TABLET | Refills: 0 | Status: SHIPPED | OUTPATIENT
Start: 2020-03-19 | End: 2020-06-11

## 2020-03-19 NOTE — TELEPHONE ENCOUNTER
"Last Written Prescription Date:  11/14/18  Last Fill Quantity: 45 tablet,  # refills: 3   Last office visit: 1/28/2020 with prescribing provider:  Nilam   Future Office Visit:      Requested Prescriptions   Pending Prescriptions Disp Refills     lisinopril (ZESTRIL) 10 MG tablet [Pharmacy Med Name: LISINOPRIL 10 MG TABLET] 45 tablet 3     Sig: TAKE ONE-HALF TABLET BY MOUTH DAILY       ACE Inhibitors (Including Combos) Protocol Passed - 3/19/2020  4:21 AM        Passed - Blood pressure under 140/90 in past 12 months     BP Readings from Last 3 Encounters:   01/28/20 137/79   06/18/19 110/68   11/14/18 116/74                 Passed - Recent (12 mo) or future (30 days) visit within the authorizing provider's specialty     Patient has had an office visit with the authorizing provider or a provider within the authorizing providers department within the previous 12 mos or has a future within next 30 days. See \"Patient Info\" tab in inbasket, or \"Choose Columns\" in Meds & Orders section of the refill encounter.              Passed - Medication is active on med list        Passed - Patient is age 18 or older        Passed - No active pregnancy on record        Passed - Normal serum creatinine on file in past 12 months     Recent Labs   Lab Test 01/24/20  0829   CR 0.60       Ok to refill medication if creatinine is low          Passed - Normal serum potassium on file in past 12 months     Recent Labs   Lab Test 01/24/20  0829   POTASSIUM 4.2             Passed - No positive pregnancy test within past 12 months             "

## 2020-03-19 NOTE — TELEPHONE ENCOUNTER
Routing refill request to provider for review/approval because:  Break in medication.  Last rx would have been completed  11/2019  Please authorize if appropriate.  Thanks,  Cherise Vega RN

## 2020-04-03 ENCOUNTER — ALLIED HEALTH/NURSE VISIT (OUTPATIENT)
Dept: EDUCATION SERVICES | Facility: CLINIC | Age: 60
End: 2020-04-03
Payer: COMMERCIAL

## 2020-04-03 DIAGNOSIS — E11.9 TYPE 2 DIABETES MELLITUS WITHOUT COMPLICATION, WITHOUT LONG-TERM CURRENT USE OF INSULIN (H): Primary | ICD-10-CM

## 2020-04-03 PROCEDURE — G0108 DIAB MANAGE TRN  PER INDIV: HCPCS

## 2020-04-03 NOTE — PROGRESS NOTES
"  Diabetes Self-Management Education & Support    Presents for: Follow-up over the phone    Patient verbally consented to the telephone visit service today: yes      SUBJECTIVE/OBJECTIVE:  Presents for: Follow-up  Diabetes education in the past 24mo: Yes  Focus of Visit: CGM  Diabetes type: Type 2  Date of diagnosis: 20 years per pt  Disease course: Improving  How confident are you filling out medical forms by yourself:: Not Assessed  Diabetes management related comments/concerns: Feels like she is learning still. Before bed her numbers can still be kind of low.  Transportation concerns: No  Difficulty affording diabetes medication?: No  Difficulty affording diabetes testing supplies?: No  Other concerns:: Glasses  Cultural Influences/Ethnic Background:  American    Diabetes Symptoms & Complications:  Weight trend: Stable(might be down a few pounds per pt since January)  Complications assessed today?: Yes  Autonomic neuropathy: No  CVA: No  Heart disease: No  Nephropathy: No  Peripheral neuropathy: No  Retinopathy: No    Patient Problem List and Family Medical History reviewed for relevant medical history, current medical status, and diabetes risk factors.    Vitals:  There were no vitals taken for this visit.  Estimated body mass index is 26.93 kg/m  as calculated from the following:    Height as of 1/28/20: 1.588 m (5' 2.5\").    Weight as of 1/28/20: 67.9 kg (149 lb 9.6 oz).   Last 3 BP:   BP Readings from Last 3 Encounters:   01/28/20 137/79   06/18/19 110/68   11/14/18 116/74       History   Smoking Status     Never Smoker   Smokeless Tobacco     Never Used       Labs:  Lab Results   Component Value Date    A1C 7.7 01/24/2020     Lab Results   Component Value Date     01/24/2020     Lab Results   Component Value Date    LDL 77 01/24/2020     HDL Cholesterol   Date Value Ref Range Status   01/24/2020 59 >49 mg/dL Final   ]  GFR Estimate   Date Value Ref Range Status   01/24/2020 >90 >60 mL/min/[1.73_m2] " Final     Comment:     Non  GFR Calc  Starting 12/18/2018, serum creatinine based estimated GFR (eGFR) will be   calculated using the Chronic Kidney Disease Epidemiology Collaboration   (CKD-EPI) equation.       GFR Estimate If Black   Date Value Ref Range Status   01/24/2020 >90 >60 mL/min/[1.73_m2] Final     Comment:      GFR Calc  Starting 12/18/2018, serum creatinine based estimated GFR (eGFR) will be   calculated using the Chronic Kidney Disease Epidemiology Collaboration   (CKD-EPI) equation.       Lab Results   Component Value Date    CR 0.60 01/24/2020     No results found for: MICROALBUMIN    Healthy Eating:  Healthy Eating Assessed Today: Yes  Cultural/Buddhism diet restrictions?: No  Meal planning/habits: Avoiding sweets  Meals include: Breakfast, Dinner, Lunch, Evening Snack  Breakfast: almond flour pancakes this am - usually low carb and just some black coffee  Dinner: low carb dinner  Snacks: low carb WW toast with peanut butter and sometimes 1/2 granola bar overnight to bring her BG up  Beverages: Water, Coffee  Has a piece of bread with PB before bed. Then doesn't feel like her BG goes up too quickly so might have a fruit and then they go up too high and are high overnight.     Being Active:  Being Active Assessed Today: Yes  Exercise:: Yes  How intense was your typical exercise? : Moderate (like brisk walking)    Monitoring:  Monitoring Assessed Today: Yes  Did patient bring glucose meter to appointment? : No  Blood Glucose Meter: CGM    Time in target past 7 days:    target that she set: 70% the past 7 days  > 148 23% of the time  < 90 7% of the time    10 pm 74 (1 sandwich, 1 tangerine, 1 fig ellis)  then at 11:30 pm it was 80. Midnight it was 97. 1 am 119. When she woke up this am at 7:30 am it was 169.   120's in the afternoon after her walk and then 90's usually before dinner. Dinner is then pretty low carb.      Taking Medications:  Diabetes  Medication(s)     Biguanides       metFORMIN (GLUCOPHAGE) 1000 MG tablet    TAKE 1 TABLET BY MOUTH TWICE A DAY WITH MEALS    Sulfonylureas       glimepiride (AMARYL) 2 MG tablet    Take 3 tablets (6 mg) by mouth every morning (before breakfast)          Taking Medication Assessed Today: Yes  Current Treatments: Oral Medication (taken by mouth)  Problems taking diabetes medications regularly?: No  Diabetes medication side effects?: No    Problem Solving:  Problem Solving Assessed Today: Yes  Is the patient at risk for hypoglycemia?: Yes  Hypoglycemia Frequency: Rarely  Hypoglycemia Treatment: Other food  Is the patient at risk for DKA?: No    Reducing Risks:  Reducing Risks Assessed Today: No    Healthy Coping:  Healthy Coping Assessed Today: Yes  Emotional response to diabetes: Ready to learn, Concern for health and well-being  Informal Support system:: Spouse  Stage of change: ACTION (Actively working towards change)  Patient Activation Measure Survey Score:  JUAN Score (Last Two) 10/5/2012   JUAN Raw Score 38   Activation Score 52.9   JUAN Level 2       Diabetes knowledge and skills assessment:   Patient is knowledgeable in diabetes management concepts related to: Being Active, Taking Medication, Problem Solving, Reducing Risks and Healthy Coping  Patient needs further education on the following diabetes management concepts: Healthy Eating and Monitoring  Based on learning assessment above, most appropriate setting for further diabetes education would be: Group class or Individual setting.      INTERVENTIONS:    Education provided today on:  AADE Self-Care Behaviors:  Healthy Eating: Discussed trying to have some carb intake at dinner to see if this helps reduce the need for a bedtime snack.   Being Active: relationship to blood glucose and explained why her blood sugars are better after she does exercise (even when she eats).   Monitoring: log and interpret results and individual blood glucose targets  Taking  Medication: discussed trying a lower dose of 4 mg/d of her glimepiride if she continues to need a bedtime snack (and if she would rather not need this if possible).  Problem solving: Sick day guidelines    Opportunities for ongoing education and support in diabetes-self management were discussed.    Pt verbalized understanding of concepts discussed and recommendations provided today.       Education Materials Provided:  No new materials provided today      ASSESSMENT:  Overall, her blood sugars continue to be in target mostly. Praised her on this. She continues to have some paola effect with her morning numbers but then exercises (walks or hikes or bikes inside) to bring her BG back down. Reports her exercise usually brings her down pretty quick to about 100 mg/dL.      She has been needing a bedtime snack still and finds she needs both a sandwich and some fruit to bring her BG back up.      Might benefit from a lower dose of glimepiride but will first try to have some carb at dinner first to see if that helps (currently her dinner is very low carb).     Patient's most recent   Lab Results   Component Value Date    A1C 7.7 01/24/2020    is not meeting goal of <7.0    PLAN  See Patient Instructions for co-developed, patient-stated behavior change goals.  Try adding some carb at dinner to see if a bedtime snack is still needed.   Follow up scheduled in 1 month.   Continue glimepiride 6 mg/day. If continuing to have to have a snack at bedtime, consider lowering dose to 4 mg/day.   AVS printed and provided to patient today. See Follow-Up section for recommended follow-up.    PATEL Gould CDE    Time Spent: 30 minutes over the phone  Encounter Type: Individual    Any diabetes medication dose changes were made via the CDE Protocol and Collaborative Practice Agreement with the patient's referring provider. A copy of this encounter was shared with the provider.

## 2020-04-03 NOTE — Clinical Note
FYI, she is doing very well and still using her shalonda sensors with great time in range. Told her she could try lowering her glimepiride a bit more from 6 to 4 mg/d if she continues to require bedtime snacks to raise her BG before bed.  Will first try adding a little carb to her dinner though (does not usually have any carbs at dinner). Will follow up again in 1 month. Let me know if you have questions!  Willow Gilbert, PATEL LD CDE

## 2020-04-28 DIAGNOSIS — E11.9 TYPE 2 DIABETES MELLITUS WITHOUT COMPLICATION, WITHOUT LONG-TERM CURRENT USE OF INSULIN (H): ICD-10-CM

## 2020-04-28 LAB — HBA1C MFR BLD: 6.4 % (ref 0–5.6)

## 2020-04-28 PROCEDURE — 36415 COLL VENOUS BLD VENIPUNCTURE: CPT | Performed by: PHYSICIAN ASSISTANT

## 2020-04-28 PROCEDURE — 83036 HEMOGLOBIN GLYCOSYLATED A1C: CPT | Performed by: PHYSICIAN ASSISTANT

## 2020-04-28 NOTE — RESULT ENCOUNTER NOTE
Rubi,      Great news!  Your hemoglobin A1c went from 7.7% to 6.4% so good job!    Luz Maria Demarco PA-C

## 2020-05-08 ENCOUNTER — ALLIED HEALTH/NURSE VISIT (OUTPATIENT)
Dept: EDUCATION SERVICES | Facility: CLINIC | Age: 60
End: 2020-05-08
Payer: COMMERCIAL

## 2020-05-08 DIAGNOSIS — E11.9 TYPE 2 DIABETES MELLITUS WITHOUT COMPLICATION, WITHOUT LONG-TERM CURRENT USE OF INSULIN (H): Primary | ICD-10-CM

## 2020-05-08 PROCEDURE — 98968 PH1 ASSMT&MGMT NQHP 21-30: CPT

## 2020-05-08 NOTE — PROGRESS NOTES
"      Diabetes Self-Management Education & Support    Presents for: Follow-up over the phone    Patient verbally consented to the telephone visit service today: yes        SUBJECTIVE/OBJECTIVE:  Presents for: Follow-up  Diabetes education in the past 24mo: Yes  Focus of Visit: CGM  Diabetes type: Type 2  Date of diagnosis: 20 years per pt  Disease course: Improving  How confident are you filling out medical forms by yourself:: Not Assessed  Diabetes management related comments/concerns: Still having some lower bedtime numbers. Does not necessarily mind because then she can have a little treat sometimes. Is wondering if there is anything to do to help her morning numbers.  Transportation concerns: No  Difficulty affording diabetes medication?: No  Difficulty affording diabetes testing supplies?: No  Other concerns:: Glasses  Cultural Influences/Ethnic Background:  American    Diabetes Symptoms & Complications:  Weight trend: Stable(might be down a few pounds per pt since January)  Complications assessed today?: Yes  Autonomic neuropathy: No  CVA: No  Heart disease: No  Nephropathy: No  Peripheral neuropathy: No  Retinopathy: No    Patient Problem List and Family Medical History reviewed for relevant medical history, current medical status, and diabetes risk factors.    Vitals:  There were no vitals taken for this visit.  Estimated body mass index is 26.93 kg/m  as calculated from the following:    Height as of 1/28/20: 1.588 m (5' 2.5\").    Weight as of 1/28/20: 67.9 kg (149 lb 9.6 oz).   Last 3 BP:   BP Readings from Last 3 Encounters:   01/28/20 137/79   06/18/19 110/68   11/14/18 116/74       History   Smoking Status     Never Smoker   Smokeless Tobacco     Never Used       Labs:  Lab Results   Component Value Date    A1C 6.4 04/28/2020     Lab Results   Component Value Date     01/24/2020     Lab Results   Component Value Date    LDL 77 01/24/2020     HDL Cholesterol   Date Value Ref Range Status "   01/24/2020 59 >49 mg/dL Final   ]  GFR Estimate   Date Value Ref Range Status   01/24/2020 >90 >60 mL/min/[1.73_m2] Final     Comment:     Non  GFR Calc  Starting 12/18/2018, serum creatinine based estimated GFR (eGFR) will be   calculated using the Chronic Kidney Disease Epidemiology Collaboration   (CKD-EPI) equation.       GFR Estimate If Black   Date Value Ref Range Status   01/24/2020 >90 >60 mL/min/[1.73_m2] Final     Comment:      GFR Calc  Starting 12/18/2018, serum creatinine based estimated GFR (eGFR) will be   calculated using the Chronic Kidney Disease Epidemiology Collaboration   (CKD-EPI) equation.       Lab Results   Component Value Date    CR 0.60 01/24/2020     No results found for: MICROALBUMIN    Healthy Eating:  Healthy Eating Assessed Today: Yes  Cultural/Episcopalian diet restrictions?: No  Meal planning/habits: Avoiding sweets  Meals include: Breakfast, Dinner, Lunch, Evening Snack  Breakfast: almond flour pancakes this am - usually low carb and just some black coffee  Dinner: low carb dinner  Snacks: low carb WW toast with peanut butter and sometimes 1/2 granola bar overnight to bring her BG up  Beverages: Water, Coffee    Being Active:  Being Active Assessed Today: Yes  Exercise:: Yes  How intense was your typical exercise? : Moderate (like brisk walking)  Barrier to exercise: None    Monitoring:  Monitoring Assessed Today: Yes  Did patient bring glucose meter to appointment? : No  Blood Glucose Meter: CGM  Blood glucose trend: No change    Time in range of  - 96% in the past 14 and 30 days.     Taking Medications:  Diabetes Medication(s)     Biguanides       metFORMIN (GLUCOPHAGE) 1000 MG tablet    TAKE 1 TABLET BY MOUTH TWICE A DAY WITH MEALS    Sulfonylureas       glimepiride (AMARYL) 2 MG tablet    Take 3 tablets (6 mg) by mouth every morning (before breakfast)          Taking Medication Assessed Today: Yes  Current Treatments: Oral Medication (taken  by mouth)  Problems taking diabetes medications regularly?: No  Diabetes medication side effects?: No    Problem Solving:  Problem Solving Assessed Today: Yes  Is the patient at risk for hypoglycemia?: Yes  Hypoglycemia Frequency: Rarely  Hypoglycemia Treatment: Other food  Is the patient at risk for DKA?: No    Reducing Risks:  Reducing Risks Assessed Today: Yes  Diabetes Risks: Age over 45 years, Hyperlipidemia  CAD Risks: Diabetes Mellitus, Dyslipidemia    Healthy Coping:  Healthy Coping Assessed Today: Yes  Emotional response to diabetes: Ready to learn, Concern for health and well-being  Informal Support system:: Spouse  Stage of change: ACTION (Actively working towards change)  Patient Activation Measure Survey Score:  JUAN Score (Last Two) 10/5/2012   JUAN Raw Score 38   Activation Score 52.9   JUAN Level 2       Diabetes knowledge and skills assessment:   Patient is knowledgeable in diabetes management concepts related to: Healthy Eating, Being Active, Taking Medication, Problem Solving, Reducing Risks and Healthy Coping  Patient needs further education on the following diabetes management concepts: Monitoring  Based on learning assessment above, most appropriate setting for further diabetes education would be: Individual setting.      INTERVENTIONS:    Education provided today on:  AADE Self-Care Behaviors:  Diabetes Pathophysiology  Monitoring: log and interpret results and individual blood glucose targets. Praised her on her A1c improvement. She would like to see it lower. Explained that we don't want it TOO low because then there is a higher risk again (much like if it is too high) for complications from diabetes and if it is too low then I would be concerned about too many lows or severe low blood sugars. She appreciated this and feels more satisfied with her result now.   Taking Medication: action of prescribed medication and side effects of prescribed medications. Explained that if she continues to have  lows in the evening I would suggest lowering her glimepiride.     Opportunities for ongoing education and support in diabetes-self management were discussed.    Pt verbalized understanding of concepts discussed and recommendations provided today.       Education Materials Provided:  No new materials provided today      ASSESSMENT:  Is more active in the evening with the warmer weather.     Diet has been changing with the warmer weather and longer days and she has noticed some continued lower numbers in the evening.   More evening lows lately but feels it is unpredictable. Suggested we lower her glimepiride to help address her lows but she likes that she can have a little snack in the evening then. Would like to see how it goes another couple weeks. No severe lows noted.    This morning she was 146 mg/dL. Cup of coffee. Now 173 mg/dL.  She was wondering if she can do anything different to help her morning numbers. Explained that she might be having some rebound hyperglycemia so if we lower her glimepiride this might actually help her higher numbers in the morning.  Would recommend she be mindful of when her lows happen and how her BG is the next morning and if it is affected.       Patient's most recent   Lab Results   Component Value Date    A1C 6.4 04/28/2020    is meeting goal of <7.0 --> improved from 7.7% in [place]    PLAN  See Patient Instructions for co-developed, patient-stated behavior change goals.  Follow up in 2 months scheduled.   Rubi will reach out if she continues to have lows so we can reduce her glimepiride.   Continue use of the Sanjuana sensor.   Rubi will be mindful of her lows in the evening and if this makes her morning numbers higher then usual (rebound hyperglycemia).   AVS printed and provided to patient today. See Follow-Up section for recommended follow-up.    PATEL Gould CDE    Time Spent: 23:52 minutes  Encounter Type: Individual    Any diabetes medication dose changes were made via  the CDE Protocol and Collaborative Practice Agreement with the patient's referring provider. A copy of this encounter was shared with the provider.

## 2020-05-08 NOTE — Clinical Note
FYI, yay her A1c looks great! Her time in range of 80 to 180 is 96% too which is great. Had suggested we lower her glimepiride just a tad to 4 mg but she would like to hold off on this and see how the next 2 months go which I think is fair. She uses her shalonda sensor still and is very on top of her numbers. Will call her again in 2 months.   Willow Gilbert RD LD CDE

## 2020-06-04 ENCOUNTER — TELEPHONE (OUTPATIENT)
Dept: FAMILY MEDICINE | Facility: CLINIC | Age: 60
End: 2020-06-04

## 2020-06-04 NOTE — TELEPHONE ENCOUNTER
Please abstract the following data from this visit with this patient into the appropriate field in Epic:    Tests that can be patient reported without a hard copy:    Eye exam with ophthalmology on this date: 2- @ [facility]    Keira Peralta MA

## 2020-06-11 DIAGNOSIS — I10 ESSENTIAL HYPERTENSION WITH GOAL BLOOD PRESSURE LESS THAN 140/90: ICD-10-CM

## 2020-06-11 RX ORDER — LISINOPRIL 10 MG/1
TABLET ORAL
Qty: 45 TABLET | Refills: 1 | Status: SHIPPED | OUTPATIENT
Start: 2020-06-11 | End: 2020-12-01

## 2020-06-11 NOTE — TELEPHONE ENCOUNTER
"Requested Prescriptions   Pending Prescriptions Disp Refills     lisinopril (ZESTRIL) 10 MG tablet [Pharmacy Med Name: LISINOPRIL 10 MG TABLET] 45 tablet 0     Sig: TAKE ONE-HALF TABLET BY MOUTH DAILY       ACE Inhibitors (Including Combos) Protocol Passed - 6/11/2020 12:11 AM        Passed - Blood pressure under 140/90 in past 12 months     BP Readings from Last 3 Encounters:   01/28/20 137/79   06/18/19 110/68   11/14/18 116/74                 Passed - Recent (12 mo) or future (30 days) visit within the authorizing provider's specialty     Patient has had an office visit with the authorizing provider or a provider within the authorizing providers department within the previous 12 mos or has a future within next 30 days. See \"Patient Info\" tab in inbasket, or \"Choose Columns\" in Meds & Orders section of the refill encounter.              Passed - Medication is active on med list        Passed - Patient is age 18 or older        Passed - No active pregnancy on record        Passed - Normal serum creatinine on file in past 12 months     Recent Labs   Lab Test 01/24/20  0829   CR 0.60       Ok to refill medication if creatinine is low          Passed - Normal serum potassium on file in past 12 months     Recent Labs   Lab Test 01/24/20  0829   POTASSIUM 4.2             Passed - No positive pregnancy test within past 12 months           Prescription approved per [facility].  "

## 2020-08-07 DIAGNOSIS — E78.5 HYPERLIPIDEMIA LDL GOAL <100: ICD-10-CM

## 2020-08-10 RX ORDER — SIMVASTATIN 40 MG
TABLET ORAL
Qty: 135 TABLET | Refills: 1 | Status: SHIPPED | OUTPATIENT
Start: 2020-08-10 | End: 2021-04-06

## 2020-11-05 DIAGNOSIS — E11.9 TYPE 2 DIABETES MELLITUS WITHOUT COMPLICATION, WITHOUT LONG-TERM CURRENT USE OF INSULIN (H): ICD-10-CM

## 2020-11-06 NOTE — TELEPHONE ENCOUNTER
"Medication filled 1 time as pt is due for a follow-up in clinic. My chart has been sent to patient notifying to schedule appointment.    Molly HENDERSON RN      Requested Prescriptions   Pending Prescriptions Disp Refills     metFORMIN (GLUCOPHAGE) 1000 MG tablet 180 tablet 1       Biguanide Agents Failed - 11/5/2020 11:25 AM        Failed - Patient has documented A1c within the specified period of time.     If HgbA1C is 8 or greater, it needs to be on file within the past 3 months.  If less than 8, must be on file within the past 6 months.     Recent Labs   Lab Test 04/28/20  1305   A1C 6.4*             Failed - Recent (6 mo) or future (30 days) visit within the authorizing provider's specialty     Patient had office visit in the last 6 months or has a visit in the next 30 days with authorizing provider or within the authorizing provider's specialty.  See \"Patient Info\" tab in inbasket, or \"Choose Columns\" in Meds & Orders section of the refill encounter.            Passed - Patient is age 10 or older        Passed - Patient's CR is NOT>1.4 OR Patient's EGFR is NOT<45 within past 12 mos.     Recent Labs   Lab Test 01/24/20  0829   GFRESTIMATED >90   GFRESTBLACK >90       Recent Labs   Lab Test 01/24/20  0829   CR 0.60             Passed - Patient does NOT have a diagnosis of CHF.        Passed - Medication is active on med list        Passed - Patient is not pregnant        Passed - Patient has not had a positive pregnancy test within the past 12 mos.          '      "

## 2020-11-25 ENCOUNTER — DOCUMENTATION ONLY (OUTPATIENT)
Dept: FAMILY MEDICINE | Facility: CLINIC | Age: 60
End: 2020-11-25

## 2020-11-25 ENCOUNTER — APPOINTMENT (OUTPATIENT)
Dept: LAB | Facility: CLINIC | Age: 60
End: 2020-11-25
Payer: COMMERCIAL

## 2020-11-25 DIAGNOSIS — E11.9 TYPE 2 DIABETES MELLITUS WITHOUT COMPLICATION, WITHOUT LONG-TERM CURRENT USE OF INSULIN (H): Primary | ICD-10-CM

## 2020-11-25 NOTE — PROGRESS NOTES
There are no orders for this patient for the upcoming lab visit. Please order labs as needed.     Reason for visit A1C.    Thank you, lab.

## 2020-12-01 ENCOUNTER — OFFICE VISIT (OUTPATIENT)
Dept: FAMILY MEDICINE | Facility: CLINIC | Age: 60
End: 2020-12-01
Payer: COMMERCIAL

## 2020-12-01 VITALS
SYSTOLIC BLOOD PRESSURE: 112 MMHG | HEART RATE: 67 BPM | WEIGHT: 143 LBS | DIASTOLIC BLOOD PRESSURE: 72 MMHG | BODY MASS INDEX: 25.34 KG/M2 | OXYGEN SATURATION: 100 % | TEMPERATURE: 98.3 F | HEIGHT: 63 IN

## 2020-12-01 DIAGNOSIS — I10 ESSENTIAL HYPERTENSION WITH GOAL BLOOD PRESSURE LESS THAN 140/90: ICD-10-CM

## 2020-12-01 DIAGNOSIS — E78.5 HYPERLIPIDEMIA LDL GOAL <100: Primary | ICD-10-CM

## 2020-12-01 DIAGNOSIS — E11.9 TYPE 2 DIABETES MELLITUS WITHOUT COMPLICATION, WITHOUT LONG-TERM CURRENT USE OF INSULIN (H): ICD-10-CM

## 2020-12-01 LAB
ALBUMIN SERPL-MCNC: 3.8 G/DL (ref 3.4–5)
ALP SERPL-CCNC: 45 U/L (ref 40–150)
ALT SERPL W P-5'-P-CCNC: 27 U/L (ref 0–50)
ANION GAP SERPL CALCULATED.3IONS-SCNC: 5 MMOL/L (ref 3–14)
AST SERPL W P-5'-P-CCNC: 15 U/L (ref 0–45)
BILIRUB SERPL-MCNC: 0.4 MG/DL (ref 0.2–1.3)
BUN SERPL-MCNC: 19 MG/DL (ref 7–30)
CALCIUM SERPL-MCNC: 9.4 MG/DL (ref 8.5–10.1)
CHLORIDE SERPL-SCNC: 108 MMOL/L (ref 94–109)
CHOLEST SERPL-MCNC: 191 MG/DL
CO2 SERPL-SCNC: 25 MMOL/L (ref 20–32)
CREAT SERPL-MCNC: 0.69 MG/DL (ref 0.52–1.04)
GFR SERPL CREATININE-BSD FRML MDRD: >90 ML/MIN/{1.73_M2}
GLUCOSE SERPL-MCNC: 189 MG/DL (ref 70–99)
HBA1C MFR BLD: 6.7 % (ref 0–5.6)
HDLC SERPL-MCNC: 64 MG/DL
LDLC SERPL CALC-MCNC: 106 MG/DL
NONHDLC SERPL-MCNC: 127 MG/DL
POTASSIUM SERPL-SCNC: 4.5 MMOL/L (ref 3.4–5.3)
PROT SERPL-MCNC: 7.5 G/DL (ref 6.8–8.8)
SODIUM SERPL-SCNC: 138 MMOL/L (ref 133–144)
TRIGL SERPL-MCNC: 104 MG/DL

## 2020-12-01 PROCEDURE — 80061 LIPID PANEL: CPT | Performed by: PHYSICIAN ASSISTANT

## 2020-12-01 PROCEDURE — 80053 COMPREHEN METABOLIC PANEL: CPT | Performed by: PHYSICIAN ASSISTANT

## 2020-12-01 PROCEDURE — 83036 HEMOGLOBIN GLYCOSYLATED A1C: CPT | Performed by: PHYSICIAN ASSISTANT

## 2020-12-01 PROCEDURE — 99213 OFFICE O/P EST LOW 20 MIN: CPT | Performed by: PHYSICIAN ASSISTANT

## 2020-12-01 PROCEDURE — 36415 COLL VENOUS BLD VENIPUNCTURE: CPT | Performed by: PHYSICIAN ASSISTANT

## 2020-12-01 RX ORDER — LISINOPRIL 5 MG/1
5 TABLET ORAL DAILY
Qty: 90 TABLET | Refills: 1 | Status: SHIPPED | OUTPATIENT
Start: 2020-12-01 | End: 2021-01-10

## 2020-12-01 ASSESSMENT — MIFFLIN-ST. JEOR: SCORE: 1174.83

## 2020-12-01 NOTE — RESULT ENCOUNTER NOTE
Rubi,    Your cholesterol profile looks good.  The LDL is slightly above goal of 100, but I wouldn't wan to increase your dose of zocor.    Your electrolytes, liver and kidney function tests were normal.    Your A1c was still showing good diabetes control at 6.7% but is higher that last time as you predicted!    Luz Maria Demarco PA-C

## 2020-12-01 NOTE — PROGRESS NOTES
HPI: Rubi is a 58 yo female here for f/u DM, HTN and hyperlipidemia  She is checking her blood sugars and her 90 day ave of 135  She has episodes of hypoglycemia marbin with exercise or at night   She is using the Freestyle shalonda and likes this technology  Feels she is mostly on track with diet and exercise.  She had her eye exam in Feb 2020  Denies numbness in feet/toes      Lab Results   Component Value Date    A1C 6.4 04/28/2020    A1C 7.7 01/24/2020    A1C 6.5 05/31/2019    A1C 6.9 11/09/2018    A1C 7.2 04/16/2018       Past Medical History:   Diagnosis Date     Atypical squamous cells of undetermined significance (ASCUS) on Papanicolaou smear of cervix 10/9/2015     GERD (gastroesophageal reflux disease)      HTN (hypertension)      Hyperlipidemia      Type 2 diabetes mellitus without complications (H) 10/22/2015     Past Surgical History:   Procedure Laterality Date     APPENDECTOMY  1976    at time of USO     ARTHROSCOPY KNEE RT/LT       C LAP OVARIAN CYSTOTOMY  1976    salpingooopherectomy for large dermoid     COLONOSCOPY N/A 11/9/2015    Procedure: COLONOSCOPY;  Surgeon: Kate Redmond MD;  Location:  GI     Social History     Tobacco Use     Smoking status: Never Smoker     Smokeless tobacco: Never Used   Substance Use Topics     Alcohol use: No     Alcohol/week: 0.0 standard drinks     Current Outpatient Medications   Medication Sig Dispense Refill     alcohol swab prep pads Use to swab area of injection/tim as directed. 100 each 3     aspirin 81 MG tablet Take 1 tablet (81 mg) by mouth daily 90 tablet 3     blood glucose calibration (NO BRAND SPECIFIED) solution To accompany: Blood Glucose Monitor Brands: per insurance. 1 Bottle 3     blood glucose monitoring (NO BRAND SPECIFIED) meter device kit Use to test blood sugar 2 times daily or as directed. Preferred blood glucose meter OR supplies to accompany: Blood Glucose Monitor Brands: per insurance. 1 kit 0     blood glucose monitoring (SOFTCLIX)  "lancets TESTING 2X DAILY USE WITH LANCETING DEVICE 100 each 0     Continuous Blood Gluc  (FREESTYLE ADRIANA 14 DAY READER) ROSALIE 1 Device continuous 1 Device 0     Continuous Blood Gluc Sensor (FREESTYLE ADRIANA 14 DAY SENSOR) MISC 1 Device every 14 days 2 each 11     glimepiride (AMARYL) 2 MG tablet Take 3 tablets (6 mg) by mouth every morning (before breakfast) 270 tablet 4     lisinopril (ZESTRIL) 5 MG tablet Take 1 tablet (5 mg) by mouth daily 90 tablet 1     metFORMIN (GLUCOPHAGE) 1000 MG tablet TAKE 1 TABLET BY MOUTH TWICE A DAY WITH MEALS 180 tablet 3     Multiple Vitamin (MULTIVITAMIN OR) Take 1 tablet by mouth daily.       OMEPRAZOLE PO        simvastatin (ZOCOR) 40 MG tablet TAKE ONE AND ONE-HALF TABLETS BY MOUTH DAILY 135 tablet 1     triamcinolone (KENALOG) 0.1 % cream Apply sparingly to affected area three times daily for 14 days. (Patient taking differently: as needed Apply sparingly to affected area three times daily for 14 days.) 30 g 3     TRUETEST test strip TEST TWICE DAILY 100 strip 3     Urea 40 % CREA applt ohands and feet daily 198.6 g 11     Allergies   Allergen Reactions     Keflex [Cephalexin Hcl] Hives     Penicillins Hives     Ragweeds      Sneezing etc.     FAMILY HISTORY NOTED AND REVIEWED      PHYSICAL EXAM:    /72 (BP Location: Right arm, Patient Position: Chair, Cuff Size: Adult Large)   Pulse 67   Temp 98.3  F (36.8  C) (Oral)   Ht 1.588 m (5' 2.5\")   Wt 64.9 kg (143 lb)   SpO2 100%   Breastfeeding No   BMI 25.74 kg/m      Patient appears non toxic  Lungs: CTA bilat  Heart: RRR without m/r/g.  Extr: no edema    Assessment and Plan:     (E78.5) Hyperlipidemia LDL goal <100  (primary encounter diagnosis)  Comment:   Plan: Lipid panel reflex to direct LDL Fasting            (E11.9) Type 2 diabetes mellitus without complication, without long-term current use of insulin (H)  Comment: has worked with the diabetic educator  Plan: Hemoglobin A1c, metFORMIN (GLUCOPHAGE) 1000 " MG         tablet        refilled    (I10) Essential hypertension with goal blood pressure less than 140/90  Comment: well controlled.   Plan: Comprehensive metabolic panel, lisinopril         (ZESTRIL) 5 MG tablet refilled              Luz Maria Demarco PA-C

## 2021-01-09 DIAGNOSIS — I10 ESSENTIAL HYPERTENSION WITH GOAL BLOOD PRESSURE LESS THAN 140/90: ICD-10-CM

## 2021-01-09 NOTE — TELEPHONE ENCOUNTER
Lisinopril 10 mg tablet    Summary: Take 1 tablet (5 mg) by mouth daily, Disp-90 tablet, R-1, E-Prescribe   Dose, Route, Frequency: 5 mg, Oral, DAILY  Start: 12/1/2020  Ord/Sold: 12/1/2020

## 2021-01-10 ENCOUNTER — HEALTH MAINTENANCE LETTER (OUTPATIENT)
Age: 61
End: 2021-01-10

## 2021-01-10 RX ORDER — LISINOPRIL 5 MG/1
5 TABLET ORAL DAILY
Qty: 90 TABLET | Refills: 1 | Status: SHIPPED | OUTPATIENT
Start: 2021-01-10 | End: 2021-09-02

## 2021-02-04 DIAGNOSIS — E11.9 TYPE 2 DIABETES MELLITUS WITHOUT COMPLICATION, WITHOUT LONG-TERM CURRENT USE OF INSULIN (H): ICD-10-CM

## 2021-02-05 NOTE — TELEPHONE ENCOUNTER
Freestyle Sanjuana 14 day sensor    Summary: 1 Device every 14 days, Disp-2 each, R-11, E-Prescribe   Dose, Route, Frequency: 1 Device, Does not apply, EVERY 14 DAYS  Start: 1/31/2020  Ord/Sold: 1/31/2020

## 2021-02-08 RX ORDER — FLASH GLUCOSE SENSOR
1 KIT MISCELLANEOUS
Qty: 2 EACH | Refills: 11 | Status: SHIPPED | OUTPATIENT
Start: 2021-02-08 | End: 2022-01-25

## 2021-03-22 ENCOUNTER — HOSPITAL ENCOUNTER (OUTPATIENT)
Dept: MAMMOGRAPHY | Facility: CLINIC | Age: 61
Discharge: HOME | End: 2021-03-22
Attending: PHYSICIAN ASSISTANT | Admitting: PHYSICIAN ASSISTANT
Payer: COMMERCIAL

## 2021-03-22 DIAGNOSIS — Z12.31 SCREENING MAMMOGRAM, ENCOUNTER FOR: ICD-10-CM

## 2021-03-22 PROCEDURE — 77067 SCR MAMMO BI INCL CAD: CPT

## 2021-04-06 DIAGNOSIS — E78.5 HYPERLIPIDEMIA LDL GOAL <100: ICD-10-CM

## 2021-04-06 RX ORDER — SIMVASTATIN 40 MG
TABLET ORAL
Qty: 135 TABLET | Refills: 1 | Status: SHIPPED | OUTPATIENT
Start: 2021-04-06 | End: 2021-10-08

## 2021-04-06 NOTE — TELEPHONE ENCOUNTER
Prescription approved per [facility] Refill Protocol.  Danica Clifford, RN  [facility] RN Triage Team

## 2021-04-07 DIAGNOSIS — E11.9 TYPE 2 DIABETES MELLITUS WITHOUT COMPLICATION, WITHOUT LONG-TERM CURRENT USE OF INSULIN (H): ICD-10-CM

## 2021-04-07 RX ORDER — GLIMEPIRIDE 2 MG/1
6 TABLET ORAL
Qty: 270 TABLET | Refills: 4 | Status: SHIPPED | OUTPATIENT
Start: 2021-04-07 | End: 2022-07-14

## 2021-05-07 ENCOUNTER — TELEPHONE (OUTPATIENT)
Dept: FAMILY MEDICINE | Facility: CLINIC | Age: 61
End: 2021-05-07

## 2021-05-07 NOTE — TELEPHONE ENCOUNTER
Prior Authorization Retail Medication Request    Medication/Dose: Freestyle Sanjuana 14 day sensor  ICD code (if different than what is on RX):  E11.9  Previously Tried and Failed:  None  Rationale:  Patient using freestyle sanjuana sensor and has had marked improvement and stability in hemoglobin A1C    Insurance Name:  [facility]  Insurance ID:  [id number]      Pharmacy Information (if different than what is on RX)  [id number] [place]  Phone:  [phone]

## 2021-05-11 NOTE — TELEPHONE ENCOUNTER
PA Initiation    Medication: Freestyle Sanjuana 14 day sensor  Insurance Company: [employer] - Phone [phone] Fax [phone]  [id number] [place] [facility] [place] - [address]  Filling Pharmacy Phone: [phone]  Filling Pharmacy Fax: [phone]  Start Date: 5/11/2021

## 2021-06-15 NOTE — TELEPHONE ENCOUNTER
Initially told that no PA required for Dupixent, but not the case. Received approval 6/11/21 to 6/11/22 from [employer] (Auth# [id number]). [facility] info and is waiting on patient with co-pay assist.    metFORMIN (GLUCOPHAGE) 1000 MG tablet 180 tablet 1 2/3/2017  No   Sig: TAKE ONE TABLET BY MOUTH TWICE A DAY WITH MEALS              Last Written Prescription Date: 02/03/2017  Last Fill Quantity: 180, # refills: 1  Last Office Visit with [facility], [facility] or [facility] prescribing provider:  MARIANA Moon  7-25-17, Luz Maria PCP 2/2017        BP Readings from Last 3 Encounters:   07/25/17 126/75   02/13/17 122/84   06/30/16 126/79     Lab Results   Component Value Date    MICROL 10 06/29/2016     Lab Results   Component Value Date    UMALCR 8.58 06/29/2016     Creatinine   Date Value Ref Range Status   06/29/2016 0.65 0.52 - 1.04 mg/dL Final   ]  GFR Estimate   Date Value Ref Range Status   06/29/2016 >90  Non  GFR Calc   >60 mL/min/1.7m2 Final   06/01/2015 >90  Non  GFR Calc   >60 mL/min/1.7m2 Final   11/21/2014 >90  Non  GFR Calc   >60 mL/min/1.7m2 Final     GFR Estimate If Black   Date Value Ref Range Status   06/29/2016 >90   GFR Calc   >60 mL/min/1.7m2 Final   06/01/2015 >90   GFR Calc   >60 mL/min/1.7m2 Final   11/21/2014 >90   GFR Calc   >60 mL/min/1.7m2 Final     Lab Results   Component Value Date    CHOL 165 01/31/2017     Lab Results   Component Value Date    HDL 59 01/31/2017     Lab Results   Component Value Date    LDL 75 01/31/2017     Lab Results   Component Value Date    TRIG 154 01/31/2017     Lab Results   Component Value Date    CHOLHDLRATIO 3.1 06/01/2015     Lab Results   Component Value Date    AST 24 06/29/2016     Lab Results   Component Value Date    ALT 43 06/29/2016     Lab Results   Component Value Date    A1C 7.1 05/30/2017    A1C 7.2 01/31/2017    A1C 6.8 09/27/2016    A1C 7.2 06/20/2016    A1C 7.1 12/18/2015     Potassium   Date Value Ref Range Status   06/29/2016 4.2 3.4 - 5.3 mmol/L Final

## 2021-07-03 ENCOUNTER — HEALTH MAINTENANCE LETTER (OUTPATIENT)
Age: 61
End: 2021-07-03

## 2021-08-19 NOTE — TELEPHONE ENCOUNTER
Prior Authorization Approval    Authorization Effective Date: 5/11/2021  Authorization Expiration Date: 5/11/2022  Medication: Freestyle Sanjuana 14 day sensor  Approved Dose/Quantity:   Reference #: [id number]   Insurance Company: [employer] - Phone [phone] Fax [phone]  Expected CoPay:       CoPay Card Available:      Foundation Assistance Needed:    Which Pharmacy is filling the prescription (Not needed for infusion/clinic administered): [facility] [id number] [place] [address]  Pharmacy Notified: Yes  Patient Notified: Yes, **Instructed pharmacy to notify patient when script is ready to /ship.**         not applicable (Male)

## 2021-08-26 DIAGNOSIS — E11.9 TYPE 2 DIABETES MELLITUS WITHOUT COMPLICATION, WITHOUT LONG-TERM CURRENT USE OF INSULIN (H): ICD-10-CM

## 2021-08-26 RX ORDER — FLASH GLUCOSE SCANNING READER
1 EACH MISCELLANEOUS CONTINUOUS
Qty: 1 EACH | Refills: 0 | Status: SHIPPED | OUTPATIENT
Start: 2021-08-26 | End: 2021-09-24
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
## 2021-08-27 ENCOUNTER — LAB (OUTPATIENT)
Dept: LAB | Facility: CLINIC | Age: 61
End: 2021-08-27
Payer: COMMERCIAL

## 2021-08-27 DIAGNOSIS — E11.9 TYPE 2 DIABETES MELLITUS WITHOUT COMPLICATION, WITHOUT LONG-TERM CURRENT USE OF INSULIN (H): ICD-10-CM

## 2021-08-27 LAB — HBA1C MFR BLD: 6.8 % (ref 0–5.6)

## 2021-08-27 PROCEDURE — 83036 HEMOGLOBIN GLYCOSYLATED A1C: CPT

## 2021-08-27 PROCEDURE — 36415 COLL VENOUS BLD VENIPUNCTURE: CPT

## 2021-09-02 ENCOUNTER — OFFICE VISIT (OUTPATIENT)
Dept: FAMILY MEDICINE | Facility: CLINIC | Age: 61
End: 2021-09-02
Payer: COMMERCIAL

## 2021-09-02 VITALS
SYSTOLIC BLOOD PRESSURE: 147 MMHG | HEIGHT: 63 IN | WEIGHT: 140 LBS | DIASTOLIC BLOOD PRESSURE: 84 MMHG | HEART RATE: 70 BPM | BODY MASS INDEX: 24.8 KG/M2 | TEMPERATURE: 96.9 F | OXYGEN SATURATION: 100 %

## 2021-09-02 DIAGNOSIS — I10 ESSENTIAL HYPERTENSION WITH GOAL BLOOD PRESSURE LESS THAN 140/90: ICD-10-CM

## 2021-09-02 DIAGNOSIS — E11.9 TYPE 2 DIABETES MELLITUS WITHOUT COMPLICATION, WITHOUT LONG-TERM CURRENT USE OF INSULIN (H): Primary | ICD-10-CM

## 2021-09-02 DIAGNOSIS — Z12.11 SCREEN FOR COLON CANCER: ICD-10-CM

## 2021-09-02 DIAGNOSIS — Z86.0100 HISTORY OF COLONIC POLYPS: ICD-10-CM

## 2021-09-02 PROCEDURE — 99207 PR FOOT EXAM NO CHARGE: CPT | Mod: 25 | Performed by: PHYSICIAN ASSISTANT

## 2021-09-02 PROCEDURE — 99214 OFFICE O/P EST MOD 30 MIN: CPT | Performed by: PHYSICIAN ASSISTANT

## 2021-09-02 RX ORDER — LISINOPRIL 5 MG/1
5 TABLET ORAL DAILY
Qty: 90 TABLET | Refills: 3 | Status: SHIPPED | OUTPATIENT
Start: 2021-09-02 | End: 2021-09-02

## 2021-09-02 RX ORDER — LISINOPRIL 10 MG/1
10 TABLET ORAL DAILY
Qty: 90 TABLET | Refills: 3 | Status: ON HOLD | OUTPATIENT
Start: 2021-09-02 | End: 2022-03-01

## 2021-09-02 ASSESSMENT — MIFFLIN-ST. JEOR: SCORE: 1156.23

## 2021-09-02 NOTE — PROGRESS NOTES
HPI: Rubi is a 61 yo female here for follow-up DM and HTN  Last eye exam: has appt coming up Oct 28th  Blood sugars: average 125   Exercises more in the summer  Uses Freestyle shalonda  Denies any numbness/tingling in feet.  Does not monitor her BP      Lab Results   Component Value Date    A1C 6.8 08/27/2021    A1C 6.7 12/01/2020    A1C 6.4 04/28/2020    A1C 7.7 01/24/2020    A1C 6.5 05/31/2019    A1C 6.9 11/09/2018     Past Medical History:   Diagnosis Date     Atypical squamous cells of undetermined significance (ASCUS) on Papanicolaou smear of cervix 10/9/2015     GERD (gastroesophageal reflux disease)      HTN (hypertension)      Hyperlipidemia      Type 2 diabetes mellitus without complications (H) 10/22/2015     Past Surgical History:   Procedure Laterality Date     APPENDECTOMY  1976    at time of USO     ARTHROSCOPY KNEE RT/LT       C LAP OVARIAN CYSTOTOMY  1976    salpingooopherectomy for large dermoid     COLONOSCOPY N/A 11/9/2015    Procedure: COLONOSCOPY;  Surgeon: Kate Redmond MD;  Location:  GI     Social History     Tobacco Use     Smoking status: Never Smoker     Smokeless tobacco: Never Used   Substance Use Topics     Alcohol use: No     Alcohol/week: 0.0 standard drinks     Current Outpatient Medications   Medication Sig Dispense Refill     alcohol swab prep pads Use to swab area of injection/tim as directed. 100 each 3     aspirin 81 MG tablet Take 1 tablet (81 mg) by mouth daily 90 tablet 3     blood glucose calibration (NO BRAND SPECIFIED) solution To accompany: Blood Glucose Monitor Brands: per insurance. 1 Bottle 3     blood glucose monitoring (NO BRAND SPECIFIED) meter device kit Use to test blood sugar 2 times daily or as directed. Preferred blood glucose meter OR supplies to accompany: Blood Glucose Monitor Brands: per insurance. 1 kit 0     blood glucose monitoring (SOFTCLIX) lancets TESTING 2X DAILY USE WITH LANCETING DEVICE 100 each 0     Continuous Blood Gluc   "(FREESTYLE ADRIANA 14 DAY READER) ROSALIE 1 Device continuous 1 each 0     Continuous Blood Gluc Sensor (FREESTYLE ADRIANA 14 DAY SENSOR) MISC 1 Device every 14 days 2 each 11     glimepiride (AMARYL) 2 MG tablet TAKE 3 TABLETS (6 MG) BY MOUTH EVERY MORNING (BEFORE BREAKFAST) 270 tablet 4     lisinopril (ZESTRIL) 10 MG tablet Take 1 tablet (10 mg) by mouth daily 90 tablet 3     metFORMIN (GLUCOPHAGE) 1000 MG tablet TAKE 1 TABLET BY MOUTH TWICE A DAY WITH MEALS 180 tablet 3     Multiple Vitamin (MULTIVITAMIN OR) Take 1 tablet by mouth daily.       OMEPRAZOLE PO        simvastatin (ZOCOR) 40 MG tablet TAKE 1 AND 1/2 TABLETS BY MOUTH DAILY. FILL 8-21-20 135 tablet 1     triamcinolone (KENALOG) 0.1 % cream Apply sparingly to affected area three times daily for 14 days. (Patient taking differently: as needed Apply sparingly to affected area three times daily for 14 days.) 30 g 3     TRUETEST test strip TEST TWICE DAILY 100 strip 3     Allergies   Allergen Reactions     Keflex [Cephalexin Hcl] Hives     Penicillins Hives     Ragweeds      Sneezing etc.     FAMILY HISTORY NOTED AND REVIEWED    REVIEW OF SYSTEMS: denies chest pain, palpit or sob. Denies edema.    PHYSICAL EXAM:    BP (!) 147/84 (BP Location: Left arm, Patient Position: Chair, Cuff Size: Adult Regular)   Pulse 70   Temp 96.9  F (36.1  C) (Temporal)   Ht 1.588 m (5' 2.5\")   Wt 63.5 kg (140 lb)   SpO2 100%   BMI 25.20 kg/m      Patient appears non toxic  Lungs: cTA bilat  Heart: RRR  Foot exam: no erythema, edema or ulcerations.         Assessment and Plan:     (E11.9) Type 2 diabetes mellitus without complication, without long-term current use of insulin (H)  (primary encounter diagnosis)  Comment: A1c at goal. Eye exam is scheduled. F/u 6 months.  Plan: FOOT EXAM            (Z12.11) Screen for colon cancer  Comment: colonoscopy due as pt has personal hx of polyps  Plan: Adult Gastro Ref - Procedure Only            (Z86.010) History of colonic polyps  Comment: "   Plan:     (I10) Essential hypertension with goal blood pressure less than 140/90  Comment: BP elevated. Recd pt increase dose of lisinopril to 10mg every day and send me BP readings in a few weeks  Plan: lisinopril (ZESTRIL) 10 MG tablet QD                      Luz Maria Demarco PA-C

## 2021-09-22 DIAGNOSIS — E11.9 TYPE 2 DIABETES MELLITUS WITHOUT COMPLICATION, WITHOUT LONG-TERM CURRENT USE OF INSULIN (H): ICD-10-CM

## 2021-09-24 RX ORDER — FLASH GLUCOSE SCANNING READER
EACH MISCELLANEOUS
Qty: 1 EACH | Refills: 0 | Status: SHIPPED | OUTPATIENT
Start: 2021-09-24 | End: 2021-12-21

## 2021-10-08 DIAGNOSIS — E78.5 HYPERLIPIDEMIA LDL GOAL <100: ICD-10-CM

## 2021-10-08 RX ORDER — SIMVASTATIN 40 MG
TABLET ORAL
Qty: 135 TABLET | Refills: 1 | Status: ON HOLD | OUTPATIENT
Start: 2021-10-08 | End: 2022-03-01

## 2021-10-23 ENCOUNTER — HEALTH MAINTENANCE LETTER (OUTPATIENT)
Age: 61
End: 2021-10-23

## 2021-10-28 ENCOUNTER — TRANSFERRED RECORDS (OUTPATIENT)
Dept: MULTI SPECIALTY CLINIC | Facility: CLINIC | Age: 61
End: 2021-10-28
Payer: COMMERCIAL

## 2021-10-28 LAB — RETINOPATHY: NEGATIVE

## 2021-11-30 DIAGNOSIS — E11.9 TYPE 2 DIABETES MELLITUS WITHOUT COMPLICATION, WITHOUT LONG-TERM CURRENT USE OF INSULIN (H): ICD-10-CM

## 2021-12-02 NOTE — TELEPHONE ENCOUNTER
Prescription approved per [facility] Refill Protocol.  LOV: 9/2/2021  Due for visit 3/2/2022 per last office visit.    Swapnil Ceja RN  [facility]

## 2021-12-20 DIAGNOSIS — E11.9 TYPE 2 DIABETES MELLITUS WITHOUT COMPLICATION, WITHOUT LONG-TERM CURRENT USE OF INSULIN (H): ICD-10-CM

## 2021-12-21 RX ORDER — FLASH GLUCOSE SCANNING READER
EACH MISCELLANEOUS
Qty: 1 EACH | Refills: 0 | Status: SHIPPED | OUTPATIENT
Start: 2021-12-21

## 2021-12-21 NOTE — TELEPHONE ENCOUNTER
Routing refill request to provider for review/approval because:  Drug not on the [facility] refill protocol     Hermes WILSON RN

## 2022-01-07 DIAGNOSIS — E11.9 TYPE 2 DIABETES MELLITUS WITHOUT COMPLICATION, WITHOUT LONG-TERM CURRENT USE OF INSULIN (H): ICD-10-CM

## 2022-01-07 NOTE — TELEPHONE ENCOUNTER
Routing refill request to provider for review/approval because:  Labs not current:    Creatinine   Date Value Ref Range Status   12/01/2020 0.69 0.52 - 1.04 mg/dL Final       Molly HENDERSON, Triage RN  [facility]

## 2022-01-24 DIAGNOSIS — E11.9 TYPE 2 DIABETES MELLITUS WITHOUT COMPLICATION, WITHOUT LONG-TERM CURRENT USE OF INSULIN (H): ICD-10-CM

## 2022-01-25 RX ORDER — FLASH GLUCOSE SENSOR
KIT MISCELLANEOUS
Qty: 2 EACH | Refills: 11 | Status: SHIPPED | OUTPATIENT
Start: 2022-01-25 | End: 2023-06-14

## 2022-01-25 NOTE — TELEPHONE ENCOUNTER
Routing refill request to provider for review/approval because:  Drug not on the [facility] refill protocol   Karlee Valentine RN

## 2022-01-31 ENCOUNTER — TELEPHONE (OUTPATIENT)
Dept: GASTROENTEROLOGY | Facility: CLINIC | Age: 62
End: 2022-01-31
Payer: COMMERCIAL

## 2022-01-31 NOTE — TELEPHONE ENCOUNTER
Screening Questions  Blue=prep questions Red=location Green=sedation   1. Are you active on mychart? Y    2. What insurance is in the chart? MEDICA     3.  Ordering/Referring Provider: MAGAN SADLER    4. BMI 25.2, If greater than 40 review exclusion criteria also will need EXTENDED PREP    5.  Respiratory Screening (If yes to any of the following HOSPITAL setting only):     Do you use daily home oxygen? N  Do you have mod to severe Obstructive Sleep Apnea? N (can be seen at [facility] or hospital setting)    Do you have Pulmonary Hypertension? N   Do you have UNCONTROLLED asthma? N    6. Have you had a heart or lung transplant? N  (If yes, please review exclusion criteria)    7. Are you currently on dialysis?N  (If yes, schedule in HOSPITAL setting only)(If yes, please send Golytely prep)    8. Do you have chronic kidney disease? N (If yes, please send Golytely prep)    9. Have you had a stroke or Transient ischemic attack (TIA) within 6 months? N (If yes, do not schedule at [facility])    10. In the past 6 months, have you had any heart related issues including cardiomyopathy or heart attack? N (If yes, please review exclusion criteria)           If yes, did it require cardiac stenting or other implantable device?  (If yes, please review exclusion criteria)      11. Do you have any implantable devices in your body (pacemaker, defib, LVAD)? N (If yes, schedule at [facility])    12. Do you take nitroglycerin? If yes, how often? N (if yes, schedule at HOSPITAL setting)    13. Are you currently taking any blood thinners?N (If yes- inform patient to follow up with PCP or provider for follow up instructions)     14. Are you a diabetic? Y (If yes, please send Golytely prep)    15. (Females) Are you currently pregnant?   If yes, how many weeks?      16. Are you taking any prescription pain medications on a routine schedule? N If yes, MAC sedation and patient will need EXTENDED PREP.    17. Do you have any chemical dependencies such as alcohol,  street drugs, or methadone? N If yes, MAC sedation     18. Do you have any history of post-traumatic stress syndrome, severe anxiety or history of psychosis? N  If yes, MAC sedation.     19. Do you transfer independently? Y    20.  Do you have any issues with constipation?   N If yes, pt will need EXTENDED PREP     21. [id number] [place] [address]    Scheduling Details    Which Colonoscopy Prep was Sent?: GOLYTELY  Type of Procedure Scheduled: COLONOSCOPY  Surgeon: SEBASTIEN  Date of Procedure: 2/14/22  Location: [facility]  Caller (Please ask for phone number if not scheduled by patient): Rubi Nicolas        Sedation Type: CS  Conscious Sedation- Needs  for 6 hours after the procedure  MAC/General-Needs  for 24 hours after procedure    Pre-op Required at [facility] [place], [place] and OR for MAC sedation:   (if yes advise patient they will need a pre-op prior to procedure)      Informed patient they will need an adult  Y  Cannot take any type of public or medical transportation alone    Pre-Procedure Covid test to be completed at [facility] or Externally: 2/11 JESSI    Confirmed Nurse will call to complete assessment Y    Additional comments:  ([facility] PATIENTS NEED EXTENDED PREP)

## 2022-02-01 DIAGNOSIS — Z11.59 ENCOUNTER FOR SCREENING FOR OTHER VIRAL DISEASES: Primary | ICD-10-CM

## 2022-02-11 ENCOUNTER — LAB (OUTPATIENT)
Dept: LAB | Facility: CLINIC | Age: 62
End: 2022-02-11
Payer: COMMERCIAL

## 2022-02-11 DIAGNOSIS — Z11.59 ENCOUNTER FOR SCREENING FOR OTHER VIRAL DISEASES: ICD-10-CM

## 2022-02-11 LAB — SARS-COV-2 RNA RESP QL NAA+PROBE: NEGATIVE

## 2022-02-11 PROCEDURE — U0003 INFECTIOUS AGENT DETECTION BY NUCLEIC ACID (DNA OR RNA); SEVERE ACUTE RESPIRATORY SYNDROME CORONAVIRUS 2 (SARS-COV-2) (CORONAVIRUS DISEASE [COVID-19]), AMPLIFIED PROBE TECHNIQUE, MAKING USE OF HIGH THROUGHPUT TECHNOLOGIES AS DESCRIBED BY CMS-2020-01-R: HCPCS

## 2022-02-11 PROCEDURE — U0005 INFEC AGEN DETEC AMPLI PROBE: HCPCS

## 2022-02-12 ENCOUNTER — HEALTH MAINTENANCE LETTER (OUTPATIENT)
Age: 62
End: 2022-02-12

## 2022-02-14 ENCOUNTER — HOSPITAL ENCOUNTER (OUTPATIENT)
Facility: CLINIC | Age: 62
Discharge: HOME | End: 2022-02-14
Attending: COLON & RECTAL SURGERY | Admitting: COLON & RECTAL SURGERY
Payer: COMMERCIAL

## 2022-02-14 VITALS
HEART RATE: 66 BPM | RESPIRATION RATE: 18 BRPM | WEIGHT: 140 LBS | TEMPERATURE: 97.3 F | HEIGHT: 62 IN | OXYGEN SATURATION: 97 % | DIASTOLIC BLOOD PRESSURE: 76 MMHG | BODY MASS INDEX: 25.76 KG/M2 | SYSTOLIC BLOOD PRESSURE: 109 MMHG

## 2022-02-14 DIAGNOSIS — Z12.11 SPECIAL SCREENING FOR MALIGNANT NEOPLASMS, COLON: Primary | ICD-10-CM

## 2022-02-14 LAB — COLONOSCOPY: NORMAL

## 2022-02-14 PROCEDURE — G0121 COLON CA SCRN NOT HI RSK IND: HCPCS | Performed by: COLON & RECTAL SURGERY

## 2022-02-14 PROCEDURE — G0500 MOD SEDAT ENDO SERVICE >5YRS: HCPCS | Performed by: COLON & RECTAL SURGERY

## 2022-02-14 PROCEDURE — 45378 DIAGNOSTIC COLONOSCOPY: CPT | Performed by: COLON & RECTAL SURGERY

## 2022-02-14 PROCEDURE — 250N000011 HC RX IP 250 OP 636: Performed by: COLON & RECTAL SURGERY

## 2022-02-14 RX ORDER — FENTANYL CITRATE 50 UG/ML
INJECTION, SOLUTION INTRAMUSCULAR; INTRAVENOUS PRN
Status: COMPLETED | OUTPATIENT
Start: 2022-02-14 | End: 2022-02-14

## 2022-02-14 RX ORDER — LIDOCAINE 40 MG/G
CREAM TOPICAL
Status: DISCONTINUED | OUTPATIENT
Start: 2022-02-14 | End: 2022-02-14 | Stop reason: HOSPADM

## 2022-02-14 RX ORDER — ONDANSETRON 2 MG/ML
4 INJECTION INTRAMUSCULAR; INTRAVENOUS
Status: DISCONTINUED | OUTPATIENT
Start: 2022-02-14 | End: 2022-02-14 | Stop reason: HOSPADM

## 2022-02-14 RX ADMIN — MIDAZOLAM 2 MG: 1 INJECTION INTRAMUSCULAR; INTRAVENOUS at 09:14

## 2022-02-14 RX ADMIN — FENTANYL CITRATE 100 MCG: 50 INJECTION, SOLUTION INTRAMUSCULAR; INTRAVENOUS at 09:13

## 2022-02-14 ASSESSMENT — MIFFLIN-ST. JEOR: SCORE: 1143.29

## 2022-02-14 NOTE — H&P
Colon & Rectal Surgery History and Physical  Pre-Endoscopy Procedure Note    History of Present Illness   I have been asked by Luz Maria Demarco to evaluate this 61 year old female for colorectal cancer screening. She had a normal screening colonoscopy in 2015 and currently denies any abdominal pain, weight loss, bleeding per rectum, or recent change in bowel habits.    Past Medical History  Diagnosis Date     Atypical squamous cells of undetermined significance (ASCUS) on Papanicolaou smear of cervix 10/9/2015     GERD (gastroesophageal reflux disease)      HTN (hypertension)      Hyperlipidemia      Type 2 diabetes mellitus without complications 10/22/2015       Past Surgical History  Procedure Laterality Date     APPENDECTOMY  1976     ARTHROSCOPY KNEE RT/LT       LAP OVARIAN CYSTOTOMY  1976    salpingooopherectomy for large dermoid        Medications  Medication Sig     aspirin 81 MG tablet Take 1 tablet (81 mg) by mouth daily     glimepiride (AMARYL) 2 MG tablet TAKE 3 TABLETS (6 MG) BY MOUTH EVERY MORNING (BEFORE BREAKFAST)     lisinopril (ZESTRIL) 10 MG tablet Take 1 tablet (10 mg) by mouth daily     metFORMIN (GLUCOPHAGE) 1000 MG tablet TAKE 1 TABLET BY MOUTH TWICE A DAY WITH MEALS     Multiple Vitamin (MULTIVITAMIN OR) Take 1 tablet by mouth daily.     OMEPRAZOLE PO      simvastatin (ZOCOR) 40 MG tablet TAKE 1 AND 1/2 TABLETS BY MOUTH DAILY. FILL 8-21-20     triamcinolone (KENALOG) 0.1 % cream Apply sparingly to affected area three times daily for 14 days       Allergies  Allergen Reactions     Keflex [Cephalexin Hcl] Hives     Penicillins Hives     Ragweeds      Sneezing etc.        Family History   Family history includes Cancer (age of onset: 68) in her father; Cerebrovascular Disease in her father; Dementia (age of onset: 89) in her mother; Diabetes in her maternal uncle and mother; Hypertension in her father and mother.     Social History   She reports that she has never smoked. She has never used smokeless  "tobacco. She reports that she does not drink alcohol and does not use drugs.    Review of Systems   Constitutional:  No fever, weight change or fatigue.    Eyes:     No dry eyes or vision changes.   Ears/Nose/Throat/Neck:  No oral ulcers, sore throat or voice change.    Cardiovascular:   No palpitations, syncope, angina or edema.   Respiratory:    No chest pain, excessive sleepiness, shortness of breath or hemoptysis.    Gastrointestinal:   No abdominal pain, nausea, vomiting, diarrhea or heartburn.    Genitourinary:   No dysuria, hematuria, urinary retention or urinary frequency.   Musculoskeletal:  No joint swelling or arthralgias.    Dermatologic:  No skin rash or other skin changes.   Neurologic:    No focal weakness or numbness. No neuropathy.   Psychiatric:    No depression, anxiety, suicidal ideation, or paranoid ideation.   Endocrine:   No cold or heat intolerance, polydipsia, hirsutism, change in libido, or flushing.   Hematology/Lymphatic:  No bleeding or lymphadenopathy.    Allergy/Immunology:  No rhinitis or hives.     Physical Exam   Vitals:  /77, HR 68, temperature 97.3  F (36.3  C), temperature source Skin, RR 14, height 1.575 m (5' 2\"), weight 63.5 kg (140 lb), SpO2 99 %, not currently breastfeeding.    General:  Alert and oriented to person, place and time   Airway: Normal oropharyngeal airway and neck mobility   Lungs:  Clear bilaterally   Heart:  Regular rate and rhythm   Abdomen: Soft, NT, ND, no masses   Extremities: Warm, good capillary refill    ASA Grade: II (mild systemic disease)    Impression: Cleared for use of conscious sedation for colorectal cancer screening    Plan: Proceed with colonoscopy     Kate Redmond MD  [facility]  [phone]  "

## 2022-02-25 ENCOUNTER — HOSPITAL ENCOUNTER (INPATIENT)
Facility: CLINIC | Age: 62
LOS: 4 days | Discharge: HOME | DRG: 247 | End: 2022-03-01
Attending: EMERGENCY MEDICINE | Admitting: INTERNAL MEDICINE
Payer: COMMERCIAL

## 2022-02-25 ENCOUNTER — APPOINTMENT (OUTPATIENT)
Dept: GENERAL RADIOLOGY | Facility: CLINIC | Age: 62
DRG: 247 | End: 2022-02-25
Attending: EMERGENCY MEDICINE
Payer: COMMERCIAL

## 2022-02-25 DIAGNOSIS — E11.9 TYPE 2 DIABETES MELLITUS WITHOUT COMPLICATION, WITHOUT LONG-TERM CURRENT USE OF INSULIN (H): ICD-10-CM

## 2022-02-25 DIAGNOSIS — R79.89 ELEVATED TROPONIN: ICD-10-CM

## 2022-02-25 DIAGNOSIS — I21.4 NSTEMI (NON-ST ELEVATED MYOCARDIAL INFARCTION) (H): Primary | ICD-10-CM

## 2022-02-25 DIAGNOSIS — I10 PRIMARY HYPERTENSION: ICD-10-CM

## 2022-02-25 DIAGNOSIS — I10 ESSENTIAL HYPERTENSION WITH GOAL BLOOD PRESSURE LESS THAN 140/90: ICD-10-CM

## 2022-02-25 DIAGNOSIS — R07.89 CHEST PRESSURE: ICD-10-CM

## 2022-02-25 DIAGNOSIS — E78.5 HYPERLIPIDEMIA LDL GOAL <70: ICD-10-CM

## 2022-02-25 LAB
ALBUMIN SERPL-MCNC: 4 G/DL (ref 3.4–5)
ALP SERPL-CCNC: 42 U/L (ref 40–150)
ALT SERPL W P-5'-P-CCNC: 33 U/L (ref 0–50)
ANION GAP SERPL CALCULATED.3IONS-SCNC: 5 MMOL/L (ref 3–14)
AST SERPL W P-5'-P-CCNC: 17 U/L (ref 0–45)
BILIRUB SERPL-MCNC: 0.3 MG/DL (ref 0.2–1.3)
BUN SERPL-MCNC: 14 MG/DL (ref 7–30)
CALCIUM SERPL-MCNC: 9.5 MG/DL (ref 8.5–10.1)
CHLORIDE BLD-SCNC: 102 MMOL/L (ref 94–109)
CO2 SERPL-SCNC: 27 MMOL/L (ref 20–32)
CREAT SERPL-MCNC: 0.67 MG/DL (ref 0.52–1.04)
ERYTHROCYTE [DISTWIDTH] IN BLOOD BY AUTOMATED COUNT: 12.5 % (ref 10–15)
GFR SERPL CREATININE-BSD FRML MDRD: >90 ML/MIN/1.73M2
GLUCOSE BLD-MCNC: 132 MG/DL (ref 70–99)
HCT VFR BLD AUTO: 38.1 % (ref 35–47)
HGB BLD-MCNC: 12.9 G/DL (ref 11.7–15.7)
MCH RBC QN AUTO: 30.2 PG (ref 26.5–33)
MCHC RBC AUTO-ENTMCNC: 33.9 G/DL (ref 31.5–36.5)
MCV RBC AUTO: 89 FL (ref 78–100)
PLATELET # BLD AUTO: 275 10E3/UL (ref 150–450)
POTASSIUM BLD-SCNC: 3.3 MMOL/L (ref 3.4–5.3)
PROT SERPL-MCNC: 7.7 G/DL (ref 6.8–8.8)
RBC # BLD AUTO: 4.27 10E6/UL (ref 3.8–5.2)
SODIUM SERPL-SCNC: 134 MMOL/L (ref 133–144)
TROPONIN I SERPL HS-MCNC: 77 NG/L
WBC # BLD AUTO: 7.2 10E3/UL (ref 4–11)

## 2022-02-25 PROCEDURE — 71046 X-RAY EXAM CHEST 2 VIEWS: CPT

## 2022-02-25 PROCEDURE — 93005 ELECTROCARDIOGRAM TRACING: CPT

## 2022-02-25 PROCEDURE — 120N000001 HC R&B MED SURG/OB

## 2022-02-25 PROCEDURE — 80061 LIPID PANEL: CPT | Performed by: INTERNAL MEDICINE

## 2022-02-25 PROCEDURE — 250N000013 HC RX MED GY IP 250 OP 250 PS 637: Performed by: EMERGENCY MEDICINE

## 2022-02-25 PROCEDURE — 80053 COMPREHEN METABOLIC PANEL: CPT | Performed by: EMERGENCY MEDICINE

## 2022-02-25 PROCEDURE — 85027 COMPLETE CBC AUTOMATED: CPT | Performed by: EMERGENCY MEDICINE

## 2022-02-25 PROCEDURE — 99285 EMERGENCY DEPT VISIT HI MDM: CPT | Mod: 25

## 2022-02-25 PROCEDURE — 36415 COLL VENOUS BLD VENIPUNCTURE: CPT | Performed by: EMERGENCY MEDICINE

## 2022-02-25 PROCEDURE — 84484 ASSAY OF TROPONIN QUANT: CPT | Performed by: EMERGENCY MEDICINE

## 2022-02-25 PROCEDURE — 99223 1ST HOSP IP/OBS HIGH 75: CPT | Mod: AI | Performed by: INTERNAL MEDICINE

## 2022-02-25 RX ORDER — ASPIRIN 325 MG
325 TABLET ORAL ONCE
Status: COMPLETED | OUTPATIENT
Start: 2022-02-25 | End: 2022-02-25

## 2022-02-25 RX ORDER — HEPARIN SODIUM 10000 [USP'U]/100ML
0-5000 INJECTION, SOLUTION INTRAVENOUS CONTINUOUS
Status: DISCONTINUED | OUTPATIENT
Start: 2022-02-25 | End: 2022-02-28

## 2022-02-25 RX ADMIN — ASPIRIN 325 MG ORAL TABLET 325 MG: 325 PILL ORAL at 22:52

## 2022-02-25 NOTE — Clinical Note
LCA Cine(s)  injected utilizing power injector system. 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 with tap block    Findings: 8 cm thick walled cyst sitting in the mesentery and retroperitoneum lateral and posterior to the descending colon and adhesed to the gerotas fascia of the superior pole left kidney. Removed intact. Completely separate from bowel wall. All bowel appeared viable after excision of the mass. No major mesenteric blood vessels required resection. Estimated Blood Loss: Less than 10 cc    Complications: None    Specimens Removed: Cystic mass    Indication: 75-year-old female presents with left flank discomfort. A CT scan revealed a large cystic mass in the left upper quadrant. This was aspirated and there was some mucinous findings within the specimen although everything appeared benign. 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 Given symptoms and size he recommended excision. We discussed a laparoscopic possible robotic approach     Procedure: Patient underwent a formal bowel prep at home due to anticipation of possible colon resection. She was on enhanced recovery program and received Entereg preop. I met with her in the preoperative area with her family answering any last minute questions. She was brought to the operating room and underwent general anesthetic without complication. Tap block placed by anesthesia without complication. Nguyen catheter inserted under sterile condition by the OR nurse without complication. Patient was placed in [facility] in lithotomy position. SCDs on lower extremities and all pressure points padded. Arms were tucked to her side.     I proceeded with rigid sigmoidoscopy to 20 cm to wash out the rectum with Betadine for possible future anastomosis to this area. No  pathology was noted. Patient's abdomen groin perineum and rectum were then prepped draped in a sterile fashion. Given potential use of the robot I place my ports as I would for the robotic port sites. I entered the abdomen in the right upper quadrant varies needle and insufflated to 15 mmHg pressure with CO2. Using a 5 millimeter Optiview trocar then entered the abdomen in this location without complication. Looked around and no bleeding or injury noted. I then proceeded with placement of 4 robotic trochars 8 mm in size starting in the epigastrium, right upper quadrant, right paraumbilical, and right lower quadrant adjacent to the anterior superior iliac spine. Patient was placed in a steep Trendelenburg position with the tip to the patient's right side. The omentum was elevated over the stomach. I could not immediately visualized the cyst. The descending colon was grasped pulled medially while I mobilized the peritoneal reflection. So the cyst became apparent. This was thick-walled and I was able to grasp the cyst and take a LigaSure and start to mobilize. The cyst was sitting in the mesenteric tissues as well as within some of the retroperitoneal tissues. We were able to completely remove this intact. It appeared to be adhesed to some gerotas fascia at the upper pole of the kidney. However with the LigaSure we able to completely separate this and remove the cyst intact. We continued to divide all attachments until the cyst was completely free. There was no single large blood supply. There was no bowel wall involvement. Once the cyst was freely large Endobag was placed which the cyst was inserted. I planned removal through the right lower quadrant port site. I increased this to a 12 mm port.  Eventually I had to open an incision about 5 cm and incise some of the anterior rectus sheath and muscle in order to remove the specimen. With the specimen removed I carefully closed the right lower quadrant incision. Peritoneum was visualized and grasped and closed with running #1 Vicryl suture. I copiously irrigated. There was some oozing in the muscle. This was controlled both with cautery and FloSeal hemostatic agent. Once complete hemostasis noted the anterior fascia was closed with #1 Vicryl using figure-of-eight technique Ã3. Subcutaneous taste tissues irrigated and Ariana's fascia closed with interrupted 2-0 Vicryl suture. Subdermal tissues closed with interrupted 3-0 Vicryl suture. Subsequently skin incision closed with 4-0 Monocryl. I reestablished pneumoperitoneum and carefully evaluated the area of resection. There was a little bit of serous fluid noted but no bleeding appreciated. I closely irrigated with a liter fluid aspirated dry. I ensured all bowel was placed back into proper anatomic position and omentum was laid back in its proper location. Ports removed under direct vision and no bleeding seen. Although pneumoperitoneum was fully released. All trocar sites were closed at the skin with 4-0 Monocryl subcuticular technique. Dermabond glue applied over all incisions.     Nguyen catheter removed and patient awakened, extubated, brought to recovery in stable condition at the end of the procedure    Kelly Wade MD

## 2022-02-25 NOTE — Clinical Note
The first balloon was inserted into the left anterior descending and middle left anterior descending.Max pressure = 12 mino. Total duration = 15 seconds.     Max pressure = 16 mino. Total duration = 20 seconds.    Balloon reinflated a second time: Max pressure = 16 mino. Total duration = 20 seconds.

## 2022-02-25 NOTE — Clinical Note
The first balloon was inserted into the left anterior descending and middle left anterior descending.Max pressure = 14 mino. Total duration = 20 seconds.     Max pressure = 16 mino. Total duration = 15 seconds.    Balloon reinflated a second time: Max pressure = 16 mino. Total duration = 15 seconds.  Balloon reinflated a third time: Max pressure = 16 mino. Total duration = 15 seconds.  Balloon reinflated a fourth time: Max pressure = 16 mino. Total duration = 20 seconds.  Balloon reinflated a fourth time: Max pressure = 18 mino. Total duration = 15 seconds.

## 2022-02-25 NOTE — Clinical Note
The first balloon was inserted into the left anterior descending and middle left anterior descending.Max pressure = 14 mino. Total duration = 15 seconds.     Max pressure = 14 mino. Total duration = 10 seconds.    Balloon reinflated a second time: Max pressure = 14 mino. Total duration = 10 seconds.  Balloon reinflated a third time: Max pressure = 14 mino. Total duration = 15 seconds.  Balloon reinflated a fourth time: Max pressure = 14 mino. Total duration = 15 seconds.

## 2022-02-25 NOTE — Clinical Note
Stent deployed in the middle left anterior descending. Max pressure = 11 mino. Total duration = 20 seconds.

## 2022-02-26 ENCOUNTER — APPOINTMENT (OUTPATIENT)
Dept: CARDIOLOGY | Facility: CLINIC | Age: 62
DRG: 247 | End: 2022-02-26
Attending: INTERNAL MEDICINE
Payer: COMMERCIAL

## 2022-02-26 LAB
ATRIAL RATE - MUSE: 79 BPM
CHOLEST SERPL-MCNC: 196 MG/DL
DIASTOLIC BLOOD PRESSURE - MUSE: NORMAL MMHG
HDLC SERPL-MCNC: 67 MG/DL
INTERPRETATION ECG - MUSE: NORMAL
LDLC SERPL CALC-MCNC: 83 MG/DL
LVEF ECHO: NORMAL
NONHDLC SERPL-MCNC: 129 MG/DL
P AXIS - MUSE: 38 DEGREES
POTASSIUM BLD-SCNC: 3.7 MMOL/L (ref 3.4–5.3)
PR INTERVAL - MUSE: 156 MS
QRS DURATION - MUSE: 92 MS
QT - MUSE: 372 MS
QTC - MUSE: 426 MS
R AXIS - MUSE: 17 DEGREES
SARS-COV-2 RNA RESP QL NAA+PROBE: NEGATIVE
SYSTOLIC BLOOD PRESSURE - MUSE: NORMAL MMHG
T AXIS - MUSE: 48 DEGREES
TRIGL SERPL-MCNC: 232 MG/DL
TROPONIN I SERPL HS-MCNC: 68 NG/L
TROPONIN I SERPL HS-MCNC: 71 NG/L
UFH PPP CHRO-ACNC: 0.17 IU/ML
UFH PPP CHRO-ACNC: 0.28 IU/ML
UFH PPP CHRO-ACNC: 0.29 IU/ML
VENTRICULAR RATE- MUSE: 79 BPM

## 2022-02-26 PROCEDURE — 99207 PR SC NO CHARGE VISIT: CPT | Performed by: INTERNAL MEDICINE

## 2022-02-26 PROCEDURE — 258N000003 HC RX IP 258 OP 636: Performed by: EMERGENCY MEDICINE

## 2022-02-26 PROCEDURE — 87635 SARS-COV-2 COVID-19 AMP PRB: CPT | Performed by: EMERGENCY MEDICINE

## 2022-02-26 PROCEDURE — 84484 ASSAY OF TROPONIN QUANT: CPT | Performed by: INTERNAL MEDICINE

## 2022-02-26 PROCEDURE — 258N000003 HC RX IP 258 OP 636: Performed by: INTERNAL MEDICINE

## 2022-02-26 PROCEDURE — 36415 COLL VENOUS BLD VENIPUNCTURE: CPT | Performed by: INTERNAL MEDICINE

## 2022-02-26 PROCEDURE — 250N000013 HC RX MED GY IP 250 OP 250 PS 637: Performed by: INTERNAL MEDICINE

## 2022-02-26 PROCEDURE — C9803 HOPD COVID-19 SPEC COLLECT: HCPCS

## 2022-02-26 PROCEDURE — 999N000208 ECHOCARDIOGRAM COMPLETE

## 2022-02-26 PROCEDURE — 210N000001 HC R&B IMCU HEART CARE

## 2022-02-26 PROCEDURE — 93306 TTE W/DOPPLER COMPLETE: CPT | Mod: 26 | Performed by: INTERNAL MEDICINE

## 2022-02-26 PROCEDURE — 85520 HEPARIN ASSAY: CPT | Performed by: INTERNAL MEDICINE

## 2022-02-26 PROCEDURE — 255N000002 HC RX 255 OP 636: Performed by: INTERNAL MEDICINE

## 2022-02-26 PROCEDURE — 96376 TX/PRO/DX INJ SAME DRUG ADON: CPT

## 2022-02-26 PROCEDURE — 96365 THER/PROPH/DIAG IV INF INIT: CPT

## 2022-02-26 PROCEDURE — 99222 1ST HOSP IP/OBS MODERATE 55: CPT | Mod: 25 | Performed by: INTERNAL MEDICINE

## 2022-02-26 PROCEDURE — 250N000011 HC RX IP 250 OP 636: Performed by: EMERGENCY MEDICINE

## 2022-02-26 PROCEDURE — 84132 ASSAY OF SERUM POTASSIUM: CPT | Performed by: INTERNAL MEDICINE

## 2022-02-26 PROCEDURE — 99232 SBSQ HOSP IP/OBS MODERATE 35: CPT | Performed by: INTERNAL MEDICINE

## 2022-02-26 PROCEDURE — 250N000011 HC RX IP 250 OP 636: Performed by: INTERNAL MEDICINE

## 2022-02-26 PROCEDURE — 96366 THER/PROPH/DIAG IV INF ADDON: CPT

## 2022-02-26 RX ORDER — ASPIRIN 81 MG/1
324 TABLET, CHEWABLE ORAL ONCE
Status: DISCONTINUED | OUTPATIENT
Start: 2022-02-26 | End: 2022-02-27

## 2022-02-26 RX ORDER — POTASSIUM CHLORIDE 1500 MG/1
20 TABLET, EXTENDED RELEASE ORAL ONCE
Status: COMPLETED | OUTPATIENT
Start: 2022-02-26 | End: 2022-02-26

## 2022-02-26 RX ORDER — AMOXICILLIN 250 MG
2 CAPSULE ORAL 2 TIMES DAILY PRN
Status: DISCONTINUED | OUTPATIENT
Start: 2022-02-26 | End: 2022-02-28

## 2022-02-26 RX ORDER — ONDANSETRON 2 MG/ML
4 INJECTION INTRAMUSCULAR; INTRAVENOUS EVERY 6 HOURS PRN
Status: DISCONTINUED | OUTPATIENT
Start: 2022-02-26 | End: 2022-02-28

## 2022-02-26 RX ORDER — AMOXICILLIN 250 MG
1 CAPSULE ORAL 2 TIMES DAILY PRN
Status: DISCONTINUED | OUTPATIENT
Start: 2022-02-26 | End: 2022-02-28

## 2022-02-26 RX ORDER — SODIUM CHLORIDE 9 MG/ML
INJECTION, SOLUTION INTRAVENOUS CONTINUOUS
Status: DISCONTINUED | OUTPATIENT
Start: 2022-02-26 | End: 2022-02-26

## 2022-02-26 RX ORDER — ONDANSETRON 4 MG/1
4 TABLET, ORALLY DISINTEGRATING ORAL EVERY 6 HOURS PRN
Status: DISCONTINUED | OUTPATIENT
Start: 2022-02-26 | End: 2022-02-28

## 2022-02-26 RX ORDER — ACETAMINOPHEN 650 MG/1
650 SUPPOSITORY RECTAL EVERY 6 HOURS PRN
Status: DISCONTINUED | OUTPATIENT
Start: 2022-02-26 | End: 2022-02-28

## 2022-02-26 RX ORDER — ACETAMINOPHEN 325 MG/1
650 TABLET ORAL EVERY 6 HOURS PRN
Status: DISCONTINUED | OUTPATIENT
Start: 2022-02-26 | End: 2022-02-28

## 2022-02-26 RX ORDER — ASPIRIN 81 MG/1
81 TABLET, CHEWABLE ORAL DAILY
Status: DISCONTINUED | OUTPATIENT
Start: 2022-02-27 | End: 2022-02-28

## 2022-02-26 RX ORDER — LIDOCAINE 40 MG/G
CREAM TOPICAL
Status: DISCONTINUED | OUTPATIENT
Start: 2022-02-26 | End: 2022-02-28

## 2022-02-26 RX ORDER — LISINOPRIL 10 MG/1
10 TABLET ORAL DAILY
Status: DISCONTINUED | OUTPATIENT
Start: 2022-02-26 | End: 2022-02-28

## 2022-02-26 RX ADMIN — SIMVASTATIN 60 MG: 20 TABLET, FILM COATED ORAL at 21:06

## 2022-02-26 RX ADMIN — METOPROLOL TARTRATE 12.5 MG: 25 TABLET, FILM COATED ORAL at 08:49

## 2022-02-26 RX ADMIN — SODIUM CHLORIDE: 9 INJECTION, SOLUTION INTRAVENOUS at 04:28

## 2022-02-26 RX ADMIN — HUMAN ALBUMIN MICROSPHERES AND PERFLUTREN 6 ML: 10; .22 INJECTION, SOLUTION INTRAVENOUS at 07:59

## 2022-02-26 RX ADMIN — METOPROLOL TARTRATE 12.5 MG: 25 TABLET, FILM COATED ORAL at 21:06

## 2022-02-26 RX ADMIN — LISINOPRIL 10 MG: 10 TABLET ORAL at 21:06

## 2022-02-26 RX ADMIN — HEPARIN SODIUM 750 UNITS/HR: 1000 INJECTION INTRAVENOUS; SUBCUTANEOUS at 00:14

## 2022-02-26 RX ADMIN — HEPARIN SODIUM 900 UNITS/HR: 1000 INJECTION INTRAVENOUS; SUBCUTANEOUS at 13:32

## 2022-02-26 RX ADMIN — POTASSIUM CHLORIDE 20 MEQ: 1500 TABLET, EXTENDED RELEASE ORAL at 03:37

## 2022-02-26 ASSESSMENT — ACTIVITIES OF DAILY LIVING (ADL)
ADLS_ACUITY_SCORE: 12
ADLS_ACUITY_SCORE: 14
ADLS_ACUITY_SCORE: 12
ADLS_ACUITY_SCORE: 12
ADLS_ACUITY_SCORE: 14
ADLS_ACUITY_SCORE: 12
ADLS_ACUITY_SCORE: 14
ADLS_ACUITY_SCORE: 12
ADLS_ACUITY_SCORE: 14
ADLS_ACUITY_SCORE: 12
ADLS_ACUITY_SCORE: 14
ADLS_ACUITY_SCORE: 12

## 2022-02-26 ASSESSMENT — ENCOUNTER SYMPTOMS
ABDOMINAL PAIN: 0
FEVER: 0
SHORTNESS OF BREATH: 0

## 2022-02-26 NOTE — PROGRESS NOTES
"[facility]    Internal Medicine Hospitalist Progress Note  02/26/2022  I evaluated patient on the above date.    Papa Cortez Jr., MD  [phone] (p)  Text Page  Vocera        Assessment & Plan New actions/orders today (02/26/2022) are underlined.    Rubi Nicolas is a 61 year old female with history including DM2, HTN and DLD, mathieu presented 2/25/2022 with chest pain and found with trop elevation.    NSTEMI  Hypertension (benign essential).  [PTA: lisinopril 5 mg daily.]  * Presented 2/25 with intermittent chest pain over last day or so PTA, lasting seconds. On initial evaluation 2/25, was mildly hypertensive; ECG showed SR w/o acute ischemic changes; trop 77. CXR 2/25 negative. Started on heparin gtt and metoprolol an admit. Continued on ASA and simvastatin on admit. Cardiology consulted on admit.  * Follow-up trops \"flat\". Echo 2/26 showed LVEF 60-65%, no regional wall motion abnormalities; RV OK.  Recent Labs   Lab 02/26/22  0704 02/26/22  0410 02/25/22 1857   TROPONINIS 68* 71* 77*   - Continue heparin gtt.  - Continue ASA, simvastatin.  - Continue lisinopril 10 mg daily; metoprolol 12.5 mg BID.  - Cardiology consulted, appreciate help.    DM II.  [PTA: glimepiride 6 mg daily, metformin 1000 mg BID.]  * Most recent A1C 6.8% 8/2021.   Recent Labs   Lab 02/25/22 1857   *   - Continue carb controlled diet.  - Monitor glucose levels.  - Hold PTA glimepiride and metformin for now.    DLD.  * PTA on simvastatin.  - Continue simvastatin.  - Check FLP in am.    Hypokalemia.  * Potassium low on admit 2/25. Treated with replacement.  Recent Labs   Lab 02/26/22  0410 02/25/22 1857   POTASSIUM 3.7 3.3*   - Continue PRN K replacement.    GERD.  * Chronic and stable.  - Continue omeprazole/pantoprazole.    Clinically Significant Risk Factors Present on Admission               # Platelet Defect: home medication list includes an antiplatelet medication   # Overweight: Estimated body " "mass index is 25.79 kg/m  as calculated from the following:    Height as of 2/14/22: 1.575 m (5' 2\").    Weight as of this encounter: 64 kg (141 lb).        COVID-19 testing.  COVID-19 PCR Results    COVID-19 PCR Results 2/11/22 2/26/22   SARS CoV2 PCR Negative Negative      Comments are available for some flowsheets but are not being displayed.         COVID-19 Antibody Results, Testing for Immunity    COVID-19 Antibody Results, Testing for Immunity   No data to display.             Diet: Low Fat Diet    Prophylaxis: PCD's, ambulation. On heparin gtt.  Castro Catheter: Not present  Central Lines: None  Code Status: Full Code    Disposition Plan   Expected discharge: 1-2d recommended to prior living arrangement pending cardiac workup.  Entered: Papa Cortez MD 02/26/2022, 1:10 PM         Interval History   Feeling OK.  No chest pain.  No dyspnea, N/V.    -Data reviewed today: I reviewed all new labs and imaging over the last 24 hours. I personally reviewed the EKG tracing showing findings as above (on admit).    Physical Exam    , Blood pressure 133/77, pulse 73, temperature 98  F (36.7  C), temperature source Oral, resp. rate 18, weight 64 kg (141 lb), SpO2 97 %, not currently breastfeeding.  Vitals:    02/25/22 2300 02/26/22 0452   Weight: 63.5 kg (140 lb) 64 kg (141 lb)     Vital Signs with Ranges  Temp:  [97  F (36.1  C)-98.1  F (36.7  C)] 98  F (36.7  C)  Pulse:  [71-84] 73  Resp:  [14-19] 18  BP: (133-163)/(77-89) 133/77  SpO2:  [96 %-100 %] 97 %  Patient Vitals for the past 24 hrs:   BP Temp Temp src Pulse Resp SpO2 Weight   02/26/22 0901 133/77 98  F (36.7  C) Oral 73 18 97 % --   02/26/22 0452 -- -- -- -- -- -- 64 kg (141 lb)   02/26/22 0200 (!) 141/89 98.1  F (36.7  C) Oral 84 18 98 % --   02/26/22 0100 135/80 -- -- 71 19 96 % --   02/25/22 2300 (!) 148/82 -- -- 76 18 96 % 63.5 kg (140 lb)   02/25/22 1850 (!) 163/89 97  F (36.1  C) Temporal 79 14 100 % --     I/O's Last 24 hours  No intake/output " data recorded.    Constitutional: Awake, alert, pleasant.  Respiratory: Diminished in bases. No crackles or wheezes.  Cardiovascular: RRR, no m/r/g.  GI: Soft, nt, nd, +BS.  Skin/Integumen: No bilateral lower extremity edema.  Other:        Data   Recent Labs   Lab 22  0704 22  0410 22   WBC  --   --  7.2   HGB  --   --  12.9   MCV  --   --  89   PLT  --   --  275   NA  --   --  134   POTASSIUM  --  3.7 3.3*   CHLORIDE  --   --  102   CO2  --   --  27   BUN  --   --  14   CR  --   --  0.67   ANIONGAP  --   --  5   CHRISTOPHER  --   --  9.5   GLC  --   --  132*   ALBUMIN  --   --  4.0   PROTTOTAL  --   --  7.7   BILITOTAL  --   --  0.3   ALKPHOS  --   --  42   ALT  --   --  33   AST  --   --  17   TROPONINIS 68* 71* 77*     Recent Labs   Lab Test 22  1857 20  0841 20  0829 19  0900 18  0846   * 189* 199* 154* 158*     Recent Labs   Lab 22   WBC 7.2         Recent Results (from the past 24 hour(s))   Chest XR,  PA & LAT    Narrative    EXAM: XR CHEST 2 VW  LOCATION: [facility]  DATE/TIME: 2022 10:40 PM    INDICATION: chest pain  COMPARISON: None.      Impression    IMPRESSION: Heart size is normal. Lungs are clear. No pneumothorax or pleural effusion.   Echocardiogram Complete   Result Value    LVEF  60-65%    Narrative    [id number]  [id number]  [id number]  [id number]^BAYRON^MAITE^SALLY     [facility]  Echocardiography Laboratory  [facility] [place] [address]     Name: MURRAY MILES  MRN: [id number]  : 1960  Study Date: 2022 07:57 AM  Age: 61 yrs  Gender: Female  Patient Location: [facility]  Reason For Study: Chest Pain, Chest Tightness, Chest Pressure  Ordering Physician: MAITE VERMA  Referring Physician: Luz Maria Demarco PA-C  Performed By: Kathrine Newberry     BSA: 1.6 m2  Height: 62 in  Weight: 140 lb  HR: 71  BP: 141/89  mmHg  ______________________________________________________________________________  Procedure  Complete Portable Echo Adult. Optison (NDC #[id number]) given intravenously.  ______________________________________________________________________________  Interpretation Summary     The visual ejection fraction is 60-65%.  The left ventricle is normal in structure, function and size.  No regional wall motion abnormalities noted.  The right ventricle is normal in structure, function and size.  There is no pericardial effusion.  ______________________________________________________________________________  Left Ventricle  The left ventricle is normal in structure, function and size. There is normal  left ventricular wall thickness. The visual ejection fraction is 60-65%. Left  ventricular diastolic function is indeterminate. No regional wall motion  abnormalities noted.     Right Ventricle  The right ventricle is normal in structure, function and size.     Atria  Normal left atrial size. Right atrial size is normal.     Mitral Valve  There is trace mitral regurgitation.     Tricuspid Valve  No tricuspid regurgitation.     Aortic Valve  No aortic regurgitation is present.     Pulmonic Valve  The pulmonic valve is not well visualized.     Vessels  The aortic root is normal size. Normal size ascending aorta. The inferior vena  cava is normal.     Pericardium  There is no pericardial effusion.     ______________________________________________________________________________  MMode/2D Measurements & Calculations  IVSd: 0.87 cm  LVIDd: 4.3 cm  LVIDs: 2.6 cm  LVPWd: 0.90 cm  FS: 40.9 %  LV mass(C)d: 122.1 grams  LV mass(C)dI: 74.3 grams/m2     Ao root diam: 3.0 cm  LA dimension: 3.6 cm  asc Aorta Diam: 3.2 cm  LA/Ao: 1.2  LA Volume (BP): 39.9 ml  LA Volume Index (BP): 24.3 ml/m2  RWT: 0.42     Doppler Measurements & Calculations  MV E max jay: 64.1 cm/sec  MV A max jay: 68.7 cm/sec  MV E/A: 0.93  MV dec slope: 322.4 cm/sec2      Ao V2 max: 143.5 cm/sec  Ao max P.0 mmHg  Ao V2 mean: 99.9 cm/sec  Ao mean P.5 mmHg  Ao V2 VTI: 32.7 cm  LV V1 max P.2 mmHg  LV V1 max: 114.4 cm/sec  LV V1 VTI: 24.8 cm  PI end-d donte: 110.8 cm/sec  AV Donte Ratio (DI): 0.80  E/E' av.1  Lateral E/e': 7.5  Medial E/e': 8.8     ______________________________________________________________________________  Report approved by: CANDICE Camacho 2022 10:33 AM             Medications   All medications were reviewed.    Continuing ACE inhibitor/ARB/ARNI from home medication list OR ACE inhibitor/ARB order already placed during this visit       - MEDICATION INSTRUCTIONS -       heparin 750 Units/hr (22 0950)     - MEDICATION INSTRUCTIONS -       - MEDICATION INSTRUCTIONS -       sodium chloride 100 mL/hr at 22 0428       aspirin  324 mg Oral Once     [START ON 2022] aspirin  81 mg Oral Daily     lisinopril  10 mg Oral Daily     metoprolol tartrate  12.5 mg Oral BID     simvastatin  60 mg Oral At Bedtime     sodium chloride (PF)  3 mL Intracatheter Q8H     acetaminophen **OR** acetaminophen, Continuing ACE inhibitor/ARB/ARNI from home medication list OR ACE inhibitor/ARB order already placed during this visit, - MEDICATION INSTRUCTIONS -, HOLD MEDICATION, lidocaine 4%, lidocaine (buffered or not buffered), magnesium hydroxide, - MEDICATION INSTRUCTIONS -, melatonin, ondansetron **OR** ondansetron, - MEDICATION INSTRUCTIONS -, senna-docusate **OR** senna-docusate, sodium chloride (PF)

## 2022-02-26 NOTE — PLAN OF CARE
Goal Outcome Evaluation:    Plan of Care Reviewed With: patient     Pt denies chest pain or SOB, up independently, good PO intake, Heparin gtt 900 units/hr, Tele SR

## 2022-02-26 NOTE — CONSULTS
[facility]    Cardiology Consultation     Date of Admission:  2/25/2022    Assessment & Plan   Rubi Nicolas is a 61 year old female who was admitted on 2/25/2022. I was asked to see the patient for NSTEMI.    Patient had a 1 day history of chest pressure.  She has multiple risk factors for coronary disease including hypertension hyperlipidemia, type 2 diabetes.    She was found to have an elevated troponin with a peak of 77.  She been placed on heparin.  She is chest pain-free.  She has no known cardiac history.    EKG does not have ischemic changes.    Problems:  NSTEMI  Hypertension  Hyperlipidemia  Type 2 diabetes    Plan:  Agree with heparin infusion    Echocardiogram notes a normal LVEF with no regional wall motion abnormalities    Continue aspirin, metoprolol, lisinopril, simvastatin    Given the patient's risk factor profile and the fact that she is here with an NSTEMI we should proceed with a coronary angiogram.  This will likely be on Monday.  I discussed the risk and benefits of this.  She is keen to proceed.  Please contact cardiology if there is any decompensation or recurrent chest pain for the patient.  We may need to do the angiogram sooner if she has decompensation.            Dexter Garrido MD     Code Status    Full Code    Reason for Consult   Reason for consult: Non-STEMI    Primary Care Physician   Luz Mraia Demarco    Chief Complaint   Chest pain    History is obtained from the patient    History of Present Illness   Rubi Nicolas is a 61 year old female who was admitted on 2/25/2022. I was asked to see the patient for NSTEMI.    Patient had a 1 day history of chest pressure.  She has multiple risk factors for coronary disease including hypertension hyperlipidemia, type 2 diabetes.    She was found to have an elevated troponin with a peak of 77.  She been placed on heparin.  She is chest pain-free.    EKG does not have ischemic changes.      Past  Medical History   I have reviewed this patient's medical history and updated it with pertinent information if needed.   Past Medical History:   Diagnosis Date     Atypical squamous cells of undetermined significance (ASCUS) on Papanicolaou smear of cervix 10/9/2015     GERD (gastroesophageal reflux disease)      HTN (hypertension)      Hyperlipidemia      Type 2 diabetes mellitus without complications (H) 10/22/2015       Past Surgical History   I have reviewed this patient's surgical history and updated it with pertinent information if needed.  Past Surgical History:   Procedure Laterality Date     APPENDECTOMY  1976    at time of USO     ARTHROSCOPY KNEE RT/LT       COLONOSCOPY N/A 11/9/2015    Procedure: COLONOSCOPY;  Surgeon: Kate Redmond MD;  Location:  GI     COLONOSCOPY N/A 2/14/2022    Procedure: COLONOSCOPY;  Surgeon: Kate Redmond MD;  Location: [facility]     Z LAP OVARIAN CYSTOTOMY  1976    salpingooopherectomy for large dermoid       Prior to Admission Medications   Prior to Admission Medications   Prescriptions Last Dose Informant Patient Reported? Taking?   Continuous Blood Gluc  (FREESTYLE ADRIANA 14 DAY READER) ROSALIE   No No   Sig: USE 1 DEVICE CONTINUOUSLY   Continuous Blood Gluc Sensor (FREESTYLE ADRIANA 14 DAY SENSOR) [facility] [place] [facility]   No No   Sig: USE ONE EVERY 14 DAYS   Multiple Vitamin (MULTIVITAMIN OR) 2/25/2022 at 1800 Self Yes Yes   Sig: Take 1 tablet by mouth daily.   aspirin 81 MG tablet 2/25/2022 at 1800 Self Yes Yes   Sig: Take 1 tablet (81 mg) by mouth daily   glimepiride (AMARYL) 2 MG tablet 2/25/2022 at AM Self No Yes   Sig: TAKE 3 TABLETS (6 MG) BY MOUTH EVERY MORNING (BEFORE BREAKFAST)   lisinopril (ZESTRIL) 10 MG tablet 2/25/2022 at 1800 Self No Yes   Sig: Take 1 tablet (10 mg) by mouth daily   Patient taking differently: Take 5 mg by mouth daily    metFORMIN (GLUCOPHAGE) 1000 MG tablet 2/25/2022 at X 2 doses Self No Yes   Sig: TAKE 1 TABLET BY MOUTH TWICE A DAY WITH MEALS    omeprazole (PRILOSEC) 20 MG DR capsule Past Week at Unknown time Self Yes Yes   Sig: Take 20 mg by mouth twice a week Monday, Thursday   simvastatin (ZOCOR) 40 MG tablet 2/25/2022 at 1800 Self No Yes   Sig: TAKE 1 AND 1/2 TABLETS BY MOUTH DAILY. FILL 8-21-20      Facility-Administered Medications: None     Allergies   Allergies   Allergen Reactions     Keflex [Cephalexin Hcl] Hives     Penicillins Hives     Ragweeds      Sneezing etc.       Social History   I have reviewed this patient's social history and updated it with pertinent information if needed. Rubi Nicolas  reports that she has never smoked. She has never used smokeless tobacco. She reports that she does not drink alcohol and does not use drugs.    Family History   I have reviewed this patient's family history and updated it with pertinent information if needed.   Family History   Problem Relation Age of Onset     Diabetes Mother         type 2     Hypertension Mother      Dementia Mother 89     Hypertension Father      Cerebrovascular Disease Father      Cancer Father 68        brain ca     Diabetes Maternal Uncle         type 1       Review of Systems   The 12 point Review of Systems is negative other than noted in the HPI or here.     Physical Exam   Temp: 98  F (36.7  C) Temp src: Oral BP: 133/77 Pulse: 73   Resp: 18 SpO2: 97 %      Vital Signs with Ranges  Temp:  [97  F (36.1  C)-98.1  F (36.7  C)] 98  F (36.7  C)  Pulse:  [71-84] 73  Resp:  [14-19] 18  BP: (133-163)/(77-89) 133/77  SpO2:  [96 %-100 %] 97 %  141 lbs 0 oz    GENERAL APPEARANCE: Alert and oriented x3. No acute distress.  SKIN: Inspection of the skin reveals no rashes, ulcerations, warm, dry  NECK: Supple and symmetric.   Normal JVP  LUNGS: Clear breath sounds throughout bilaterally, no wheezes, no rhonchi  CARDIOVASCULAR: S1, S2, regular rate and rhythm without any murmurs, gallops, rubs. The carotid pulses were normal and 2+ bilaterally without bruits. Peripheral pulses  were 2+ and symmetric.  ABDOMEN: Soft, non-tender, non-distended with normal bowel sounds.  No ascites noted.  EXTREMITIES: No edema.  NEUROLOGIC:  Normal mood and affect.  Sensation to touch was normal.      Data   Results for orders placed or performed during the hospital encounter of 02/25/22 (from the past 24 hour(s))   CBC (+ platelets, no diff)   Result Value Ref Range    WBC Count 7.2 4.0 - 11.0 10e3/uL    RBC Count 4.27 3.80 - 5.20 10e6/uL    Hemoglobin 12.9 11.7 - 15.7 g/dL    Hematocrit 38.1 35.0 - 47.0 %    MCV 89 78 - 100 fL    MCH 30.2 26.5 - 33.0 pg    MCHC 33.9 31.5 - 36.5 g/dL    RDW 12.5 10.0 - 15.0 %    Platelet Count 275 150 - 450 10e3/uL   Comprehensive metabolic panel   Result Value Ref Range    Sodium 134 133 - 144 mmol/L    Potassium 3.3 (L) 3.4 - 5.3 mmol/L    Chloride 102 94 - 109 mmol/L    Carbon Dioxide (CO2) 27 20 - 32 mmol/L    Anion Gap 5 3 - 14 mmol/L    Urea Nitrogen 14 7 - 30 mg/dL    Creatinine 0.67 0.52 - 1.04 mg/dL    Calcium 9.5 8.5 - 10.1 mg/dL    Glucose 132 (H) 70 - 99 mg/dL    Alkaline Phosphatase 42 40 - 150 U/L    AST 17 0 - 45 U/L    ALT 33 0 - 50 U/L    Protein Total 7.7 6.8 - 8.8 g/dL    Albumin 4.0 3.4 - 5.0 g/dL    Bilirubin Total 0.3 0.2 - 1.3 mg/dL    GFR Estimate >90 >60 mL/min/1.73m2   Troponin I (now)   Result Value Ref Range    Troponin I High Sensitivity 77 (H) <54 ng/L   Lipid panel reflex to direct LDL   Result Value Ref Range    Cholesterol 196 <200 mg/dL    Triglycerides 232 (H) <150 mg/dL    Direct Measure HDL 67 >=50 mg/dL    LDL Cholesterol Calculated 83 <=100 mg/dL    Non HDL Cholesterol 129 <130 mg/dL    Narrative    Cholesterol  Desirable:  <200 mg/dL    Triglycerides  Normal:  Less than 150 mg/dL  Borderline High:  150-199 mg/dL  High:  200-499 mg/dL  Very High:  Greater than or equal to 500 mg/dL    Direct Measure HDL  Female:  Greater than or equal to 50 mg/dL   Male:  Greater than or equal to 40 mg/dL    LDL Cholesterol  Desirable:   <100mg/dL  Above Desirable:  100-129 mg/dL   Borderline High:  130-159 mg/dL   High:  160-189 mg/dL   Very High:  >= 190 mg/dL    Non HDL Cholesterol  Desirable:  130 mg/dL  Above Desirable:  130-159 mg/dL  Borderline High:  160-189 mg/dL  High:  190-219 mg/dL  Very High:  Greater than or equal to 220 mg/dL   EKG 12 lead   Result Value Ref Range    Systolic Blood Pressure  mmHg    Diastolic Blood Pressure  mmHg    Ventricular Rate 79 BPM    Atrial Rate 79 BPM    CA Interval 156 ms    QRS Duration 92 ms     ms    QTc 426 ms    P Axis 38 degrees    R AXIS 17 degrees    T Axis 48 degrees    Interpretation ECG       Sinus rhythm  Normal ECG  No previous ECGs available  Confirmed by GENERATED REPORT, COMPUTER ([id number]),  PEGGY PASTOR ([id number]) on 2/26/2022 7:35:24 AM     Chest XR,  PA & LAT    Narrative    EXAM: XR CHEST 2 VW  LOCATION: [facility]  DATE/TIME: 2/25/2022 10:40 PM    INDICATION: chest pain  COMPARISON: None.      Impression    IMPRESSION: Heart size is normal. Lungs are clear. No pneumothorax or pleural effusion.   Asymptomatic COVID-19 Virus (Coronavirus) by PCR Nasopharyngeal    Specimen: Nasopharyngeal; Swab   Result Value Ref Range    SARS CoV2 PCR Negative Negative    Narrative    Testing was performed using the en  SARS-CoV-2 & Influenza A/B Assay on the en  Merna  System.  This test should be ordered for the detection of SARS-COV-2 in individuals who meet SARS-CoV-2 clinical and/or epidemiological criteria. Test performance is unknown in asymptomatic patients.  This test is for in vitro diagnostic use under the FDA EUA for laboratories certified under CLIA to perform moderate and/or high complexity testing. This test has not been FDA cleared or approved.  A negative test does not rule out the presence of PCR inhibitors in the specimen or target RNA in concentration below the limit of detection for the assay. The possibility of a false negative should be  considered if the patient's recent exposure or clinical presentation suggests COVID-19.  [facility] are certified under the Clinical Laboratory Improvement Amendments of 1988 (CLIA-88) as qualified to perform moderate and/or high complexity laboratory testing.   Troponin I   Result Value Ref Range    Troponin I High Sensitivity 71 (H) <54 ng/L   Potassium   Result Value Ref Range    Potassium 3.7 3.4 - 5.3 mmol/L   Heparin Unfractionated Anti Xa Level   Result Value Ref Range    Anti Xa Unfractionated Heparin 0.29 For Reference Range, See Comment IU/mL    Narrative    Therapeutic Range: UFH: 0.25-0.50 IU/mL for low intensity dosing,  0.30-0.70 IU/mL for high intensity dosing DVT and PE.  This test is not validated for other direct factor X inhibitors (e.g. rivaroxaban, apixaban, edoxaban, betrixaban, fondaparinux) and should not be used for monitoring of other medications.   Troponin I   Result Value Ref Range    Troponin I High Sensitivity 68 (H) <54 ng/L   Echocardiogram Complete   Result Value Ref Range    LVEF  60-65%     Narrative    [id number]  [id number]  [id number]  [id number]^BAYRON^MAITE^SALLY     [facility]  Echocardiography Laboratory  [facility] [place] [address]     Name: MURRAY MILES  MRN: [id number]  : 1960  Study Date: 2022 07:57 AM  Age: 61 yrs  Gender: Female  Patient Location: [facility]  Reason For Study: Chest Pain, Chest Tightness, Chest Pressure  Ordering Physician: MAITE VERMA  Referring Physician: Luz Maria Demarco PA-C  Performed By: Kathrine Newberry     BSA: 1.6 m2  Height: 62 in  Weight: 140 lb  HR: 71  BP: 141/89 mmHg  ______________________________________________________________________________  Procedure  Complete Portable Echo Adult. Optison (NDC #[id number]) given intravenously.  ______________________________________________________________________________  Interpretation Summary     The visual ejection  fraction is 60-65%.  The left ventricle is normal in structure, function and size.  No regional wall motion abnormalities noted.  The right ventricle is normal in structure, function and size.  There is no pericardial effusion.  ______________________________________________________________________________  Left Ventricle  The left ventricle is normal in structure, function and size. There is normal  left ventricular wall thickness. The visual ejection fraction is 60-65%. Left  ventricular diastolic function is indeterminate. No regional wall motion  abnormalities noted.     Right Ventricle  The right ventricle is normal in structure, function and size.     Atria  Normal left atrial size. Right atrial size is normal.     Mitral Valve  There is trace mitral regurgitation.     Tricuspid Valve  No tricuspid regurgitation.     Aortic Valve  No aortic regurgitation is present.     Pulmonic Valve  The pulmonic valve is not well visualized.     Vessels  The aortic root is normal size. Normal size ascending aorta. The inferior vena  cava is normal.     Pericardium  There is no pericardial effusion.     ______________________________________________________________________________  MMode/2D Measurements & Calculations  IVSd: 0.87 cm  LVIDd: 4.3 cm  LVIDs: 2.6 cm  LVPWd: 0.90 cm  FS: 40.9 %  LV mass(C)d: 122.1 grams  LV mass(C)dI: 74.3 grams/m2     Ao root diam: 3.0 cm  LA dimension: 3.6 cm  asc Aorta Diam: 3.2 cm  LA/Ao: 1.2  LA Volume (BP): 39.9 ml  LA Volume Index (BP): 24.3 ml/m2  RWT: 0.42     Doppler Measurements & Calculations  MV E max donte: 64.1 cm/sec  MV A max donte: 68.7 cm/sec  MV E/A: 0.93  MV dec slope: 322.4 cm/sec2     Ao V2 max: 143.5 cm/sec  Ao max P.0 mmHg  Ao V2 mean: 99.9 cm/sec  Ao mean P.5 mmHg  Ao V2 VTI: 32.7 cm  LV V1 max P.2 mmHg  LV V1 max: 114.4 cm/sec  LV V1 VTI: 24.8 cm  PI end-d donte: 110.8 cm/sec  AV Donte Ratio (DI): 0.80  E/E' av.1  Lateral E/e': 7.5  Medial E/e': 8.8      ______________________________________________________________________________  Report approved by: CANDICE Camacho 02/26/2022 10:33 AM         Heparin Unfractionated Anti Xa Level   Result Value Ref Range    Anti Xa Unfractionated Heparin 0.17 For Reference Range, See Comment IU/mL    Narrative    Therapeutic Range: UFH: 0.25-0.50 IU/mL for low intensity dosing,  0.30-0.70 IU/mL for high intensity dosing DVT and PE.  This test is not validated for other direct factor X inhibitors (e.g. rivaroxaban, apixaban, edoxaban, betrixaban, fondaparinux) and should not be used for monitoring of other medications.

## 2022-02-26 NOTE — ED PROVIDER NOTES
History     Chief Complaint:  Chest pressure    HPI   Rubi Nicolas is a 61 year old female who presents with chest pressure.  Patient notes that she developed new onset mild intermittent chest pressure beginning last evening.  She noted that pressure increased with activity earlier today while cross-country skiing.  Currently symptoms are resolved.  No previous significant cardiac history.  Past medical history of hypertension, hyperlipidemia, and type 2 diabetes.  Non-smoker.  No significant family history of heart disease.  Denies recent illness, leg swelling, shortness of breath.    ROS:  Review of Systems   Constitutional: Negative for fever.   Respiratory: Negative for shortness of breath.    Cardiovascular: Positive for chest pain. Negative for leg swelling.   Gastrointestinal: Negative for abdominal pain.   All other systems reviewed and are negative.      Allergies:  Keflex [Cephalexin Hcl]  Penicillins  Ragweeds     Medications:   Lisinopril   Amaryl   Omeprazole   Simvastatin   Metformin     Past Medical History:    GERD   Hypertension   Hyperlipidemia   Type 2 diabetes   Obesity     Past Surgical History:    Appendectomy   Knee arthroscopy    Colonoscopy    Ovarian cystotomy     Family History:    Hypertension   Cataract   Type 2 diabetes   Glaucoma   Macular degeneration     Social History:   reports that she has never smoked. She has never used smokeless tobacco. She reports that she does not drink alcohol and does not use drugs.  PCP: Luz Maria Demarco     Physical Exam     Patient Vitals for the past 24 hrs:   BP Temp Temp src Pulse Resp SpO2 Weight   02/25/22 2300 (!) 148/82 -- -- 76 18 96 % 63.5 kg (140 lb)   02/25/22 1850 (!) 163/89 97  F (36.1  C) Temporal 79 14 100 % --        Physical Exam  General: Alert and cooperative with exam. Patient in mild distress. Normal mentation.  Head:  Scalp is NC/AT  Eyes:  No scleral icterus, PERRL  ENT:  The external nose and ears are normal. The  oropharynx is normal and without erythema; mucus membranes are moist. Uvula midline, no evidence of deep space infection.  Neck:  Normal range of motion without rigidity.  CV:  Regular rate and rhythm    No pathologic murmur   Resp:  Breath sounds are clear bilaterally    Non-labored, no retractions or accessory muscle use  GI:  Abdomen is soft, no distension, no tenderness. No peritoneal signs  MS:  No lower extremity edema   Skin:  Warm and dry, No rash or lesions noted.  Neuro: Oriented x 3. No gross motor deficits.        Emergency Department Course   ECG  ECG taken at 1857, ECG read at 2230  Normal sinus rhythm    Rate 79 bpm. DE interval 156 ms. QRS duration 92 ms. QT/QTc 372/426 ms. P-R-T axes 38 17 48.     Imaging:  Chest XR,  PA & LAT   Final Result   IMPRESSION: Heart size is normal. Lungs are clear. No pneumothorax or pleural effusion.         Report per radiology    Laboratory:  Labs Ordered and Resulted from Time of ED Arrival to Time of ED Departure   COMPREHENSIVE METABOLIC PANEL - Abnormal       Result Value    Sodium 134      Potassium 3.3 (*)     Chloride 102      Carbon Dioxide (CO2) 27      Anion Gap 5      Urea Nitrogen 14      Creatinine 0.67      Calcium 9.5      Glucose 132 (*)     Alkaline Phosphatase 42      AST 17      ALT 33      Protein Total 7.7      Albumin 4.0      Bilirubin Total 0.3      GFR Estimate >90     TROPONIN I - Abnormal    Troponin I High Sensitivity 77 (*)    CBC WITH PLATELETS - Normal    WBC Count 7.2      RBC Count 4.27      Hemoglobin 12.9      Hematocrit 38.1      MCV 89      MCH 30.2      MCHC 33.9      RDW 12.5      Platelet Count 275     COVID-19 VIRUS (CORONAVIRUS) BY PCR - Normal    SARS CoV2 PCR Negative            Emergency Department Course:  Reviewed:  I reviewed nursing notes, vitals, past medical history and Care Everywhere    Assessments:  I obtained history and examined the patient as noted above.   I rechecked the patient and explained findings.          Interventions:  Medications   heparin loading dose for LOW INTENSITY TREATMENT * Give BEFORE starting heparin infusion (has no administration in time range)   heparin infusion 25,000 units in D5W 250 mL ANTICOAGULANT (has no administration in time range)   aspirin (ASA) tablet 325 mg (325 mg Oral Given 2/25/22 2462)        Disposition:  The patient was admitted to the hospital under the care of Dr. Benjamin.     Impression & Plan      Covid-19  Rubi Nicolas was evaluated during a global COVID-19 pandemic, which necessitated consideration that the patient might be at risk for infection with the SARS-CoV-2 virus that causes COVID-19.   Applicable protocols for evaluation were followed during the patient's care.   COVID-19 was considered as part of the patient's evaluation. The plan for testing is:  a test was obtained during this visit.    Medical Decision Making:  Rubi Nicolas is a 61 year old female who presents with chest pain.  Her history and risk factor analysis are significant for hypertension, hyperlipidemia, and type 2 diabetes.  The workup in the Emergency Department (see above for cardiac enzymes and EKG)  is notable for elevated troponin with normal EKG.  My clinical suspicion of acute coronary syndrome is high enough to warrant further therapy and investigation.  I will admit the patient  to medicine service for further workup.  The patient is pain free in the emergency department.  Provided aspirin.  After discussing with her the risks and benefits of heparinization and given lack of contraindication to this therapy, heparin bolus and drip were started given suspicion of acute coronary syndrome.      There is no clinical, laboratory, or radiographic evidence of pulmonary embolism, AAA, aortic dissection, pneumonia or pneumothorax.     She agrees to be admitted and all questions were answered.      Diagnosis:    ICD-10-CM    1. Chest pressure  R07.89    2. Elevated troponin   R77.8              Kyle Bryson, DO  02/26/22 0253

## 2022-02-26 NOTE — PHARMACY-ADMISSION MEDICATION HISTORY
Pharmacy Medication History  Admission medication history interview status for the 2022  admission is complete. See EPIC admission navigator for prior to admission medications     Location of Interview: Patient room  Medication history sources: Patient, Surescripts and Care Everywhere    Significant changes made to the medication list:  Removed  BG monitoring supplies (Rxs from 5972-6470, all , patient using Freestyle CGM)  Removed triamcinolone PRN cream (Rx from 2017, patient no longer using)   Adjusted:   -lisinopril 10 mg --> 5 mg   -added dose/directions to omeprazole    In the past week, patient estimated taking medication this percent of the time: greater than 90%    Additional medication history information:   Patient is a reliable historian.     Medication reconciliation completed by provider prior to medication history? Yes    Time spent in this activity: 20 minutes    Prior to Admission medications    Medication Sig Last Dose Taking? Auth Provider   aspirin 81 MG tablet Take 1 tablet (81 mg) by mouth daily 2022 at 1800 Yes Luz Maria Demarco PA-C   glimepiride (AMARYL) 2 MG tablet TAKE 3 TABLETS (6 MG) BY MOUTH EVERY MORNING (BEFORE BREAKFAST) 2022 at AM Yes Luz Maria Demarco PA-C   lisinopril (ZESTRIL) 10 MG tablet Take 1 tablet (10 mg) by mouth daily  Patient taking differently: Take 5 mg by mouth daily  2022 at 1800 Yes Luz Maria Demarco PA-C   metFORMIN (GLUCOPHAGE) 1000 MG tablet TAKE 1 TABLET BY MOUTH TWICE A DAY WITH MEALS 2022 at X 2 doses Yes Luz Maria Demarco PA-C   Multiple Vitamin (MULTIVITAMIN OR) Take 1 tablet by mouth daily. 2022 at 1800 Yes Reported, Patient   omeprazole (PRILOSEC) 20 MG DR capsule Take 20 mg by mouth twice a week Monday, Thursday Past Week at Unknown time Yes Unknown, Entered By History   simvastatin (ZOCOR) 40 MG tablet TAKE 1 AND 1/2 TABLETS BY MOUTH DAILY. FILL 20 at 1800 Yes Luz Maria Demarco PA-C   Continuous  Blood Gluc  (FREESTYLE ADRIANA 14 DAY READER) ROSALIE USE 1 DEVICE CONTINUOUSLY   Luz Maria Demarco PA-C   Continuous Blood Gluc Sensor (FREESTYLE ADRIANA 14 DAY SENSOR) [facility] [place] USE ONE EVERY 14 DAYS   Luz Maria Demarco PA-C       The information provided in this note is only as accurate as the sources available at the time of update(s)   Silvia Jones, AbbieD

## 2022-02-26 NOTE — PLAN OF CARE
..Neuro- AxO 4  Tele/Cardiac- SR  Resp- RA  Activity- IND  Pain- Denies  Drips- Hep @ 750, 10A @ 1230  Drains/Tubes- PIV x 1  Skin- WDL  GI/- WDL  Aggression Color- Green  COVID status- Neg  Plan- Echo, Cards consult

## 2022-02-26 NOTE — ED NOTES
[facility]  ED Nurse Handoff Report    ED Chief complaint: Chest Pain      ED Diagnosis:   Final diagnoses:   Chest pressure   Elevated troponin       Code Status: Full Code    Allergies:   Allergies   Allergen Reactions     Keflex [Cephalexin Hcl] Hives     Penicillins Hives     Ragweeds      Sneezing etc.       Patient Story: Patient presents with complaints of chest pain that started last night, denied shortness of breath, dizziness and nausea.   Focused Assessment:  Patient is alert and oriented x 4, calm and cooperative. VS are stable, skin is warm and dry, respirations are even and non-labored on room air. Patient denied chest pain, shortness of breath, dizziness, and nausea.       Treatments and/or interventions provided:   Medications   heparin infusion 25,000 units in D5W 250 mL ANTICOAGULANT (750 Units/hr Intravenous New Bag 2/26/22 0014)   aspirin (ASA) tablet 325 mg (325 mg Oral Given 2/25/22 2252)   heparin loading dose for LOW INTENSITY TREATMENT * Give BEFORE starting heparin infusion (3,800 Units Intravenous Given 2/26/22 0013)       Patient's response to treatments and/or interventions: Stable, currently asymptomatic    To be done/followed up on inpatient unit:  Monitor    Does this patient have any cognitive concerns?: None    Activity level - Baseline/Home:  Independent  Activity Level - Current:   Independent    Patient's Preferred language: English   Needed?: No    Isolation: None  Infection: Not Applicable  Patient tested for COVID 19 prior to admission: YES  Bariatric?: No    Vital Signs:   Vitals:    02/25/22 1850 02/25/22 2300   BP: (!) 163/89 (!) 148/82   Pulse: 79 76   Resp: 14 18   Temp: 97  F (36.1  C)    TempSrc: Temporal    SpO2: 100% 96%   Weight:  63.5 kg (140 lb)       Cardiac Rhythm:     Was the PSS-3 completed:   Yes  What interventions are required if any?               Family Comments: No family present at this time.    OBS brochure/video  discussed/provided to patient/family: Yes              Name of person given brochure if not patient: NA              Relationship to patient: NA    For the majority of the shift this patient's behavior was Green.   Behavioral interventions performed were  information and reassurance provided.  .    ED NURSE PHONE NUMBER: [phone]

## 2022-02-26 NOTE — H&P
[facility]    History and Physical  Hospitalist       Date of Admission:  2/25/2022  Date of Service (when I saw the patient): 02/25/22    Assessment & Plan   Rubi Nicolas is a 61 year old female who presents with chest pain    NSTEMI  HTN  HLD  [needs rec- ASA 81 mg daily, lisinopril 10 mg daily, simvastatin 60 mg daily]  With intermittent chest pain over last day or so, lasting seconds. No associated sx. Mildly hypertensive in ED. Initial troponin elevated at 77. CXR clear.   - telemetry   - serial troponins  - NPO, IV fluids  - aspirin 81 mg daily  - heparin gtt  - echocardiogram  - cardiology consult  - continue home lisinopril 10 mg daily, simvastatin 60 mg daily   - start metoprolol 12.5 BID    Hypokalemia  K 3.3 on presentation   - replace per protocol    DM II  [needs rec- glimepiride 6 mg daily, metformin 1000 mg BID]  Most recent A1C 6.8% 8/2021.   - continue glimepiride with rec  - hold metformin  - BID and HS blood sugars    GERD  [needs rec- omeprazole]  - resume with rec    COVID-19 negative    DVT Prophylaxis: Ambulate every shift, heparin gtt  Code Status: Full Code    Disposition: Expected discharge in 2-4 days     Sukhdeep Benjamin MD  [phone] (P)  Text Page     Primary Care Physician   Luz Maria Demarco    Chief Complaint   Chest pain    History is obtained from the patient and medical records    History of Present Illness   Rubi Nicolas is a 61 year old female who presents with chest pain. She has history of diabetes mellitus, high blood pressure and hyperlipidemia. She denies any known family history of heart disease. She states the day prior to presentation she was driving home from work when she felt a slight pressure sensation in her chest. She is states it lasted seconds. That evening she was walking her dog and she noticed it again. Throughout the day today she had intermittent chest pain, worsened with exertion. Again it would last seconds  and resolve. She states it will come on every 15- 30 minutes. She denies any associated symptoms of shortness of breath, nausea vomiting or diaphoresis. Denies any abdominal pain or lower extremity edema. She reports her diabetes has been under reasonable control but states her blood sugars have been elevated over winter. She has never had a history of cardiac issues in the past.    Past Medical History    I have reviewed this patient's medical history and updated it with pertinent information if needed.   Past Medical History:   Diagnosis Date     Atypical squamous cells of undetermined significance (ASCUS) on Papanicolaou smear of cervix 10/9/2015     GERD (gastroesophageal reflux disease)      HTN (hypertension)      Hyperlipidemia      Type 2 diabetes mellitus without complications (H) 10/22/2015       Past Surgical History   I have reviewed this patient's surgical history and updated it with pertinent information if needed.  Past Surgical History:   Procedure Laterality Date     APPENDECTOMY  1976    at time of USO     ARTHROSCOPY KNEE RT/LT       COLONOSCOPY N/A 11/9/2015    Procedure: COLONOSCOPY;  Surgeon: Kate Redmond MD;  Location:  GI     COLONOSCOPY N/A 2/14/2022    Procedure: COLONOSCOPY;  Surgeon: Kate Redmond MD;  Location: [facility]     Z LAP OVARIAN CYSTOTOMY  1976    salpingooopherectomy for large dermoid       Prior to Admission Medications   Prior to Admission Medications   Prescriptions Last Dose Informant Patient Reported? Taking?   Continuous Blood Gluc  (FREESTYLE ADRIANA 14 DAY READER) ROSALIE   No No   Sig: USE 1 DEVICE CONTINUOUSLY   Continuous Blood Gluc Sensor (FREESTYLE ADRIANA 14 DAY SENSOR) MISC   No No   Sig: USE ONE EVERY 14 DAYS   Multiple Vitamin (MULTIVITAMIN OR)   Yes No   Sig: Take 1 tablet by mouth daily.   OMEPRAZOLE PO   Yes No   TRUETEST test strip   No No   Sig: TEST TWICE DAILY   alcohol swab prep pads   No No   Sig: Use to swab area of injection/tim  as directed.   aspirin 81 MG tablet   Yes No   Sig: Take 1 tablet (81 mg) by mouth daily   blood glucose calibration (NO BRAND SPECIFIED) solution   No No   Sig: To accompany: Blood Glucose Monitor Brands: per insurance.   blood glucose monitoring (NO BRAND SPECIFIED) meter device kit   No No   Sig: Use to test blood sugar 2 times daily or as directed. Preferred blood glucose meter OR supplies to accompany: Blood Glucose Monitor Brands: per insurance.   blood glucose monitoring (SOFTCLIX) lancets   No No   Sig: TESTING 2X DAILY USE WITH LANCETING DEVICE   glimepiride (AMARYL) 2 MG tablet   No No   Sig: TAKE 3 TABLETS (6 MG) BY MOUTH EVERY MORNING (BEFORE BREAKFAST)   lisinopril (ZESTRIL) 10 MG tablet   No No   Sig: Take 1 tablet (10 mg) by mouth daily   metFORMIN (GLUCOPHAGE) 1000 MG tablet   No No   Sig: TAKE 1 TABLET BY MOUTH TWICE A DAY WITH MEALS   simvastatin (ZOCOR) 40 MG tablet   No No   Sig: TAKE 1 AND 1/2 TABLETS BY MOUTH DAILY. FILL 8-21-20   triamcinolone (KENALOG) 0.1 % cream   No No   Sig: Apply sparingly to affected area three times daily for 14 days.   Patient taking differently: as needed Apply sparingly to affected area three times daily for 14 days.      Facility-Administered Medications: None     Allergies   Allergies   Allergen Reactions     Keflex [Cephalexin Hcl] Hives     Penicillins Hives     Ragweeds      Sneezing etc.       Social History   I have reviewed this patient's social history and updated it with pertinent information if needed. Rubi Nicolas  reports that she has never smoked. She has never used smokeless tobacco. She reports that she does not drink alcohol and does not use drugs.    Family History   I have reviewed this patient's family history and updated it with pertinent information if needed.   Family History   Problem Relation Age of Onset     Diabetes Mother         type 2     Hypertension Mother      Dementia Mother 89     Hypertension Father      Cerebrovascular  Disease Father      Cancer Father 68        brain ca     Diabetes Maternal Uncle         type 1       Review of Systems   The 10 point Review of Systems is negative other than noted in the HPI or here.     Physical Exam   Temp: 97  F (36.1  C) Temp src: Temporal BP: (!) 163/89 Pulse: 79   Resp: 14 SpO2: 100 %      Vital Signs with Ranges  0 lbs 0 oz    Constitutional: alert, oriented and in no acute distress  Eyes: EOMI, PERRL  HEENT: OP clear  Respiratory: CTA B without w/c  Cardiovascular: RRR no murmur. no edema.  GI: soft, nontender, nondistended, BS present  Lymph/Hematologic: no cervical LAD  Genitourinary: deferred  Skin: no rashes or lesions grossly  Musculoskeletal: no deformities or arthritis  Neurologic: CN II-XII, FARRIS  Psychiatric: mood and affect wnl    Data   Data reviewed today:  I personally reviewed the EKG tracing showing NSR and the chest x-ray image(s) showing clear lungs.  Recent Labs   Lab 02/25/22  1857   WBC 7.2   HGB 12.9   MCV 89         POTASSIUM 3.3*   CHLORIDE 102   CO2 27   BUN 14   CR 0.67   ANIONGAP 5   CHRISTOPHER 9.5   *   ALBUMIN 4.0   PROTTOTAL 7.7   BILITOTAL 0.3   ALKPHOS 42   ALT 33   AST 17       Recent Results (from the past 24 hour(s))   Chest XR,  PA & LAT    Narrative    EXAM: XR CHEST 2 VW  LOCATION: [facility]  DATE/TIME: 2/25/2022 10:40 PM    INDICATION: chest pain  COMPARISON: None.      Impression    IMPRESSION: Heart size is normal. Lungs are clear. No pneumothorax or pleural effusion.

## 2022-02-26 NOTE — PROGRESS NOTES
..RECEIVING UNIT ED HANDOFF REVIEW    ED Nurse Handoff Report was reviewed by: Tirso Luke RN on February 26, 2022 at 1:02 AM

## 2022-02-27 LAB
ANION GAP SERPL CALCULATED.3IONS-SCNC: 2 MMOL/L (ref 3–14)
BASOPHILS # BLD AUTO: 0.1 10E3/UL (ref 0–0.2)
BASOPHILS NFR BLD AUTO: 1 %
BUN SERPL-MCNC: 16 MG/DL (ref 7–30)
CALCIUM SERPL-MCNC: 9.5 MG/DL (ref 8.5–10.1)
CHLORIDE BLD-SCNC: 108 MMOL/L (ref 94–109)
CHOLEST SERPL-MCNC: 161 MG/DL
CO2 SERPL-SCNC: 28 MMOL/L (ref 20–32)
CREAT SERPL-MCNC: 0.71 MG/DL (ref 0.52–1.04)
EOSINOPHIL # BLD AUTO: 0.3 10E3/UL (ref 0–0.7)
EOSINOPHIL NFR BLD AUTO: 4 %
ERYTHROCYTE [DISTWIDTH] IN BLOOD BY AUTOMATED COUNT: 12.6 % (ref 10–15)
GFR SERPL CREATININE-BSD FRML MDRD: >90 ML/MIN/1.73M2
GLUCOSE BLD-MCNC: 176 MG/DL (ref 70–99)
HCT VFR BLD AUTO: 36.5 % (ref 35–47)
HDLC SERPL-MCNC: 62 MG/DL
HGB BLD-MCNC: 12.2 G/DL (ref 11.7–15.7)
IMM GRANULOCYTES # BLD: 0 10E3/UL
IMM GRANULOCYTES NFR BLD: 0 %
LDLC SERPL CALC-MCNC: 68 MG/DL
LYMPHOCYTES # BLD AUTO: 2.5 10E3/UL (ref 0.8–5.3)
LYMPHOCYTES NFR BLD AUTO: 42 %
MCH RBC QN AUTO: 29.6 PG (ref 26.5–33)
MCHC RBC AUTO-ENTMCNC: 33.4 G/DL (ref 31.5–36.5)
MCV RBC AUTO: 89 FL (ref 78–100)
MONOCYTES # BLD AUTO: 0.4 10E3/UL (ref 0–1.3)
MONOCYTES NFR BLD AUTO: 7 %
NEUTROPHILS # BLD AUTO: 2.7 10E3/UL (ref 1.6–8.3)
NEUTROPHILS NFR BLD AUTO: 46 %
NONHDLC SERPL-MCNC: 99 MG/DL
NRBC # BLD AUTO: 0 10E3/UL
NRBC BLD AUTO-RTO: 0 /100
PLATELET # BLD AUTO: 251 10E3/UL (ref 150–450)
POTASSIUM BLD-SCNC: 3.9 MMOL/L (ref 3.4–5.3)
RBC # BLD AUTO: 4.12 10E6/UL (ref 3.8–5.2)
SODIUM SERPL-SCNC: 138 MMOL/L (ref 133–144)
TRIGL SERPL-MCNC: 154 MG/DL
UFH PPP CHRO-ACNC: 0.29 IU/ML
WBC # BLD AUTO: 6 10E3/UL (ref 4–11)

## 2022-02-27 PROCEDURE — 250N000011 HC RX IP 250 OP 636: Performed by: INTERNAL MEDICINE

## 2022-02-27 PROCEDURE — 85520 HEPARIN ASSAY: CPT | Performed by: INTERNAL MEDICINE

## 2022-02-27 PROCEDURE — 250N000013 HC RX MED GY IP 250 OP 250 PS 637: Performed by: INTERNAL MEDICINE

## 2022-02-27 PROCEDURE — 99232 SBSQ HOSP IP/OBS MODERATE 35: CPT | Performed by: INTERNAL MEDICINE

## 2022-02-27 PROCEDURE — 85025 COMPLETE CBC W/AUTO DIFF WBC: CPT | Performed by: INTERNAL MEDICINE

## 2022-02-27 PROCEDURE — 258N000003 HC RX IP 258 OP 636: Performed by: INTERNAL MEDICINE

## 2022-02-27 PROCEDURE — 80048 BASIC METABOLIC PNL TOTAL CA: CPT | Performed by: INTERNAL MEDICINE

## 2022-02-27 PROCEDURE — 36415 COLL VENOUS BLD VENIPUNCTURE: CPT | Performed by: INTERNAL MEDICINE

## 2022-02-27 PROCEDURE — 80061 LIPID PANEL: CPT | Performed by: INTERNAL MEDICINE

## 2022-02-27 PROCEDURE — 210N000001 HC R&B IMCU HEART CARE

## 2022-02-27 RX ADMIN — ASPIRIN 81 MG 81 MG: 81 TABLET ORAL at 08:25

## 2022-02-27 RX ADMIN — METOPROLOL TARTRATE 12.5 MG: 25 TABLET, FILM COATED ORAL at 08:26

## 2022-02-27 RX ADMIN — LISINOPRIL 10 MG: 10 TABLET ORAL at 21:01

## 2022-02-27 RX ADMIN — METOPROLOL TARTRATE 12.5 MG: 25 TABLET, FILM COATED ORAL at 21:01

## 2022-02-27 RX ADMIN — HEPARIN SODIUM 900 UNITS/HR: 1000 INJECTION INTRAVENOUS; SUBCUTANEOUS at 01:46

## 2022-02-27 RX ADMIN — SIMVASTATIN 60 MG: 20 TABLET, FILM COATED ORAL at 21:01

## 2022-02-27 ASSESSMENT — ACTIVITIES OF DAILY LIVING (ADL)
ADLS_ACUITY_SCORE: 14

## 2022-02-27 NOTE — PROGRESS NOTES
[facility]    Cardiology Consultation     Date of Admission:  2/25/2022    Assessment & Plan   Rubi Nicolas is a 61 year old female who was admitted on 2/25/2022. I was asked to see the patient for NSTEMI.    Patient had a 1 day history of chest pressure (she was cross country skiing at the time).  She has multiple risk factors for coronary disease including hypertension hyperlipidemia, type 2 diabetes.    She was found to have an elevated troponin with a peak of 77.  She been placed on heparin.  She is chest pain-free.  She has no known cardiac history.    EKG does not have ischemic changes.    Interval history:    No chest pain.  Stable on heparin.      Problems:  NSTEMI  Hypertension  Hyperlipidemia  Type 2 diabetes    Plan:  Agree with heparin infusion    Echocardiogram notes a normal LVEF with no regional wall motion abnormalities    Continue aspirin, metoprolol, lisinopril, simvastatin    Given the patient's risk factor profile and the fact that she is here with an NSTEMI we should proceed with a coronary angiogram.  This will likely be on Monday.  I discussed the risk and benefits of this.  She is keen to proceed.  Please contact cardiology if there is any decompensation or recurrent chest pain for the patient.  We may need to do the angiogram sooner if she has decompensation.    Dexter Garrido MD     Code Status    Full Code  Physical Exam   Temp: 98.3  F (36.8  C) Temp src: Oral BP: 106/68 Pulse: 68   Resp: 16 SpO2: 98 % O2 Device: None (Room air)    Vital Signs with Ranges  Temp:  [98.3  F (36.8  C)-98.4  F (36.9  C)] 98.3  F (36.8  C)  Pulse:  [62-68] 68  Resp:  [16-18] 16  BP: ()/(63-82) 106/68  SpO2:  [96 %-98 %] 98 %  139 lbs 3.2 oz    GENERAL APPEARANCE: Alert and oriented x3. No acute distress.  SKIN: Inspection of the skin reveals no rashes, ulcerations, warm, dry  NECK: Supple and symmetric.   Normal JVP  LUNGS: Clear breath sounds throughout  bilaterally, no wheezes, no rhonchi  CARDIOVASCULAR: S1, S2, regular rate and rhythm without any murmurs, gallops, rubs. The carotid pulses were normal and 2+ bilaterally without bruits. Peripheral pulses were 2+ and symmetric.  ABDOMEN: Soft, non-tender, non-distended with normal bowel sounds.  No ascites noted.  EXTREMITIES: No edema.  NEUROLOGIC:  Normal mood and affect.  Sensation to touch was normal.      Data   Results for orders placed or performed during the hospital encounter of 02/25/22 (from the past 24 hour(s))   Heparin Unfractionated Anti Xa Level   Result Value Ref Range    Anti Xa Unfractionated Heparin 0.17 For Reference Range, See Comment IU/mL    Narrative    Therapeutic Range: UFH: 0.25-0.50 IU/mL for low intensity dosing,  0.30-0.70 IU/mL for high intensity dosing DVT and PE.  This test is not validated for other direct factor X inhibitors (e.g. rivaroxaban, apixaban, edoxaban, betrixaban, fondaparinux) and should not be used for monitoring of other medications.   Heparin Unfractionated Anti Xa Level   Result Value Ref Range    Anti Xa Unfractionated Heparin 0.28 For Reference Range, See Comment IU/mL    Narrative    Therapeutic Range: UFH: 0.25-0.50 IU/mL for low intensity dosing,  0.30-0.70 IU/mL for high intensity dosing DVT and PE.  This test is not validated for other direct factor X inhibitors (e.g. rivaroxaban, apixaban, edoxaban, betrixaban, fondaparinux) and should not be used for monitoring of other medications.   Heparin Unfractionated Anti Xa Level   Result Value Ref Range    Anti Xa Unfractionated Heparin 0.29 For Reference Range, See Comment IU/mL    Narrative    Therapeutic Range: UFH: 0.25-0.50 IU/mL for low intensity dosing,  0.30-0.70 IU/mL for high intensity dosing DVT and PE.  This test is not validated for other direct factor X inhibitors (e.g. rivaroxaban, apixaban, edoxaban, betrixaban, fondaparinux) and should not be used for monitoring of other medications.   CBC with  Platelets & Differential    Narrative    The following orders were created for panel order CBC with Platelets & Differential.  Procedure                               Abnormality         Status                     ---------                               -----------         ------                     CBC with platelets and d...[[id number]]                      Final result                 Please view results for these tests on the individual orders.   Lipid panel reflex to direct LDL   Result Value Ref Range    Cholesterol 161 <200 mg/dL    Triglycerides 154 (H) <150 mg/dL    Direct Measure HDL 62 >=50 mg/dL    LDL Cholesterol Calculated 68 <=100 mg/dL    Non HDL Cholesterol 99 <130 mg/dL    Narrative    Cholesterol  Desirable:  <200 mg/dL    Triglycerides  Normal:  Less than 150 mg/dL  Borderline High:  150-199 mg/dL  High:  200-499 mg/dL  Very High:  Greater than or equal to 500 mg/dL    Direct Measure HDL  Female:  Greater than or equal to 50 mg/dL   Male:  Greater than or equal to 40 mg/dL    LDL Cholesterol  Desirable:  <100mg/dL  Above Desirable:  100-129 mg/dL   Borderline High:  130-159 mg/dL   High:  160-189 mg/dL   Very High:  >= 190 mg/dL    Non HDL Cholesterol  Desirable:  130 mg/dL  Above Desirable:  130-159 mg/dL  Borderline High:  160-189 mg/dL  High:  190-219 mg/dL  Very High:  Greater than or equal to 220 mg/dL   Basic metabolic panel   Result Value Ref Range    Sodium 138 133 - 144 mmol/L    Potassium 3.9 3.4 - 5.3 mmol/L    Chloride 108 94 - 109 mmol/L    Carbon Dioxide (CO2) 28 20 - 32 mmol/L    Anion Gap 2 (L) 3 - 14 mmol/L    Urea Nitrogen 16 7 - 30 mg/dL    Creatinine 0.71 0.52 - 1.04 mg/dL    Calcium 9.5 8.5 - 10.1 mg/dL    Glucose 176 (H) 70 - 99 mg/dL    GFR Estimate >90 >60 mL/min/1.73m2   CBC with platelets and differential   Result Value Ref Range    WBC Count 6.0 4.0 - 11.0 10e3/uL    RBC Count 4.12 3.80 - 5.20 10e6/uL    Hemoglobin 12.2 11.7 - 15.7 g/dL    Hematocrit 36.5 35.0 - 47.0 %     MCV 89 78 - 100 fL    MCH 29.6 26.5 - 33.0 pg    MCHC 33.4 31.5 - 36.5 g/dL    RDW 12.6 10.0 - 15.0 %    Platelet Count 251 150 - 450 10e3/uL    % Neutrophils 46 %    % Lymphocytes 42 %    % Monocytes 7 %    % Eosinophils 4 %    % Basophils 1 %    % Immature Granulocytes 0 %    NRBCs per 100 WBC 0 <1 /100    Absolute Neutrophils 2.7 1.6 - 8.3 10e3/uL    Absolute Lymphocytes 2.5 0.8 - 5.3 10e3/uL    Absolute Monocytes 0.4 0.0 - 1.3 10e3/uL    Absolute Eosinophils 0.3 0.0 - 0.7 10e3/uL    Absolute Basophils 0.1 0.0 - 0.2 10e3/uL    Absolute Immature Granulocytes 0.0 <=0.4 10e3/uL    Absolute NRBCs 0.0 10e3/uL

## 2022-02-27 NOTE — PROGRESS NOTES
"[facility]    Internal Medicine Hospitalist Progress Note  02/27/2022  I evaluated patient on the above date.    Papa Cortez Jr., MD  [phone] (p)  Text Page  Vocera        Assessment & Plan New actions/orders today (02/27/2022) are underlined.    Rubi Nicolas is a 61 year old female with history including DM2, HTN and DLD, mathieu presented 2/25/2022 with chest pain and found with trop elevation.    NSTEMI  Hypertension (benign essential).  [PTA: lisinopril 5 mg daily.]  * Presented 2/25 with intermittent chest pain over last day or so PTA, lasting seconds. On initial evaluation 2/25, was mildly hypertensive; ECG showed SR w/o acute ischemic changes; trop 77. CXR 2/25 negative. Started on heparin gtt and metoprolol an admit. Continued on ASA and simvastatin on admit. Cardiology consulted on admit.  * Follow-up trops \"flat\". Echo 2/26 showed LVEF 60-65%, no regional wall motion abnormalities; RV OK.  Recent Labs   Lab 02/26/22  0704 02/26/22  0410 02/25/22  1857   TROPONINIS 68* 71* 77*   - Plan possible angiogram 2/28.  - Continue heparin gtt.  - Continue ASA, simvastatin.  - Continue lisinopril 10 mg daily; metoprolol 12.5 mg BID.  - Appreciate Cardiology help.    DM II.  [PTA: glimepiride 6 mg daily, metformin 1000 mg BID.]  * Most recent A1C 6.8% 8/2021.   Recent Labs   Lab 02/27/22  0539 02/25/22  1857   * 132*   - Continue carb controlled diet.  - Monitor glucose levels.  - Hold PTA glimepiride and metformin for now.    DLD.  * PTA on simvastatin.  * FLP 2/27: , HDL 62, LDL 48, .  - Continue simvastatin.    Hypokalemia.  * Potassium low on admit 2/25. Treated with replacement.  Recent Labs   Lab 02/27/22  0539 02/26/22  0410 02/25/22  1857   POTASSIUM 3.9 3.7 3.3*   - Continue PRN K replacement.    GERD.  * Chronic and stable.  - Continue omeprazole/pantoprazole.    Clinically Significant Risk Factors Present on Admission              # Overweight: " "Estimated body mass index is 25.46 kg/m  as calculated from the following:    Height as of 2/14/22: 1.575 m (5' 2\").    Weight as of this encounter: 63.1 kg (139 lb 3.2 oz).        COVID-19 testing.  COVID-19 PCR Results    COVID-19 PCR Results 2/11/22 2/26/22   SARS CoV2 PCR Negative Negative      Comments are available for some flowsheets but are not being displayed.         COVID-19 Antibody Results, Testing for Immunity    COVID-19 Antibody Results, Testing for Immunity   No data to display.             Diet: Low Fat Diet    Prophylaxis: PCD's, ambulation. On heparin gtt.  Castro Catheter: Not present  Central Lines: None  Code Status: Full Code    Disposition Plan   Expected discharge: 1-2d recommended to prior living arrangement pending cardiac workup.  Entered: Papa Cortez MD 02/27/2022, 12:29 PM         Interval History   Doing well.  No chest pain.  Tolerating diet.    -Data reviewed today: I reviewed all new labs and imaging over the last 24 hours. I personally reviewed no images or EKG's today.    Physical Exam    , Blood pressure 106/68, pulse 68, temperature 98.3  F (36.8  C), temperature source Oral, resp. rate 16, weight 63.1 kg (139 lb 3.2 oz), SpO2 98 %, not currently breastfeeding.  Vitals:    02/25/22 2300 02/26/22 0452 02/27/22 0149   Weight: 63.5 kg (140 lb) 64 kg (141 lb) 63.1 kg (139 lb 3.2 oz)     Vital Signs with Ranges  Temp:  [98.3  F (36.8  C)-98.4  F (36.9  C)] 98.3  F (36.8  C)  Pulse:  [62-68] 68  Resp:  [16-18] 16  BP: ()/(63-82) 106/68  SpO2:  [96 %-98 %] 98 %  Patient Vitals for the past 24 hrs:   BP Temp Temp src Pulse Resp SpO2 Weight   02/27/22 0806 106/68 98.3  F (36.8  C) Oral 68 16 98 % --   02/27/22 0149 99/63 98.4  F (36.9  C) Oral 63 16 97 % 63.1 kg (139 lb 3.2 oz)   02/26/22 2044 115/78 98.4  F (36.9  C) Oral 63 16 97 % --   02/26/22 1609 130/82 98.3  F (36.8  C) Oral 62 18 96 % --     I/O's Last 24 hours  I/O last 3 completed shifts:  In: 312 [P.O.:240; " I.V.:72]  Out: -     Constitutional: Awake, alert, pleasant.  Respiratory: Diminished in bases. No crackles or wheezes.  Cardiovascular: RRR, no m/r/g.  GI:   Skin/Integumen:   Other:        Data   Recent Labs   Lab 02/27/22  0539 02/26/22  0704 02/26/22  0410 02/25/22  1857   WBC 6.0  --   --  7.2   HGB 12.2  --   --  12.9   MCV 89  --   --  89     --   --  275     --   --  134   POTASSIUM 3.9  --  3.7 3.3*   CHLORIDE 108  --   --  102   CO2 28  --   --  27   BUN 16  --   --  14   CR 0.71  --   --  0.67   ANIONGAP 2*  --   --  5   CHRISTOPHER 9.5  --   --  9.5   *  --   --  132*   ALBUMIN  --   --   --  4.0   PROTTOTAL  --   --   --  7.7   BILITOTAL  --   --   --  0.3   ALKPHOS  --   --   --  42   ALT  --   --   --  33   AST  --   --   --  17   TROPONINIS  --  68* 71* 77*     Recent Labs   Lab Test 02/27/22  0539 02/25/22  1857 12/01/20  0841 01/24/20  0829 05/31/19  0900   * 132* 189* 199* 154*     Recent Labs   Lab 02/27/22  0539 02/25/22  1857   WBC 6.0 7.2         No results found for this or any previous visit (from the past 24 hour(s)).    Medications   All medications were reviewed.    Continuing ACE inhibitor/ARB/ARNI from home medication list OR ACE inhibitor/ARB order already placed during this visit       - MEDICATION INSTRUCTIONS -       heparin 900 Units/hr (02/27/22 0829)     - MEDICATION INSTRUCTIONS -       - MEDICATION INSTRUCTIONS -         aspirin  81 mg Oral Daily     lisinopril  10 mg Oral Daily     metoprolol tartrate  12.5 mg Oral BID     [START ON 2/28/2022] omeprazole  20 mg Oral Once per day on Mon Thu     simvastatin  60 mg Oral At Bedtime     sodium chloride (PF)  3 mL Intracatheter Q8H     acetaminophen **OR** acetaminophen, Continuing ACE inhibitor/ARB/ARNI from home medication list OR ACE inhibitor/ARB order already placed during this visit, - MEDICATION INSTRUCTIONS -, HOLD MEDICATION, lidocaine 4%, lidocaine (buffered or not buffered), magnesium hydroxide, -  MEDICATION INSTRUCTIONS -, melatonin, ondansetron **OR** ondansetron, - MEDICATION INSTRUCTIONS -, senna-docusate **OR** senna-docusate, sodium chloride (PF)

## 2022-02-27 NOTE — PLAN OF CARE
Goal Outcome Evaluation:    Plan of Care Reviewed With: patient     Pt denies chest pain or SOB, up independently, Tele SR, Plan for angiogram on Monday.

## 2022-02-27 NOTE — PLAN OF CARE
Goal Outcome Evaluation:    Plan of Care Reviewed With: patient     Vitals stable and cardiac rhythm remains SR without ectopy.  Pt declines pain or discomfort.  Up independently to restroom without difficulty.  Heparin gtt continues. Plan is for Angio on Monday.

## 2022-02-27 NOTE — PLAN OF CARE
Care plan note:      Recent Vitals:  Temp: 98.4  F (36.9  C) Temp src: Oral BP: 99/63 Pulse: 63   Resp: 16 SpO2: 97 % O2 Device: None (Room air)      Orientation/Neuro: Alert and Oriented x 4  Pain: The patient is not having any pain.   Tele: Sinus rhythm    IV medications: Heparin   Mobility: up Independently   Skin: With in normal limits   GI: WDL  : WDL     Diet: Tolerating diet:   Well  Orders Placed This Encounter      Low Fat Diet      Safety/Concerns:  None  Aggression Color: Green    Plan: no chest pain. Cards following, planing angio on Monday. Following K+ protocol. Heparin re-check next Am.   Continue to monitor.      Zully Buckley RN

## 2022-02-28 LAB
ACT BLD: 254 SECONDS (ref 74–150)
ACT BLD: 322 SECONDS (ref 74–150)
ANION GAP SERPL CALCULATED.3IONS-SCNC: 6 MMOL/L (ref 3–14)
BASE EXCESS BLDV CALC-SCNC: -0.8 MMOL/L (ref -7.7–1.9)
BUN SERPL-MCNC: 15 MG/DL (ref 7–30)
CALCIUM SERPL-MCNC: 8.9 MG/DL (ref 8.5–10.1)
CHLORIDE BLD-SCNC: 108 MMOL/L (ref 94–109)
CHOLEST SERPL-MCNC: 165 MG/DL
CO2 SERPL-SCNC: 24 MMOL/L (ref 20–32)
CREAT SERPL-MCNC: 0.62 MG/DL (ref 0.52–1.04)
ERYTHROCYTE [DISTWIDTH] IN BLOOD BY AUTOMATED COUNT: 12.4 % (ref 10–15)
GFR SERPL CREATININE-BSD FRML MDRD: >90 ML/MIN/1.73M2
GLUCOSE BLD-MCNC: 161 MG/DL (ref 70–99)
GLUCOSE BLDC GLUCOMTR-MCNC: 170 MG/DL (ref 70–99)
HCO3 BLDV-SCNC: 24 MMOL/L (ref 21–28)
HCT VFR BLD AUTO: 37.5 % (ref 35–47)
HDLC SERPL-MCNC: 58 MG/DL
HGB BLD-MCNC: 11.8 G/DL (ref 11.7–15.7)
HGB BLD-MCNC: 12.7 G/DL (ref 11.7–15.7)
LDLC SERPL CALC-MCNC: 75 MG/DL
MCH RBC QN AUTO: 29.7 PG (ref 26.5–33)
MCHC RBC AUTO-ENTMCNC: 33.9 G/DL (ref 31.5–36.5)
MCV RBC AUTO: 88 FL (ref 78–100)
NONHDLC SERPL-MCNC: 107 MG/DL
O2/TOTAL GAS SETTING VFR VENT: 1 %
PCO2 BLDV: 39 MM HG (ref 40–50)
PH BLDV: 7.4 [PH] (ref 7.32–7.43)
PLATELET # BLD AUTO: 260 10E3/UL (ref 150–450)
PO2 BLDV: 64 MM HG (ref 25–47)
POTASSIUM BLD-SCNC: 3.5 MMOL/L (ref 3.4–5.3)
POTASSIUM BLD-SCNC: 4.2 MMOL/L (ref 3.4–5.3)
RBC # BLD AUTO: 4.27 10E6/UL (ref 3.8–5.2)
SODIUM SERPL-SCNC: 138 MMOL/L (ref 133–144)
TRIGL SERPL-MCNC: 158 MG/DL
UFH PPP CHRO-ACNC: 0.23 IU/ML
UFH PPP CHRO-ACNC: 0.43 IU/ML
WBC # BLD AUTO: 5.2 10E3/UL (ref 4–11)

## 2022-02-28 PROCEDURE — 36415 COLL VENOUS BLD VENIPUNCTURE: CPT | Performed by: NURSE PRACTITIONER

## 2022-02-28 PROCEDURE — 93571 IV DOP VEL&/PRESS C FLO 1ST: CPT | Mod: 26 | Performed by: INTERNAL MEDICINE

## 2022-02-28 PROCEDURE — 93454 CORONARY ARTERY ANGIO S&I: CPT | Performed by: INTERNAL MEDICINE

## 2022-02-28 PROCEDURE — C1769 GUIDE WIRE: HCPCS | Performed by: INTERNAL MEDICINE

## 2022-02-28 PROCEDURE — 85018 HEMOGLOBIN: CPT | Performed by: NURSE PRACTITIONER

## 2022-02-28 PROCEDURE — 93571 IV DOP VEL&/PRESS C FLO 1ST: CPT | Performed by: INTERNAL MEDICINE

## 2022-02-28 PROCEDURE — 258N000003 HC RX IP 258 OP 636: Performed by: INTERNAL MEDICINE

## 2022-02-28 PROCEDURE — 85027 COMPLETE CBC AUTOMATED: CPT | Performed by: INTERNAL MEDICINE

## 2022-02-28 PROCEDURE — 99232 SBSQ HOSP IP/OBS MODERATE 35: CPT | Performed by: INTERNAL MEDICINE

## 2022-02-28 PROCEDURE — 250N000011 HC RX IP 250 OP 636: Performed by: INTERNAL MEDICINE

## 2022-02-28 PROCEDURE — 210N000002 HC R&B HEART CARE

## 2022-02-28 PROCEDURE — 82310 ASSAY OF CALCIUM: CPT | Performed by: NURSE PRACTITIONER

## 2022-02-28 PROCEDURE — C9600 PERC DRUG-EL COR STENT SING: HCPCS | Performed by: INTERNAL MEDICINE

## 2022-02-28 PROCEDURE — C1887 CATHETER, GUIDING: HCPCS | Performed by: INTERNAL MEDICINE

## 2022-02-28 PROCEDURE — 85347 COAGULATION TIME ACTIVATED: CPT

## 2022-02-28 PROCEDURE — 85520 HEPARIN ASSAY: CPT | Performed by: INTERNAL MEDICINE

## 2022-02-28 PROCEDURE — 99152 MOD SED SAME PHYS/QHP 5/>YRS: CPT | Performed by: INTERNAL MEDICINE

## 2022-02-28 PROCEDURE — C1874 STENT, COATED/COV W/DEL SYS: HCPCS | Performed by: INTERNAL MEDICINE

## 2022-02-28 PROCEDURE — 84132 ASSAY OF SERUM POTASSIUM: CPT | Performed by: INTERNAL MEDICINE

## 2022-02-28 PROCEDURE — 36415 COLL VENOUS BLD VENIPUNCTURE: CPT | Performed by: INTERNAL MEDICINE

## 2022-02-28 PROCEDURE — 93454 CORONARY ARTERY ANGIO S&I: CPT | Mod: 26 | Performed by: INTERNAL MEDICINE

## 2022-02-28 PROCEDURE — 027034Z DILATION OF CORONARY ARTERY, ONE ARTERY WITH DRUG-ELUTING INTRALUMINAL DEVICE, PERCUTANEOUS APPROACH: ICD-10-PCS | Performed by: INTERNAL MEDICINE

## 2022-02-28 PROCEDURE — 4A033BC MEASUREMENT OF ARTERIAL PRESSURE, CORONARY, PERCUTANEOUS APPROACH: ICD-10-PCS | Performed by: INTERNAL MEDICINE

## 2022-02-28 PROCEDURE — 99153 MOD SED SAME PHYS/QHP EA: CPT | Performed by: INTERNAL MEDICINE

## 2022-02-28 PROCEDURE — 99291 CRITICAL CARE FIRST HOUR: CPT | Performed by: NURSE PRACTITIONER

## 2022-02-28 PROCEDURE — 250N000013 HC RX MED GY IP 250 OP 250 PS 637: Performed by: INTERNAL MEDICINE

## 2022-02-28 PROCEDURE — 250N000011 HC RX IP 250 OP 636: Performed by: HOSPITALIST

## 2022-02-28 PROCEDURE — 82803 BLOOD GASES ANY COMBINATION: CPT | Performed by: NURSE PRACTITIONER

## 2022-02-28 PROCEDURE — 92928 PRQ TCAT PLMT NTRAC ST 1 LES: CPT | Mod: LD | Performed by: INTERNAL MEDICINE

## 2022-02-28 PROCEDURE — 80061 LIPID PANEL: CPT | Performed by: INTERNAL MEDICINE

## 2022-02-28 PROCEDURE — 99233 SBSQ HOSP IP/OBS HIGH 50: CPT | Mod: 25 | Performed by: NURSE PRACTITIONER

## 2022-02-28 PROCEDURE — C1894 INTRO/SHEATH, NON-LASER: HCPCS | Performed by: INTERNAL MEDICINE

## 2022-02-28 PROCEDURE — 272N000001 HC OR GENERAL SUPPLY STERILE: Performed by: INTERNAL MEDICINE

## 2022-02-28 PROCEDURE — C1725 CATH, TRANSLUMIN NON-LASER: HCPCS | Performed by: INTERNAL MEDICINE

## 2022-02-28 PROCEDURE — 93005 ELECTROCARDIOGRAM TRACING: CPT

## 2022-02-28 PROCEDURE — 99207 PR SC NO CHARGE VISIT: CPT | Performed by: NURSE PRACTITIONER

## 2022-02-28 PROCEDURE — B2111ZZ FLUOROSCOPY OF MULTIPLE CORONARY ARTERIES USING LOW OSMOLAR CONTRAST: ICD-10-PCS | Performed by: INTERNAL MEDICINE

## 2022-02-28 PROCEDURE — 250N000009 HC RX 250: Performed by: INTERNAL MEDICINE

## 2022-02-28 DEVICE — STENT CORONARY SYNERGY XD MR US 2.50X48MM H7493941848250: Type: IMPLANTABLE DEVICE | Status: FUNCTIONAL

## 2022-02-28 RX ORDER — METOPROLOL TARTRATE 1 MG/ML
5 INJECTION, SOLUTION INTRAVENOUS
Status: DISCONTINUED | OUTPATIENT
Start: 2022-02-28 | End: 2022-02-28

## 2022-02-28 RX ORDER — LIDOCAINE 40 MG/G
CREAM TOPICAL
Status: DISCONTINUED | OUTPATIENT
Start: 2022-02-28 | End: 2022-02-28

## 2022-02-28 RX ORDER — NITROGLYCERIN 5 MG/ML
VIAL (ML) INTRAVENOUS
Status: DISCONTINUED | OUTPATIENT
Start: 2022-02-28 | End: 2022-02-28 | Stop reason: HOSPADM

## 2022-02-28 RX ORDER — FENTANYL CITRATE 50 UG/ML
25 INJECTION, SOLUTION INTRAMUSCULAR; INTRAVENOUS
Status: DISCONTINUED | OUTPATIENT
Start: 2022-02-28 | End: 2022-03-01 | Stop reason: HOSPADM

## 2022-02-28 RX ORDER — ATORVASTATIN CALCIUM 40 MG/1
40 TABLET, FILM COATED ORAL DAILY
Qty: 90 TABLET | Refills: 3 | Status: SHIPPED | OUTPATIENT
Start: 2022-02-28 | End: 2022-03-10

## 2022-02-28 RX ORDER — ACETAMINOPHEN 325 MG/1
650 TABLET ORAL EVERY 4 HOURS PRN
Status: DISCONTINUED | OUTPATIENT
Start: 2022-02-28 | End: 2022-03-01 | Stop reason: HOSPADM

## 2022-02-28 RX ORDER — ASPIRIN 81 MG/1
81 TABLET, CHEWABLE ORAL ONCE
Status: DISCONTINUED | OUTPATIENT
Start: 2022-02-28 | End: 2022-02-28

## 2022-02-28 RX ORDER — LORAZEPAM 2 MG/ML
0.5 INJECTION INTRAMUSCULAR
Status: DISCONTINUED | OUTPATIENT
Start: 2022-02-28 | End: 2022-02-28 | Stop reason: HOSPADM

## 2022-02-28 RX ORDER — SODIUM CHLORIDE 9 MG/ML
INJECTION, SOLUTION INTRAVENOUS CONTINUOUS
Status: ACTIVE | OUTPATIENT
Start: 2022-02-28 | End: 2022-02-28

## 2022-02-28 RX ORDER — PROCHLORPERAZINE 25 MG
25 SUPPOSITORY, RECTAL RECTAL EVERY 12 HOURS PRN
Status: DISCONTINUED | OUTPATIENT
Start: 2022-02-28 | End: 2022-03-01 | Stop reason: HOSPADM

## 2022-02-28 RX ORDER — ASPIRIN 81 MG/1
243 TABLET, CHEWABLE ORAL ONCE
Status: COMPLETED | OUTPATIENT
Start: 2022-02-28 | End: 2022-02-28

## 2022-02-28 RX ORDER — ONDANSETRON 2 MG/ML
4 INJECTION INTRAMUSCULAR; INTRAVENOUS EVERY 6 HOURS PRN
Status: DISCONTINUED | OUTPATIENT
Start: 2022-02-28 | End: 2022-03-01 | Stop reason: HOSPADM

## 2022-02-28 RX ORDER — ATROPINE SULFATE 0.1 MG/ML
0.5 INJECTION INTRAVENOUS EVERY 5 MIN PRN
Status: DISCONTINUED | OUTPATIENT
Start: 2022-02-28 | End: 2022-03-01 | Stop reason: HOSPADM

## 2022-02-28 RX ORDER — ASPIRIN 325 MG
325 TABLET ORAL ONCE
Status: COMPLETED | OUTPATIENT
Start: 2022-02-28 | End: 2022-02-28

## 2022-02-28 RX ORDER — OXYCODONE HYDROCHLORIDE 5 MG/1
10 TABLET ORAL EVERY 4 HOURS PRN
Status: DISCONTINUED | OUTPATIENT
Start: 2022-02-28 | End: 2022-03-01 | Stop reason: HOSPADM

## 2022-02-28 RX ORDER — NALOXONE HYDROCHLORIDE 0.4 MG/ML
0.4 INJECTION, SOLUTION INTRAMUSCULAR; INTRAVENOUS; SUBCUTANEOUS
Status: ACTIVE | OUTPATIENT
Start: 2022-02-28 | End: 2022-03-01

## 2022-02-28 RX ORDER — LORAZEPAM 0.5 MG/1
0.5 TABLET ORAL
Status: DISCONTINUED | OUTPATIENT
Start: 2022-02-28 | End: 2022-02-28 | Stop reason: HOSPADM

## 2022-02-28 RX ORDER — NITROGLYCERIN 0.4 MG/1
0.4 TABLET SUBLINGUAL EVERY 5 MIN PRN
Status: DISCONTINUED | OUTPATIENT
Start: 2022-02-28 | End: 2022-03-01 | Stop reason: HOSPADM

## 2022-02-28 RX ORDER — ONDANSETRON 4 MG/1
4 TABLET, ORALLY DISINTEGRATING ORAL EVERY 6 HOURS PRN
Status: DISCONTINUED | OUTPATIENT
Start: 2022-02-28 | End: 2022-03-01 | Stop reason: HOSPADM

## 2022-02-28 RX ORDER — SODIUM CHLORIDE 9 MG/ML
INJECTION, SOLUTION INTRAVENOUS CONTINUOUS
Status: DISCONTINUED | OUTPATIENT
Start: 2022-02-28 | End: 2022-02-28 | Stop reason: HOSPADM

## 2022-02-28 RX ORDER — ASPIRIN 81 MG/1
81 TABLET ORAL DAILY
Status: DISCONTINUED | OUTPATIENT
Start: 2022-03-01 | End: 2022-03-01 | Stop reason: HOSPADM

## 2022-02-28 RX ORDER — VERAPAMIL HYDROCHLORIDE 2.5 MG/ML
INJECTION, SOLUTION INTRAVENOUS
Status: DISCONTINUED | OUTPATIENT
Start: 2022-02-28 | End: 2022-02-28 | Stop reason: HOSPADM

## 2022-02-28 RX ORDER — LISINOPRIL 5 MG/1
5 TABLET ORAL DAILY
Status: DISCONTINUED | OUTPATIENT
Start: 2022-02-28 | End: 2022-02-28

## 2022-02-28 RX ORDER — NALOXONE HYDROCHLORIDE 0.4 MG/ML
0.2 INJECTION, SOLUTION INTRAMUSCULAR; INTRAVENOUS; SUBCUTANEOUS
Status: ACTIVE | OUTPATIENT
Start: 2022-02-28 | End: 2022-03-01

## 2022-02-28 RX ORDER — PANTOPRAZOLE SODIUM 40 MG/1
40 TABLET, DELAYED RELEASE ORAL
Status: DISCONTINUED | OUTPATIENT
Start: 2022-02-28 | End: 2022-02-28

## 2022-02-28 RX ORDER — POTASSIUM CHLORIDE 1500 MG/1
20 TABLET, EXTENDED RELEASE ORAL
Status: DISCONTINUED | OUTPATIENT
Start: 2022-02-28 | End: 2022-02-28 | Stop reason: HOSPADM

## 2022-02-28 RX ORDER — FLUMAZENIL 0.1 MG/ML
0.2 INJECTION, SOLUTION INTRAVENOUS
Status: ACTIVE | OUTPATIENT
Start: 2022-02-28 | End: 2022-03-01

## 2022-02-28 RX ORDER — IOPAMIDOL 755 MG/ML
INJECTION, SOLUTION INTRAVASCULAR
Status: DISCONTINUED | OUTPATIENT
Start: 2022-02-28 | End: 2022-02-28 | Stop reason: HOSPADM

## 2022-02-28 RX ORDER — PROCHLORPERAZINE MALEATE 5 MG
5 TABLET ORAL EVERY 6 HOURS PRN
Status: DISCONTINUED | OUTPATIENT
Start: 2022-02-28 | End: 2022-03-01 | Stop reason: HOSPADM

## 2022-02-28 RX ORDER — HEPARIN SODIUM 1000 [USP'U]/ML
INJECTION, SOLUTION INTRAVENOUS; SUBCUTANEOUS
Status: DISCONTINUED | OUTPATIENT
Start: 2022-02-28 | End: 2022-02-28 | Stop reason: HOSPADM

## 2022-02-28 RX ORDER — FENTANYL CITRATE 50 UG/ML
INJECTION, SOLUTION INTRAMUSCULAR; INTRAVENOUS
Status: DISCONTINUED | OUTPATIENT
Start: 2022-02-28 | End: 2022-02-28 | Stop reason: HOSPADM

## 2022-02-28 RX ORDER — ATROPINE SULFATE 0.1 MG/ML
0.5 INJECTION INTRAVENOUS
Status: ACTIVE | OUTPATIENT
Start: 2022-02-28 | End: 2022-03-01

## 2022-02-28 RX ORDER — HYDRALAZINE HYDROCHLORIDE 20 MG/ML
10 INJECTION INTRAMUSCULAR; INTRAVENOUS EVERY 4 HOURS PRN
Status: DISCONTINUED | OUTPATIENT
Start: 2022-02-28 | End: 2022-03-01 | Stop reason: HOSPADM

## 2022-02-28 RX ORDER — OXYCODONE HYDROCHLORIDE 5 MG/1
5 TABLET ORAL EVERY 4 HOURS PRN
Status: DISCONTINUED | OUTPATIENT
Start: 2022-02-28 | End: 2022-03-01 | Stop reason: HOSPADM

## 2022-02-28 RX ADMIN — TICAGRELOR 90 MG: 90 TABLET ORAL at 21:29

## 2022-02-28 RX ADMIN — PROCHLORPERAZINE EDISYLATE 5 MG: 5 INJECTION INTRAMUSCULAR; INTRAVENOUS at 19:57

## 2022-02-28 RX ADMIN — SODIUM CHLORIDE: 9 INJECTION, SOLUTION INTRAVENOUS at 18:05

## 2022-02-28 RX ADMIN — METOPROLOL TARTRATE 12.5 MG: 25 TABLET, FILM COATED ORAL at 08:02

## 2022-02-28 RX ADMIN — ONDANSETRON 4 MG: 2 INJECTION INTRAMUSCULAR; INTRAVENOUS at 18:50

## 2022-02-28 RX ADMIN — OMEPRAZOLE 20 MG: 20 CAPSULE, DELAYED RELEASE ORAL at 08:03

## 2022-02-28 RX ADMIN — HEPARIN SODIUM 1050 UNITS/HR: 1000 INJECTION INTRAVENOUS; SUBCUTANEOUS at 08:02

## 2022-02-28 RX ADMIN — ASPIRIN 325 MG ORAL TABLET 325 MG: 325 PILL ORAL at 07:55

## 2022-02-28 RX ADMIN — HEPARIN SODIUM 1050 UNITS/HR: 1000 INJECTION INTRAVENOUS; SUBCUTANEOUS at 07:56

## 2022-02-28 RX ADMIN — SODIUM CHLORIDE: 9 INJECTION, SOLUTION INTRAVENOUS at 08:07

## 2022-02-28 ASSESSMENT — ACTIVITIES OF DAILY LIVING (ADL)
ADLS_ACUITY_SCORE: 12

## 2022-02-28 NOTE — PLAN OF CARE
Care plan note:      Recent Vitals:  Temp: 97.8  F (36.6  C) Temp src: Oral BP: 107/62 Pulse: 60   Resp: 20 SpO2: 98 % O2 Device: None (Room air)         Orientation/Neuro: Alert and Oriented x 4  Pain: The patient is not having any pain.              Tele: Sinus rhythm               IV medications: Heparin              Mobility: up Independently              Skin: With in normal limits              GI: WDL  : WDL                 Diet: Tolerating diet:   Well  Orders Placed This Encounter      Low Fat Diet                 Safety/Concerns:  None  Aggression Color: Green     Plan: no chest pain. Cards following, planing angio today.  Still needs consent. Following K+ protocol. Heparin re-check this Am.               Continue to monitor.        Zully Buckley RN

## 2022-02-28 NOTE — PROGRESS NOTES
[facility]  Cardiology Progress Note  Date of Service: 02/28/2022  Primary Cardiologist: New to cardiolgoy    Assessment & Plan   Rubi Nicolas is a 61 year old female with past medical history significant for HTN, DM, HLP, GERD admitted on 2/25/2022 with NSTEMI.    Interval History:  No further symptoms of chest discomfort. Intermittent lightheadedness and dizziness.     COVID status: Negative     Assessment:   1. NSTEMI    Troponin peak at 77    Echo LVEF 60-65% without wall motion or valvular abnormalities     2. Hypertension    [PTA: lisinopril 5 mg daily]    Controlled and soft on lisinopril 10 mg daily and metoprolol 12.5 mg BID    3. Hyperlipidemia    [PTA: Simvastatin ]    FLP 2/27: , HDL 62, LDL 48, .    4. T2DM    5. GERD    PTA PPI    Plan:  1. Decrease lisinopril to 5 mg daily at night  2. Coronary angiogram today  3. Upgrade statin per findings of angio    All risks and benefits for this procedure have been explained to this patient and accepted.  This includes but is not limited to death, heart attack, stroke, blood clots, cardiac or vascular perforation, bleeding including the need for blood transfusion and the risk thereof, allergic reaction to x-ray dye, arrhythmia necessitating cardioversion, contrast dye nephropathy (including risks of temporary or permanent dialysis).  We talked about intracoronary stenting and the risks thereof and bypass surgery.      No history of bleeding problems or current bleeding, and no scheduled surgeries or procedures in the next year. Patient understands and wishes to proceed with it.    This assessment and plan was formulated under the guidance of Dr. High.    A total of 15 minutes was spent face-to-face or coordinating care of Rubi Nicolas. Over 50% of our time was spent counseling the patient and/or coordinating care.    DULCE MARIA JACOBSON CNP  Pager:  [phone]   (8 am - 5pm, M-F)    Physical Exam    Temp: 98.3  F (36.8  C) Temp src: Oral BP: 98/60 Pulse: 58   Resp: 18 SpO2: 99 % O2 Device: None (Room air)    Vitals:    02/26/22 0452 02/27/22 0149 02/28/22 0018   Weight: 64 kg (141 lb) 63.1 kg (139 lb 3.2 oz) 63.4 kg (139 lb 12.8 oz)       Constitutional:   NAD    Skin:   Warm and dry   Head:   Nontraumatic   Neck:   no JVD   Lungs:   symmetric, clear to auscultation   Cardiovascular:   regular rate and rhythm   Abdomen:   Benign    Extremities and Back:   No edema   Neurological:   Grossly nonfocal     Medications     Continuing ACE inhibitor/ARB/ARNI from home medication list OR ACE inhibitor/ARB order already placed during this visit       - MEDICATION INSTRUCTIONS -       heparin 1,050 Units/hr (02/28/22 0802)     - MEDICATION INSTRUCTIONS -       - MEDICATION INSTRUCTIONS -       - MEDICATION INSTRUCTIONS -       sodium chloride 150 mL/hr at 02/28/22 0807       aspirin  81 mg Oral Daily     lisinopril  10 mg Oral Daily     metoprolol tartrate  12.5 mg Oral BID     pantoprazole  40 mg Oral QAM AC     simvastatin  60 mg Oral At Bedtime     sodium chloride (PF)  3 mL Intracatheter Q8H       Code Status    Full Code    Allergies   Allergies   Allergen Reactions     Keflex [Cephalexin Hcl] Hives     Penicillins Hives     Ragweeds      Sneezing etc.       Clinically Significant Risk Factors Present on Admission           Fluid & Electrolyte Disorders: Hypokalemia

## 2022-02-28 NOTE — PROGRESS NOTES
"[facility]    Internal Medicine Hospitalist Progress Note  02/28/2022  I evaluated patient on the above date.    Papa Cortez Jr., MD  [phone] (p)  Text Page  Vocera        Assessment & Plan New actions/orders today (02/28/2022) are underlined.    Rubi Nicolas is a 61 year old female with history including DM2, HTN and DLD, mathieu presented 2/25/2022 with chest pain and found with trop elevation.    NSTEMI  Hypertension (benign essential).  [PTA: lisinopril 5 mg daily.]  * Presented 2/25 with intermittent chest pain over last day or so PTA, lasting seconds. On initial evaluation 2/25, was mildly hypertensive; ECG showed SR w/o acute ischemic changes; trop 77. CXR 2/25 negative. Started on heparin gtt and metoprolol an admit. Continued on ASA and simvastatin on admit. Lisinopril increased on admit. Cardiology consulted on admit.  * Follow-up trops \"flat\". Echo 2/26 showed LVEF 60-65%, no regional wall motion abnormalities; RV OK.  * Lisinopril decreased back to home dose 2/28.  Recent Labs   Lab 02/26/22  0704 02/26/22  0410 02/25/22  1857   TROPONINIS 68* 71* 77*   - Plan angiogram today 2/28.  - Continue heparin gtt.  - Continue ASA, simvastatin.  - Continue lisinopril 5 mg daily; metoprolol 12.5 mg BID.  - Appreciate Cardiology help.    DM II.  [PTA: glimepiride 6 mg daily, metformin 1000 mg BID.]  * Most recent A1C 6.8% 8/2021.   Recent Labs   Lab 02/28/22  0839 02/27/22  0539 02/25/22  1857   * 176* 132*   - Continue carb controlled diet.  - Monitor glucose levels.  - Hold PTA glimepiride and metformin for now.    DLD.  * PTA on simvastatin.  * FLP 2/27: , HDL 62, LDL 48, .  - Continue simvastatin.    Hypokalemia.  * Potassium low on admit 2/25. Treated with replacement.  Recent Labs   Lab 02/28/22  0635 02/27/22  0539 02/26/22  0410 02/25/22  1857   POTASSIUM 4.2 3.9 3.7 3.3*   - Continue PRN K replacement.    GERD.  * Chronic and stable.  - Continue " "omeprazole/pantoprazole.    Clinically Significant Risk Factors Present on Admission              # Overweight: Estimated body mass index is 25.57 kg/m  as calculated from the following:    Height as of 2/14/22: 1.575 m (5' 2\").    Weight as of this encounter: 63.4 kg (139 lb 12.8 oz).        COVID-19 testing.  COVID-19 PCR Results    COVID-19 PCR Results 2/11/22 2/26/22   SARS CoV2 PCR Negative Negative      Comments are available for some flowsheets but are not being displayed.         COVID-19 Antibody Results, Testing for Immunity    COVID-19 Antibody Results, Testing for Immunity   No data to display.             Diet: NPO for Medical/Clinical Reasons Except for: Meds    Prophylaxis: PCD's, ambulation. On heparin gtt.  Castro Catheter: Not present  Central Lines: None  Code Status: Full Code    Disposition Plan   Expected discharge: 1-2d recommended to prior living arrangement pending cardiac workup.  Entered: Papa Cortez MD 02/28/2022, 10:04 AM         Interval History   Doing OK.  No chest pain.    -Data reviewed today: I reviewed all new labs and imaging over the last 24 hours. I personally reviewed no images or EKG's today.    Physical Exam    , Blood pressure 98/60, pulse 58, temperature 98.3  F (36.8  C), temperature source Oral, resp. rate 18, weight 63.4 kg (139 lb 12.8 oz), SpO2 99 %, not currently breastfeeding.  Vitals:    02/26/22 0452 02/27/22 0149 02/28/22 0018   Weight: 64 kg (141 lb) 63.1 kg (139 lb 3.2 oz) 63.4 kg (139 lb 12.8 oz)     Vital Signs with Ranges  Temp:  [97.8  F (36.6  C)-98.3  F (36.8  C)] 98.3  F (36.8  C)  Pulse:  [58-72] 58  Resp:  [16-20] 18  BP: ()/(54-79) 98/60  SpO2:  [96 %-99 %] 99 %  Patient Vitals for the past 24 hrs:   BP Temp Temp src Pulse Resp SpO2 Weight   02/28/22 0759 98/60 98.3  F (36.8  C) Oral 58 18 99 % --   02/28/22 0018 107/62 97.8  F (36.6  C) Oral 60 20 98 % 63.4 kg (139 lb 12.8 oz)   02/27/22 1940 113/54 97.9  F (36.6  C) Oral 67 16 96 % -- "   02/27/22 1536 127/79 98.2  F (36.8  C) Oral 72 16 97 % --     I/O's Last 24 hours  I/O last 3 completed shifts:  In: 999 [P.O.:720; I.V.:279]  Out: -     Constitutional: Awake, alert, pleasant, conversant.  Respiratory: Grossly clear anteriorly, no crackles or wheezes.  Cardiovascular: RRR, no m/r/g.  GI:   Skin/Integumen:   Other:        Data   Recent Labs   Lab 02/28/22 0839 02/28/22 0635 02/27/22 0539 02/26/22  0704 02/26/22  0410 02/25/22 1857   WBC  --  5.2 6.0  --   --  7.2   HGB  --  12.7 12.2  --   --  12.9   MCV  --  88 89  --   --  89   PLT  --  260 251  --   --  275   NA  --   --  138  --   --  134   POTASSIUM  --  4.2 3.9  --  3.7 3.3*   CHLORIDE  --   --  108  --   --  102   CO2  --   --  28  --   --  27   BUN  --   --  16  --   --  14   CR  --   --  0.71  --   --  0.67   ANIONGAP  --   --  2*  --   --  5   CHRISTOPHER  --   --  9.5  --   --  9.5   *  --  176*  --   --  132*   ALBUMIN  --   --   --   --   --  4.0   PROTTOTAL  --   --   --   --   --  7.7   BILITOTAL  --   --   --   --   --  0.3   ALKPHOS  --   --   --   --   --  42   ALT  --   --   --   --   --  33   AST  --   --   --   --   --  17   TROPONINIS  --   --   --  68* 71* 77*     Recent Labs   Lab Test 02/28/22 0839 02/27/22 0539 02/25/22 1857 12/01/20  0841 01/24/20  0829   * 176* 132* 189* 199*     Recent Labs   Lab 02/28/22 0635 02/27/22 0539 02/25/22 1857   WBC 5.2 6.0 7.2         No results found for this or any previous visit (from the past 24 hour(s)).    Medications   All medications were reviewed.    Continuing ACE inhibitor/ARB/ARNI from home medication list OR ACE inhibitor/ARB order already placed during this visit       - MEDICATION INSTRUCTIONS -       heparin 1,050 Units/hr (02/28/22 0802)     - MEDICATION INSTRUCTIONS -       - MEDICATION INSTRUCTIONS -       - MEDICATION INSTRUCTIONS -       sodium chloride 75 mL/hr at 02/28/22 0953       aspirin  81 mg Oral Daily     lisinopril  5 mg Oral Daily      metoprolol tartrate  12.5 mg Oral BID     pantoprazole  40 mg Oral QAM AC     simvastatin  60 mg Oral At Bedtime     sodium chloride (PF)  3 mL Intracatheter Q8H     acetaminophen **OR** acetaminophen, Continuing ACE inhibitor/ARB/ARNI from home medication list OR ACE inhibitor/ARB order already placed during this visit, - MEDICATION INSTRUCTIONS -, lidocaine 4%, lidocaine (buffered or not buffered), LORazepam **OR** LORazepam, magnesium hydroxide, - MEDICATION INSTRUCTIONS -, melatonin, ondansetron **OR** ondansetron, - MEDICATION INSTRUCTIONS -, - MEDICATION INSTRUCTIONS -, potassium chloride, senna-docusate **OR** senna-docusate, sodium chloride (PF), sodium chloride (PF)

## 2022-02-28 NOTE — PROVIDER NOTIFICATION
MD Notification    Notified Person: MD    Notified Person Name: Dr. Powell     Notification Date/Time: 2/28 1700    Notification Interaction: text page      Purpose of Notification: Pt  Aleksey is requesting a phone call from the interventionalist [phone]. thanks     Comments: Dr. Powell did update pt  Aleksey.

## 2022-02-28 NOTE — PROCEDURES
[facility]    Procedure: *Cath with PCI    Date/Time: 2/28/2022 4:33 PM  Performed by: Jh Powell MD  Authorized by: Jh Powell MD       UNIVERSAL PROTOCOL   Site Marked: Yes  Prior Images Obtained and Reviewed:  Yes  Required items: Required blood products, implants, devices and special equipment available    Patient identity confirmed:  Verbally with patient  Patient was reevaluated immediately before administering moderate or deep sedation or anesthesia  Confirmation Checklist:  Patient's identity using two indicators  Time out: Immediately prior to the procedure a time out was called    Universal Protocol: the [facility] Universal Protocol was followed    Preparation: Patient was prepped and draped in usual sterile fashion       ANESTHESIA    Local Anesthetic: Lidocaine 1% without epinephrine      SEDATION  Patient Sedated: Yes    Sedation:  Midazolam and fentanyl  Vital signs: Vital signs monitored during sedation      PROCEDURE  Describe Procedure: Procedure  1) CAG  2) IFR mid LAD 0.73  3) PCI mid LAD 48 x 2.5 mm everolimus eluting Synergy XD stent, post dilated to 18 AXEL with 2.5 noncompliant balloon    Findings   LMCA no significant stenosis  CX no significant stenosis  LAD long tortuous calcified mid vessel lesion ( 70 to 75%) markedly abnormal IFR 0.75  RCA dominant Tortuous distal 50% lesion    The long mid LAD lesion was very challenging, finally able to cross lesion with low profile Takeru 1.5 balloon and exchanging for a Terumo run through wire using a microcatheter, then serial dilations with 2.0 then 2.5 balloon. We placed a 48 x 2.5 mm zotarolimus eluting Medtronic LINDEN stent, post dilated to 18 AXEL with noncompliant balloon and excellent final result.     Plan  Asa  ticagrelor  Ideal bp and lipid control    Andre  Patient Tolerance:  Patient tolerated the procedure well with no immediate complications  Length of time physician/provider  present for 1:1 monitoring during sedation: 75

## 2022-02-28 NOTE — PRE-PROCEDURE
GENERAL PRE-PROCEDURE:   Procedure:  CAG possible PCI  Date/Time:  2/28/2022 3:05 PM    Verbal consent obtained?: Yes    Written consent obtained?: Yes    Risks and benefits: Risks, benefits and alternatives were discussed    Consent given by:  Patient  Patient states understanding of procedure being performed: Yes    Patient's understanding of procedure matches consent: Yes    Procedure consent matches procedure scheduled: Yes    Expected level of sedation:  Moderate  Appropriately NPO:  Yes  ASA Class:  2  Mallampati  :  Grade 2- soft palate, base of uvula, tonsillar pillars, and portion of posterior pharyngeal wall visible  Lungs:  Lungs clear with good breath sounds bilaterally  History & Physical reviewed:  History and physical reviewed and no updates needed  Statement of review:  I have reviewed the lab findings, diagnostic data, medications, and the plan for sedation  I have examined the patient, reviewed the history, medications and pre procedural tests.She has sustained a NSTEMI I have explained to the patient the risks of death, MI, stroke, hematoma, possible urgent bypass surgery for failed PCI, use of stents, thienopyridine agents, possible peripheral vascular complications, arrhythmia, the use of FFR in clinical decision-making and alternative of medical therapy alone in regards to left heart catheterization, left ventriculography, coronary angiography, and possible percutaneous coronary intervention. The patient voiced understanding and wishes to proceed. The patient has a good right radial pulse, normal ulnar pulse and a normal Rainer's sign.

## 2022-03-01 ENCOUNTER — APPOINTMENT (OUTPATIENT)
Dept: PHYSICAL THERAPY | Facility: CLINIC | Age: 62
DRG: 247 | End: 2022-03-01
Attending: INTERNAL MEDICINE
Payer: COMMERCIAL

## 2022-03-01 VITALS
BODY MASS INDEX: 25.15 KG/M2 | HEART RATE: 82 BPM | RESPIRATION RATE: 14 BRPM | SYSTOLIC BLOOD PRESSURE: 128 MMHG | WEIGHT: 137.5 LBS | DIASTOLIC BLOOD PRESSURE: 82 MMHG | TEMPERATURE: 97.4 F | OXYGEN SATURATION: 99 %

## 2022-03-01 LAB
ANION GAP SERPL CALCULATED.3IONS-SCNC: 6 MMOL/L (ref 3–14)
BUN SERPL-MCNC: 14 MG/DL (ref 7–30)
CALCIUM SERPL-MCNC: 9.6 MG/DL (ref 8.5–10.1)
CHLORIDE BLD-SCNC: 107 MMOL/L (ref 94–109)
CO2 SERPL-SCNC: 25 MMOL/L (ref 20–32)
CREAT SERPL-MCNC: 0.62 MG/DL (ref 0.52–1.04)
GFR SERPL CREATININE-BSD FRML MDRD: >90 ML/MIN/1.73M2
GLUCOSE BLD-MCNC: 149 MG/DL (ref 70–99)
POTASSIUM BLD-SCNC: 3.7 MMOL/L (ref 3.4–5.3)
SODIUM SERPL-SCNC: 138 MMOL/L (ref 133–144)

## 2022-03-01 PROCEDURE — 82947 ASSAY GLUCOSE BLOOD QUANT: CPT | Performed by: INTERNAL MEDICINE

## 2022-03-01 PROCEDURE — 97161 PT EVAL LOW COMPLEX 20 MIN: CPT | Mod: GP

## 2022-03-01 PROCEDURE — 250N000013 HC RX MED GY IP 250 OP 250 PS 637: Performed by: INTERNAL MEDICINE

## 2022-03-01 PROCEDURE — 97530 THERAPEUTIC ACTIVITIES: CPT | Mod: GP

## 2022-03-01 PROCEDURE — 93005 ELECTROCARDIOGRAM TRACING: CPT

## 2022-03-01 PROCEDURE — 97110 THERAPEUTIC EXERCISES: CPT | Mod: GP

## 2022-03-01 PROCEDURE — 99239 HOSP IP/OBS DSCHRG MGMT >30: CPT | Performed by: INTERNAL MEDICINE

## 2022-03-01 PROCEDURE — 99232 SBSQ HOSP IP/OBS MODERATE 35: CPT | Performed by: NURSE PRACTITIONER

## 2022-03-01 PROCEDURE — 99221 1ST HOSP IP/OBS SF/LOW 40: CPT | Performed by: INTERNAL MEDICINE

## 2022-03-01 PROCEDURE — 250N000013 HC RX MED GY IP 250 OP 250 PS 637: Performed by: NURSE PRACTITIONER

## 2022-03-01 PROCEDURE — 36415 COLL VENOUS BLD VENIPUNCTURE: CPT | Performed by: INTERNAL MEDICINE

## 2022-03-01 RX ORDER — NALOXONE HYDROCHLORIDE 0.4 MG/ML
0.4 INJECTION, SOLUTION INTRAMUSCULAR; INTRAVENOUS; SUBCUTANEOUS
Status: DISCONTINUED | OUTPATIENT
Start: 2022-03-01 | End: 2022-03-01 | Stop reason: HOSPADM

## 2022-03-01 RX ORDER — ATORVASTATIN CALCIUM 40 MG/1
40 TABLET, FILM COATED ORAL EVERY EVENING
Status: DISCONTINUED | OUTPATIENT
Start: 2022-03-01 | End: 2022-03-01 | Stop reason: HOSPADM

## 2022-03-01 RX ORDER — NALOXONE HYDROCHLORIDE 0.4 MG/ML
0.2 INJECTION, SOLUTION INTRAMUSCULAR; INTRAVENOUS; SUBCUTANEOUS
Status: DISCONTINUED | OUTPATIENT
Start: 2022-03-01 | End: 2022-03-01 | Stop reason: HOSPADM

## 2022-03-01 RX ORDER — LISINOPRIL 5 MG/1
5 TABLET ORAL DAILY
Status: DISCONTINUED | OUTPATIENT
Start: 2022-03-01 | End: 2022-03-01 | Stop reason: HOSPADM

## 2022-03-01 RX ORDER — LISINOPRIL 10 MG/1
5 TABLET ORAL DAILY
Start: 2022-03-01 | End: 2022-03-11

## 2022-03-01 RX ORDER — NITROGLYCERIN 0.4 MG/1
TABLET SUBLINGUAL
Qty: 25 TABLET | Refills: 11 | Status: SHIPPED | OUTPATIENT
Start: 2022-03-01 | End: 2024-06-03

## 2022-03-01 RX ADMIN — LISINOPRIL 5 MG: 5 TABLET ORAL at 09:39

## 2022-03-01 RX ADMIN — ASPIRIN 81 MG: 81 TABLET, COATED ORAL at 09:39

## 2022-03-01 RX ADMIN — TICAGRELOR 90 MG: 90 TABLET ORAL at 09:39

## 2022-03-01 ASSESSMENT — ACTIVITIES OF DAILY LIVING (ADL)
ADLS_ACUITY_SCORE: 4
ADLS_ACUITY_SCORE: 12
ADLS_ACUITY_SCORE: 4

## 2022-03-01 NOTE — PLAN OF CARE
Physical Therapy Discharge Summary    Reason for therapy discharge:    Discharged to home with outpatient therapy.    Progress towards therapy goal(s). See goals on Care Plan in [facility] electronic health record for goal details.  Goals partially met.  Barriers to achieving goals:   discharge from facility.    Therapy recommendation(s):    Continued therapy is recommended.  Rationale/Recommendations:  For further cardiac education and endurance training.

## 2022-03-01 NOTE — PROGRESS NOTES
[facility]  Cardiology Progress Note  Date of Service: 03/01/2022  Primary Cardiologist: New to cardiolgoy    Assessment & Plan   Rubi Nicolas is a 61 year old female with past medical history significant for HTN, DM, HLP, GERD admitted on 2/25/2022 with NSTEMI.    Interval History:  CODE BLUE due to sinus pause and brief episode without a palpable pulse. Brief episode of CPR and return to SR.     COVID status: Negative     Assessment:   1. NSTEMI    Troponin peak at 77    Echo LVEF 60-65% without wall motion or valvular abnormalities    S/p LAD stent 2/28    2. Sinus pause and bradycardia and patient became pulseless     Required brief CPR and return to SR palpable with /70 requiring CODE BLUE    Metoprolol 12.5 mg BID discontinued    3. Hypertension    [PTA: lisinopril 5 mg daily]    Controlled and soft on lisinopril 10 mg daily and metoprolol 12.5 mg BID    4. Hyperlipidemia    [PTA: Simvastatin ]    FLP 2/27: , HDL 62, LDL 48, .    5. T2DM    6. GERD    PTA PPI    Plan:  1. EP consult for sinus pause and bradycardia   2. Restart PTA lisinopril  3. Pharmacy liaison consult for Brilinta cost. Can discharge Brilinta with 30 day free from pharmacy and can change as an outpatient if needed  4. Please discharge on lisinopril, atorvastatin, DAPT (ASA and Brilinta) and SL nitroglycerin    [facility] Heart follow up  Follow up with Beata Larkin on 3/11 at 1230    DULCE MARIA JACOBSON CNP  Pager:  [phone]   (8 am - 5pm, M-F)    Physical Exam   Temp: 97.4  F (36.3  C) Temp src: Oral BP: 121/81 Pulse: 71   Resp: 14 SpO2: 99 % O2 Device: None (Room air) Oxygen Delivery: 4 LPM  Vitals:    02/27/22 0149 02/28/22 0018 03/01/22 0600   Weight: 63.1 kg (139 lb 3.2 oz) 63.4 kg (139 lb 12.8 oz) 62.4 kg (137 lb 8 oz)       Constitutional:   NAD    Skin:   Warm and dry   Head:   Nontraumatic   Neck:   no JVD   Lungs:   symmetric, clear to auscultation   Cardiovascular:    regular rate and rhythm   Abdomen:   Benign    Extremities and Back:   No edema. RRA site CDI without hematoma.    Neurological:   Grossly nonfocal     Medications     - MEDICATION INSTRUCTIONS -       - MEDICATION INSTRUCTIONS -       Percutaneous Coronary Intervention orders placed (this is information for BPA alerting)         aspirin  81 mg Oral Daily     ticagrelor  90 mg Oral Q12H       Code Status    Prior    Allergies   Allergies   Allergen Reactions     Keflex [Cephalexin Hcl] Hives     Penicillins Hives     Ragweeds      Sneezing etc.

## 2022-03-01 NOTE — PROGRESS NOTES
"[facility]    Internal Medicine Hospitalist Progress Note  03/01/2022  I evaluated patient on the above date.    Papa Cortez Jr., MD  [phone] (p)  Text Page  Vocera        Assessment & Plan New actions/orders today (03/01/2022) are underlined.    Rubi Nicolas is a 61 year old female with history including DM2, HTN and DLD, mathieu presented 2/25/2022 with chest pain and found with trop elevation.    NSTEMI - s/p LAD stent 2/28/2022.  Syncope with sinus pauses, suspect vasovagal.  Hypertension (benign essential).  [PTA: lisinopril 5 mg daily.]  * Presented 2/25 with intermittent chest pain over last day or so PTA, lasting seconds. On initial evaluation 2/25, was mildly hypertensive; ECG showed SR w/o acute ischemic changes; trop 77. CXR 2/25 negative. Started on heparin gtt and metoprolol an admit. Continued on ASA and simvastatin on admit. Lisinopril increased on admit. Cardiology consulted on admit.  * Follow-up trops \"flat\". Echo 2/26 showed LVEF 60-65%, no regional wall motion abnormalities; RV OK.  * Lisinopril decreased back to home dose 2/28. Underwent angiogram 2/28 that showed a mLAD 70-75% lesion which was stented. Noted to be in sinus [place] after and metoprolol stopped after angiogram. Later evening 2/28, code blue called for episode of syncope with subsequent bradycardia with concern for asystole; initially noted without pulse and was started on CPR for several seconds with subsequent pulse and 's. Pt subsequently awake and alert; she later said that had just finished dinner and felt the need to urinate, followed by lightheadedness and syncope.  * Simvastatin changed to atorvastatin 3/1. Seen by EP 3/1.  Recent Labs   Lab 02/26/22  0704 02/26/22  0410 02/25/22  1857   TROPONINIS 68* 71* 77*   - Monitor on telemetry.  - Continue ASA, ticagrelor, atorvastatin.  - Continue lisinopril 5 mg daily.  - Appreciate Cardiology help.    DM II.  [PTA: glimepiride 6 mg daily, " "metformin 1000 mg BID.]  * Most recent A1C 6.8% 8/2021.   Recent Labs   Lab 03/01/22  0631 02/28/22  1853 02/28/22  0839 02/27/22  0539 02/25/22  1857   * 161* 170* 176* 132*   - Continue carb controlled diet.  - Monitor glucose levels.  - Resume PTA glimepiride and metformin at discharge.    DLD.  * PTA on simvastatin.  * FLP 2/27: , HDL 62, LDL 48, .  * Simvastatin changed to atorvastatin 3/1.  - Continue atorvastatin.    Hypokalemia.  * Potassium low on admit 2/25. Treated with replacement.  Recent Labs   Lab 03/01/22  0631 02/28/22  1853 02/28/22  0635 02/27/22  0539 02/26/22  0410 02/25/22  1857   POTASSIUM 3.7 3.5 4.2 3.9 3.7 3.3*   - Continue PRN K replacement.    GERD.  * Chronic and stable.  - Continue omeprazole/pantoprazole.    Clinically Significant Risk Factors Present on Admission              # Overweight: Estimated body mass index is 25.15 kg/m  as calculated from the following:    Height as of 2/14/22: 1.575 m (5' 2\").    Weight as of this encounter: 62.4 kg (137 lb 8 oz).        COVID-19 testing.  COVID-19 PCR Results    COVID-19 PCR Results 2/11/22 2/26/22   SARS CoV2 PCR Negative Negative      Comments are available for some flowsheets but are not being displayed.         COVID-19 Antibody Results, Testing for Immunity    COVID-19 Antibody Results, Testing for Immunity   No data to display.             Diet: NPO per Anesthesia Guidelines for Procedure/Surgery Except for: Meds    Prophylaxis: PCD's, ambulation. On heparin gtt.  Castro Catheter: Not present  Central Lines: None  Code Status: Full Code    Disposition Plan   Expected discharge: 1-2d recommended to prior living arrangement pending cardiac workup.  Entered: Papa Cortez MD 03/01/2022, 9:19 AM         Interval History   Acute events last evening reviewed.  Doing well this am.  Tolerating activity.    -Data reviewed today: I reviewed all new labs and imaging over the last 24 hours. I personally reviewed no " images or EKG's today.    Physical Exam    , Blood pressure 121/81, pulse 71, temperature 97.4  F (36.3  C), temperature source Oral, resp. rate 14, weight 62.4 kg (137 lb 8 oz), SpO2 99 %, not currently breastfeeding.  Vitals:    02/27/22 0149 02/28/22 0018 03/01/22 0600   Weight: 63.1 kg (139 lb 3.2 oz) 63.4 kg (139 lb 12.8 oz) 62.4 kg (137 lb 8 oz)     Vital Signs with Ranges  Temp:  [97.4  F (36.3  C)-98.3  F (36.8  C)] 97.4  F (36.3  C)  Pulse:  [52-85] 71  Resp:  [11-24] 14  BP: ()/() 121/81  SpO2:  [93 %-100 %] 99 %  Patient Vitals for the past 24 hrs:   BP Temp Temp src Pulse Resp SpO2 Weight   03/01/22 0800 121/81 97.4  F (36.3  C) Oral 71 14 99 % --   03/01/22 0600 103/77 -- -- 65 20 98 % 62.4 kg (137 lb 8 oz)   03/01/22 0500 -- -- -- 64 17 98 % --   03/01/22 0400 113/74 -- -- 70 17 95 % --   03/01/22 0200 103/62 -- -- 85 24 93 % --   03/01/22 0000 108/54 -- -- 73 22 95 % --   02/28/22 2131 (!) 139/102 -- -- 65 20 99 % --   02/28/22 2100 (!) 152/94 -- -- 66 11 98 % --   02/28/22 2055 -- -- -- 60 17 99 % --   02/28/22 2042 -- -- -- 65 20 100 % --   02/28/22 2030 (!) 153/97 -- -- 60 11 100 % --   02/28/22 2000 (!) 154/89 -- -- 55 20 100 % --   02/28/22 1930 (!) 149/75 -- -- 55 18 100 % --   02/28/22 1909 (!) 153/86 -- -- -- -- 98 % --   02/28/22 1905 (!) 153/86 -- -- 57 -- 100 % --   02/28/22 1900 (!) 153/81 -- -- 59 -- 100 % --   02/28/22 1855 124/77 -- -- 66 -- 100 % --   02/28/22 1850 (!) 146/80 -- -- 61 -- 100 % --   02/28/22 1845 (!) 142/81 -- -- 63 -- 100 % --   02/28/22 1840 (!) 149/78 -- -- 63 -- 100 % --   02/28/22 1835 116/72 -- -- 52 -- 100 % --   02/28/22 1830 98/60 -- -- 56 -- 97 % --   02/28/22 1800 (!) 131/95 -- -- 60 -- 99 % --   02/28/22 1730 129/80 -- -- 57 -- 100 % --   02/28/22 1715 (!) 126/116 -- -- 65 -- 100 % --   02/28/22 1700 (!) 120/92 -- -- 64 -- 98 % --   02/28/22 1645 123/71 97.8  F (36.6  C) Oral -- 18 98 % --   02/28/22 1411 129/75 98.3  F (36.8  C) Oral 67 16 97 %  --     I/O's Last 24 hours  I/O last 3 completed shifts:  In: 0   Out: 1000 [Urine:600; Emesis/NG output:400]    Constitutional: Awake, alert, pleasant, conversant.  Respiratory: Clear, no crackles or wheezes.  Cardiovascular: RRR, no m/r/g.  GI:   Skin/Integumen:   Other:        Data   Recent Labs   Lab 03/01/22 0631 02/28/22 1853 02/28/22  0839 02/28/22  0635 02/27/22  0539 02/26/22  0704 02/26/22  0410 02/25/22  1857   WBC  --   --   --  5.2 6.0  --   --  7.2   HGB  --  11.8  --  12.7 12.2  --   --  12.9   MCV  --   --   --  88 89  --   --  89   PLT  --   --   --  260 251  --   --  275    138  --   --  138  --   --  134   POTASSIUM 3.7 3.5  --  4.2 3.9  --  3.7 3.3*   CHLORIDE 107 108  --   --  108  --   --  102   CO2 25 24  --   --  28  --   --  27   BUN 14 15  --   --  16  --   --  14   CR 0.62 0.62  --   --  0.71  --   --  0.67   ANIONGAP 6 6  --   --  2*  --   --  5   CHRISTOPHER 9.6 8.9  --   --  9.5  --   --  9.5   * 161* 170*  --  176*  --   --  132*   ALBUMIN  --   --   --   --   --   --   --  4.0   PROTTOTAL  --   --   --   --   --   --   --  7.7   BILITOTAL  --   --   --   --   --   --   --  0.3   ALKPHOS  --   --   --   --   --   --   --  42   ALT  --   --   --   --   --   --   --  33   AST  --   --   --   --   --   --   --  17   TROPONINIS  --   --   --   --   --  68* 71* 77*     Recent Labs   Lab Test 03/01/22 0631 02/28/22 1853 02/28/22  0839 02/27/22  0539 02/25/22  1857   * 161* 170* 176* 132*     Recent Labs   Lab 02/28/22  0635 02/27/22  0539 02/25/22  1857   WBC 5.2 6.0 7.2         No results found for this or any previous visit (from the past 24 hour(s)).    Medications   All medications were reviewed.    - MEDICATION INSTRUCTIONS -       - MEDICATION INSTRUCTIONS -       Percutaneous Coronary Intervention orders placed (this is information for BPA alerting)         aspirin  81 mg Oral Daily     atorvastatin  40 mg Oral QPM     lisinopril  5 mg Oral Daily     ticagrelor  90 mg  Oral Q12H     acetaminophen, atropine, - MEDICATION INSTRUCTIONS -, - MEDICATION INSTRUCTIONS -, fentaNYL, HOLD MEDICATION, hydrALAZINE, midazolam, nitroGLYcerin, ondansetron **OR** ondansetron, oxyCODONE **OR** oxyCODONE, Percutaneous Coronary Intervention orders placed (this is information for BPA alerting), prochlorperazine **OR** prochlorperazine **OR** prochlorperazine

## 2022-03-01 NOTE — PLAN OF CARE
[id number]  Pt. Nauseas, received Zofran with no relief. Compazine was ordered and given. Pt. Vomited 400ccs. She stated she felt much better. Atropine put at bedside.

## 2022-03-01 NOTE — PROGRESS NOTES
03/01/22 1000   Quick Adds   Type of Visit Initial PT Evaluation   Living Environment   People in Home spouse   Current Living Arrangements house   Home Accessibility stairs to enter home   Number of Stairs, Main Entrance 2   Stair Railings, Main Entrance railings safe and in good condition;railing on right side (ascending)   Transportation Anticipated family or friend will provide;car, drives self   Self-Care   Usual Activity Tolerance excellent   Current Activity Tolerance good   Regular Exercise Yes   Activity/Exercise Type walking   Exercise Amount/Frequency daily   Equipment Currently Used at Home none   Fall history within last six months no   General Information   Onset of Illness/Injury or Date of Surgery 02/25/22   Referring Physician Dr. Cortez   Patient/Family Therapy Goals Statement (PT) To go home   Pertinent History of Current Problem (include personal factors and/or comorbidities that impact the POC) Pt is a 61 year old male s/p PCI   Existing Precautions/Restrictions cardiac   Cognition   Affect/Mental Status (Cognition) WFL   Orientation Status (Cognition) oriented x 4   Pain Assessment   Patient Currently in Pain No   Range of Motion (ROM)   Range of Motion ROM is WFL   Strength (Manual Muscle Testing)   Strength (Manual Muscle Testing) strength is WFL   Bed Mobility   Bed Mobility no deficits identified   Transfers   Transfers no deficits identified   Gait/Stairs (Locomotion)   [place] Level (Gait) independent   Balance   Balance Comments Good   Clinical Impression   Criteria for Skilled Therapeutic Intervention Yes, treatment indicated   PT Diagnosis (PT) Impaired endurance   Influenced by the following impairments Decreased endurance   Functional limitations due to impairments Difficulty with long distance ambulation   Clinical Presentation (PT Evaluation Complexity) Stable/Uncomplicated   Clinical Presentation Rationale VSS, pain controlled   Clinical Decision Making (Complexity) low  complexity   Planned Therapy Interventions (PT) patient/family education  (Progressive exercises, cardiac risk factors)   Risk & Benefits of therapy have been explained evaluation/treatment results reviewed;care plan/treatment goals reviewed;risks/benefits reviewed;current/potential barriers reviewed;participants voiced agreement with care plan;participants included;patient   PT Discharge Planning   PT Discharge Recommendation (DC Rec) home with outpatient physical therapy   PT Rationale for DC Rec Pt is independent with mobility and safe to discharge home. Pt will benefit from outpatient CR to further increase activity tolerance and for cardiac education.    PT Brief overview of current status Independent   Plan of Care Review   Plan of Care Reviewed With patient   Total Evaluation Time   Total Evaluation Time (Minutes) 10

## 2022-03-01 NOTE — CONSULTS
CARDIAC ELECTROPHYSIOLOGY CONSULTATION  March 1, 2022    REQUESTING PROVIDER:  Dr. AGATHA High and NORA Larkin NP    REASON FOR CONSULTATION: Bradycardia, syncope      HISTORY OF PRESENT ILLNESS:    Thank you for asking EP to evaluate this very pleasant 61-year-old female.  She was admitted over the weekend with chest pain and non-STEMI.  She underwent cardiac catheterization yesterday, 2/28/2022, and underwent LAD PCI.    About 3 hours later, while sitting in the CICU, after having dinner, the patient felt nauseous and hot.  She developed bradycardia on telemetry.  Within a few minutes she went on to have a fainting episode associated with brief asystole (5-6 sec).  She had spontaneous recovery.  RRT was called.  The patient felt mildly nauseated afterwards and very tired.    She has no prior history of syncope.  History of hypertension, dyslipidemia and type 2 diabetes mellitus.      DIAGNOSTIC STUDIES:  Labs: Sodium 138, potassium 3.7, creatinine 0.62, troponin peak 77 (hs)  12-lead ECG: Sinus rhythm, within normal limits  Echocardiogram: Essentially normal, LVEF 60-65%      IMPRESSION:  1. Neurally-mediated (vasovagal) syncope after cardiac catheterization.  Classic presentation and symptomatology, no further evaluation is needed.  Not a contraindication to a beta-blocker, though I would perhaps wait a few days before starting low-dose BB post MI.    RECOMMENDATIONS:    1. As above.  Reassurance.    I appreciate the opportunity to be part of this patient's care.  Please feel free to call me with any questions (pager #[phone]).      Nieves Polanco MD, [facility]        PHYSICAL EXAM:  Vitals: /81 (BP Location: Right arm)   Pulse 71   Temp 97.4  F (36.3  C) (Oral)   Resp 14   Wt 62.4 kg (137 lb 8 oz)   SpO2 99%   BMI 25.15 kg/m      Intake/Output Summary (Last 24 hours) at 3/1/2022 0935  Last data filed at 3/1/2022 0600  Gross per 24 hour   Intake 0 ml   Output 1000 ml   Net -1000 ml     Vitals:     02/25/22 2300 02/26/22 0452 02/27/22 0149 02/28/22 0018   Weight: 63.5 kg (140 lb) 64 kg (141 lb) 63.1 kg (139 lb 3.2 oz) 63.4 kg (139 lb 12.8 oz)    03/01/22 0600   Weight: 62.4 kg (137 lb 8 oz)     Constitutional:  Alert and oriented x3.  Pt appears comfortable, has no CP or respiratory distress.  Head: Normocephalic and atraumatic.   Skin:  Normal color and texture.  No rashes, lesions or eruptions.  Eyes:  no jaundice, EOMI.  ENT:  normal JVP.  .  Chest/Lungs:  Clear bilaterally, no rales or wheezing.  Cardiac:  Regular rhythm, normal S1 and S2.  No murmur, rub or gallop.    Abdomen:  Normal bowel sounds.  Extremities:  No lower extremity edema is present.   Neurological:  CN 2-12 grossly intact.    REVIEW OF SYSTEMS:  Review of system completed and negative with the exception of what was described in the HPI section.     CURRENT MEDICATIONS:    aspirin  81 mg Oral Daily     atorvastatin  40 mg Oral QPM     lisinopril  5 mg Oral Daily     ticagrelor  90 mg Oral Q12H       ALLERGIES     Allergies   Allergen Reactions     Keflex [Cephalexin Hcl] Hives     Penicillins Hives     Ragweeds      Sneezing etc.       PAST MEDICAL HISTORY:  Past Medical History:   Diagnosis Date     Atypical squamous cells of undetermined significance (ASCUS) on Papanicolaou smear of cervix 10/9/2015     GERD (gastroesophageal reflux disease)      HTN (hypertension)      Hyperlipidemia      Type 2 diabetes mellitus without complications (H) 10/22/2015       PAST SURGICAL HISTORY:  Past Surgical History:   Procedure Laterality Date     APPENDECTOMY  1976    at time of USO     ARTHROSCOPY KNEE RT/LT       COLONOSCOPY N/A 11/9/2015    Procedure: COLONOSCOPY;  Surgeon: Kate Redmond MD;  [facility]     COLONOSCOPY N/A 2/14/2022    Procedure: COLONOSCOPY;  Surgeon: Kate Redmond MD;  Location: [facility] LAP OVARIAN CYSTOTOMY  1976    salpingooopherectomy for large dermoid       FAMILY HISTORY:  Family History   Problem  Relation Age of Onset     Diabetes Mother         type 2     Hypertension Mother      Dementia Mother 89     Hypertension Father      Cerebrovascular Disease Father      Cancer Father 68        brain ca     Diabetes Maternal Uncle         type 1       SOCIAL HISTORY:  Social History     Socioeconomic History     Marital status:      Spouse name: Not on file     Number of children: Not on file     Years of education: Not on file     Highest education level: Not on file   Occupational History     Not on file   Tobacco Use     Smoking status: Never Smoker     Smokeless tobacco: Never Used   Substance and Sexual Activity     Alcohol use: No     Alcohol/week: 0.0 standard drinks     Drug use: No     Sexual activity: Yes     Partners: Male   Other Topics Concern     Parent/sibling w/ CABG, MI or angioplasty before 65F 55M? Not Asked   Social History Narrative    10/2012  to HS sweet heart Children- 2 (College age)    Work- mental health con for childrenTobacco- none    ETOH- none Exercise-  1 hr x 5 /week- Walks brisk, CC sk     Social Determinants of Health     Financial Resource Strain: Not on file   Food Insecurity: Not on file   Transportation Needs: Not on file   Physical Activity: Not on file   Stress: Not on file   Social Connections: Not on file   Intimate Partner Violence: Not on file   Housing Stability: Not on file         Recent Lab Results:  Recent Labs   Lab 03/01/22  0631 02/28/22  1853 02/28/22  0839 02/28/22  0635 02/27/22  0539 02/26/22  0410 02/25/22  1857   WBC  --   --   --  5.2 6.0  --  7.2   HGB  --  11.8  --  12.7 12.2  --  12.9   MCV  --   --   --  88 89  --  89   PLT  --   --   --  260 251  --  275    138  --   --  138  --  134   POTASSIUM 3.7 3.5  --  4.2 3.9   < > 3.3*   CHLORIDE 107 108  --   --  108  --  102   CO2 25 24  --   --  28  --  27   BUN 14 15  --   --  16  --  14   CR 0.62 0.62  --   --  0.71  --  0.67   ANIONGAP 6 6  --   --  2*  --  5   CHRISTOPHER 9.6 8.9  --   --   9.5  --  9.5   * 161* 170*  --  176*  --  132*   ALBUMIN  --   --   --   --   --   --  4.0   PROTTOTAL  --   --   --   --   --   --  7.7   BILITOTAL  --   --   --   --   --   --  0.3   ALKPHOS  --   --   --   --   --   --  42   ALT  --   --   --   --   --   --  33   AST  --   --   --   --   --   --  17    < > = values in this interval not displayed.

## 2022-03-01 NOTE — PLAN OF CARE
[id number]: A&Ox4. VSS on RA. Tele SR 70s. Atropine at bedside. Denies CP or shortness of breath. R radial site CDI CMS intact. External catheter removed this morning. Up SBA/independently, calls appropriately.  NPO @ 0000.

## 2022-03-01 NOTE — DISCHARGE SUMMARY
[facility]  Discharge Summary        Rubi Nicolas MRN# [id number]   YOB: 1960 Age: 61 year old     Date of Admission: 2/25/2022  Date of Discharge: 3/1/2022  Admitting Physician: Sukhdeep Benjamin MD  Discharge Physician: Papa Cortez MD     Primary Provider: Luz Maria Demarco  Primary Care Physician Phone Number: [phone]         Discharge Diagnoses:   1. NSTEMI - s/p LAD stent 2/28/2022.  2. Syncope with sinus pauses, suspect vasovagal.        Other Chronic Medical Problems:      1. Hypertension (benign essential).  2. DM II.  3. DLD.  4. GERD.       Allergies:         Allergies   Allergen Reactions     Keflex [Cephalexin Hcl] Hives     Penicillins Hives     Ragweeds      Sneezing etc.           Discharge Medications:        Current Discharge Medication List      START taking these medications    Details   !! aspirin (ASA) 81 MG EC tablet Take 1 tablet (81 mg) by mouth daily  Qty: 90 tablet, Refills: 3    Associated Diagnoses: NSTEMI (non-ST elevated myocardial infarction) (H)      !! aspirin (ASA) 81 MG EC tablet Take 1 tablet (81 mg) by mouth daily Start tomorrow.  Qty: 30 tablet, Refills: 3    Associated Diagnoses: Elevated troponin      atorvastatin (LIPITOR) 40 MG tablet Take 1 tablet (40 mg) by mouth daily  Qty: 90 tablet, Refills: 3    Associated Diagnoses: Elevated troponin      nitroGLYcerin (NITROSTAT) 0.4 MG sublingual tablet For chest pain place 1 tablet under the tongue every 5 minutes for 3 doses. If symptoms persist 5 minutes after 1st dose call 911.  Qty: 25 tablet, Refills: 11    Associated Diagnoses: NSTEMI (non-ST elevated myocardial infarction) (H)      ticagrelor (BRILINTA) 90 MG tablet Take 1 tablet (90 mg) by mouth every 12 hours  Qty: 60 tablet, Refills: 11    Associated Diagnoses: NSTEMI (non-ST elevated myocardial infarction) (H)       !! - Potential duplicate medications found. Please discuss with provider.      CONTINUE  these medications which have CHANGED    Details   lisinopril (ZESTRIL) 10 MG tablet Take 0.5 tablets (5 mg) by mouth daily    Associated Diagnoses: Essential hypertension with goal blood pressure less than 140/90      metFORMIN (GLUCOPHAGE) 1000 MG tablet Take 1 tablet (1,000 mg) by mouth 2 times daily (with meals)  Qty: 180 tablet, Refills: 0    Associated Diagnoses: Type 2 diabetes mellitus without complication, without long-term current use of insulin (H)         CONTINUE these medications which have NOT CHANGED    Details   glimepiride (AMARYL) 2 MG tablet TAKE 3 TABLETS (6 MG) BY MOUTH EVERY MORNING (BEFORE BREAKFAST)  Qty: 270 tablet, Refills: 4    Associated Diagnoses: Type 2 diabetes mellitus without complication, without long-term current use of insulin (H)      Multiple Vitamin (MULTIVITAMIN OR) Take 1 tablet by mouth daily.      omeprazole (PRILOSEC) 20 MG DR capsule Take 20 mg by mouth twice a week Monday, Thursday      Continuous Blood Gluc  (FREESTYLE ADRIANA 14 DAY READER) ROSALIE USE 1 DEVICE CONTINUOUSLY  Qty: 1 each, Refills: 0    Associated Diagnoses: Type 2 diabetes mellitus without complication, without long-term current use of insulin (H)      Continuous Blood Gluc Sensor (FREESTYLE ADRIANA 14 DAY SENSOR) MISC USE ONE EVERY 14 DAYS  Qty: 2 each, Refills: 11    Associated Diagnoses: Type 2 diabetes mellitus without complication, without long-term current use of insulin (H)         STOP taking these medications       aspirin 81 MG tablet Comments:   Reason for Stopping:         simvastatin (ZOCOR) 40 MG tablet Comments:   Reason for Stopping:                   Discharge Instructions and Follow-Up:      Discharge Orders      CARDIAC REHAB REFERRAL      Follow-Up with Cardiology [facility]      Medication Instructions - Anticoagulants    Do NOT stop your aspirin or platelet inhibitor unless directed by your Cardiologist.  These medications help to prevent platelets in your blood from sticking together  and forming a clot.  Examples of these medications are:  Ticagrelor (Brilinta), Clopidigrel (Plavix), Prasugrel (Effient)     When to call - Contact the Heart Clinic    You may experience symptoms that require follow-up before your scheduled appointment. Contact the Heart Clinic if you develop: Fever over 100.4o Fahrenheit, that lasts more than one day; Redness, heat, or pus at the puncture site; Change in color or temperature in your hand or arm.     When to call - Reasons to Call 911    If your wrist puncture site starts bleeding after discharge, sit down and apply firm pressure with your thumb against the puncture site and fingers against the back of the wrist for 10 minutes. If the bleeding stops, continue to rest, keeping your wrist still for 2 hours. Notify your doctor as soon as possible.  IF BLEEDING DOES NOT STOP OR THERE IS A LARGER AMOUNT OF BLEEDING OR SPURTING CALL 9-1-1 immediately.DO NOT drive yourself to the hospital.     Precautions - Lifting    DO NOT lift more than 5 pounds with affected arm for 48 hours     Precautions - Household Activities    Avoid excessive bending or movement of your wrist for 72 hours.  Do not subject hand/arm to any forceful movements for 24 hours, such as supporting weight when rising from a chair or bed.     Remove the band-aid on the puncture site after 24 hours and leave open to air. If minor oozing, you may apply a band-aid and remove after 12 hours.     Precautions - Active Sports Activities    DO NOT engage in vigorous exercise using your affected arm for 3 days after discharge.  This includes golf, tennis or swimming.     Precautions - Operating yard equipment or vehicles    Do not operate a chainsaw, lawnmower, motorcycle, or all-terrain vehicle for 48 hours after the procedure.     Precautions - Elective Dental Work    NO elective dental work for 6 weeks after receiving a stent.     Comfort and Pain Management - Bruising after Surgery    Expect mild tingling of  hand and tenderness at the wrist puncture site for up to 3 days. You may take Tylenol or a pain medicine recommended by your doctor.     Activity - Cardiac Rehab    You are encouraged to enroll in an Outpatient Cardiac Rehab program after discharge from the hospital.  Our Cardiac Rehab staff may visit briefly with you while you're in the hospital.  If they miss you, someone will contact you after you are home.     Return to Driving    Driving is NOT permitted for 24 hours after surgery     Return to work    You may return to work after 72 hours if you are feeling well and your job does not involve heavy lifting.     Shower / Bathing    You may shower on the day after your procedure.  DO NOT soak of wrist with the puncture site in water for 3 days to prevent infection. DO NOT take a tub bath or wash dishes for 3 days after the procedure     Dressing Removal    Remove the band-aid on the puncture site after 24 hours and leave open to air. If minor oozing, you may apply a band-aid and remove after 12 hours     Follow-up and recommended labs and tests    Follow-up with primary care provider, Luz Maria Demarco, within 7 days for hospital follow-up.  The following labs/tests are recommended: CBC, BMP, LFT's.  Cardiac follow-up per [facility].     Activity    Your activity upon discharge: activity as tolerated     Reason for your hospital stay    1. NSTEMI - s/p LAD stent 2/28/2022.  2. Syncope with sinus pauses, suspect vasovagal.     Diet    Follow this diet upon discharge: Orders Placed This Encounter      Low Saturated Fat Na <2400 mg             Consultations This Hospital Stay:      PHARMACY IP CONSULT  PHARMACY IP CONSULT  CARDIAC REHAB IP CONSULT  CARDIOLOGY IP CONSULT  NUTRITION SERVICES ADULT IP CONSULT  CARDIAC REHAB IP CONSULT  PHARMACY IP CONSULT  PHARMACY IP CONSULT  ELECTROPHYSIOLOGY IP CONSULT  PHARMACY LIAISON FOR MEDICATION COVERAGE CONSULT  SMOKING CESSATION PROGRAM IP CONSULT  SMOKING CESSATION  "PROGRAM IP CONSULT        Admission History:      Please see the H&P by Sukhdeep Benjamin MD on 2/25/2022 for complete details. Briefly, Rubi Nicolas is a 61 year old female with history including DM2, HTN and DLD, mathieu presented 2/25/2022 with chest pain and found with trop elevation.        Problem Oriented Hospital Course:        NSTEMI - s/p LAD stent 2/28/2022.  Syncope with sinus pauses, suspect vasovagal.  Hypertension (benign essential).  [PTA: lisinopril 5 mg daily.]  * Presented 2/25 with intermittent chest pain over last day or so PTA, lasting seconds. On initial evaluation 2/25, was mildly hypertensive; ECG showed SR w/o acute ischemic changes; trop 77. CXR 2/25 negative. Started on heparin gtt and metoprolol an admit. Continued on ASA and simvastatin on admit. Lisinopril increased on admit. Cardiology consulted on admit.  * Follow-up trops \"flat\". Echo 2/26 showed LVEF 60-65%, no regional wall motion abnormalities; RV OK.  * Lisinopril decreased back to home dose 2/28. Underwent angiogram 2/28 that showed a mLAD 70-75% lesion which was stented. Noted to be in sinus america after and metoprolol stopped after angiogram. Later evening 2/28, code blue called for episode of syncope with subsequent bradycardia with concern for asystole; initially noted without pulse and was started on CPR for several seconds with subsequent pulse and 's. Pt subsequently awake and alert; she later said that had just finished dinner and felt the need to urinate, followed by lightheadedness and syncope.  * Simvastatin changed to atorvastatin 3/1. Seen by EP 3/1.  Recent Labs   Lab 02/26/22  0704 02/26/22  0410 02/25/22  1857   TROPONINIS 68* 71* 77*   - Continue ASA, ticagrelor, atorvastatin.  - Continue lisinopril 5 mg daily.  - Follow-up with Cardiology.    DM II.  [PTA: glimepiride 6 mg daily, metformin 1000 mg BID.]  * Most recent A1C 6.8% 8/2021.   - Continue carb controlled diet.  - Continue PTA " "glimepiride and metformin.    DLD.  * PTA on simvastatin.  * FLP 2/27: , HDL 62, LDL 48, .  * Simvastatin changed to atorvastatin 3/1.  - Continue atorvastatin.    Hypokalemia.  * Potassium low on admit 2/25. Treated with replacement.    GERD.  * Chronic and stable.  - Continue omeprazole.    Clinically Significant Risk Factors Present on Admission              # Overweight: Estimated body mass index is 25.15 kg/m  as calculated from the following:    Height as of 2/14/22: 1.575 m (5' 2\").    Weight as of this encounter: 62.4 kg (137 lb 8 oz).        COVID-19 testing.  COVID-19 PCR Results    COVID-19 PCR Results 2/11/22 2/26/22   SARS CoV2 PCR Negative Negative      Comments are available for some flowsheets but are not being displayed.         COVID-19 Antibody Results, Testing for Immunity    COVID-19 Antibody Results, Testing for Immunity   No data to display.                   Pending Results:        Unresulted Labs Ordered in the Past 30 Days of this Admission     No orders found from 1/26/2022 to 2/26/2022.                Discharge Disposition:      Discharged to home.        Discharge Time:      Approximately 35 minutes.          Condition and Physical on Discharge:    See progress note on the same date as this discharge summary.          Key Imaging Studies, Lab Findings and Procedures/Surgeries:        Results for orders placed or performed during the hospital encounter of 02/25/22   Chest XR,  PA & LAT    Narrative    EXAM: XR CHEST 2 VW  LOCATION: [facility]  DATE/TIME: 2/25/2022 10:40 PM    INDICATION: chest pain  COMPARISON: None.      Impression    IMPRESSION: Heart size is normal. Lungs are clear. No pneumothorax or pleural effusion.   Echocardiogram Complete     Value    LVEF  60-65%    Narrative    [id number]  [id number]  [id number]  [id number]^BAYRON^MAITE^SALLY     [facility]  Echocardiography Laboratory  [address] [place] [id number]   "   Name: MURRAY MILES  MRN: [id number]  : 1960  Study Date: 2022 07:57 AM  Age: 61 yrs  Gender: Female  Patient Location: [facility]  Reason For Study: Chest Pain, Chest Tightness, Chest Pressure  Ordering Physician: MAITE VERMA  Referring Physician: Luz Maria Demarco PA-C  Performed By: Kathrine Newberry     BSA: 1.6 m2  Height: 62 in  Weight: 140 lb  HR: 71  BP: 141/89 mmHg  ______________________________________________________________________________  Procedure  Complete Portable Echo Adult. Optison (NDC #[id number]) given intravenously.  ______________________________________________________________________________  Interpretation Summary     The visual ejection fraction is 60-65%.  The left ventricle is normal in structure, function and size.  No regional wall motion abnormalities noted.  The right ventricle is normal in structure, function and size.  There is no pericardial effusion.  ______________________________________________________________________________  Left Ventricle  The left ventricle is normal in structure, function and size. There is normal  left ventricular wall thickness. The visual ejection fraction is 60-65%. Left  ventricular diastolic function is indeterminate. No regional wall motion  abnormalities noted.     Right Ventricle  The right ventricle is normal in structure, function and size.     Atria  Normal left atrial size. Right atrial size is normal.     Mitral Valve  There is trace mitral regurgitation.     Tricuspid Valve  No tricuspid regurgitation.     Aortic Valve  No aortic regurgitation is present.     Pulmonic Valve  The pulmonic valve is not well visualized.     Vessels  The aortic root is normal size. Normal size ascending aorta. The inferior vena  cava is normal.     Pericardium  There is no pericardial effusion.     ______________________________________________________________________________  MMode/2D Measurements & Calculations  IVSd: 0.87  cm  LVIDd: 4.3 cm  LVIDs: 2.6 cm  LVPWd: 0.90 cm  FS: 40.9 %  LV mass(C)d: 122.1 grams  LV mass(C)dI: 74.3 grams/m2     Ao root diam: 3.0 cm  LA dimension: 3.6 cm  asc Aorta Diam: 3.2 cm  LA/Ao: 1.2  LA Volume (BP): 39.9 ml  LA Volume Index (BP): 24.3 ml/m2  RWT: 0.42     Doppler Measurements & Calculations  MV E max donte: 64.1 cm/sec  MV A max donte: 68.7 cm/sec  MV E/A: 0.93  MV dec slope: 322.4 cm/sec2     Ao V2 max: 143.5 cm/sec  Ao max P.0 mmHg  Ao V2 mean: 99.9 cm/sec  Ao mean P.5 mmHg  Ao V2 VTI: 32.7 cm  LV V1 max P.2 mmHg  LV V1 max: 114.4 cm/sec  LV V1 VTI: 24.8 cm  PI end-d donte: 110.8 cm/sec  AV Donte Ratio (DI): 0.80  E/E' av.1  Lateral E/e': 7.5  Medial E/e': 8.8     ______________________________________________________________________________  Report approved by: CANDICE Camacho 2022 10:33 AM

## 2022-03-01 NOTE — PLAN OF CARE
S/p coronary angio with 1 stent placed 2/28. A&Ox4. AVSS on RA-- Bradycardia. Tele SB/SR. C/o nausea on low fat, low salt diet, zofran given, compazine ordered. Up independent prior to procedure. Denies pain. TR band on R radial site, CMS intact.   Code BLUE called after Asystole warning by telemetry at 1833, see NP note. Pt transferred to CCU. Cardiology notified, PRN Atropine ordered.

## 2022-03-01 NOTE — CODE/RAPID RESPONSE
[facility]    CODE BLUE/RRT Note  2/28/2022   Time Called: 1838      Assessment & Plan     Multiple episodes of sinus pause possibly secondary to vaso-vagal response vs medication response from BB  CODE BLUE called for bradycardia to asystole without pulse noted initially.  Nursing staff report the patient has been sinus bradycardia to sinus rhythm rates 50s to 60s on monitor since PCI earlier today.  She suddenly became bradycardic with rate in the 30s followed by a pause prompting nursing to present bedside.  On initial assessment no pulse was felt thus CODE BLUE called and CPR started.  Once on monitor was noted that she was in sinus rhythm and pulse was palpable.  Blood pressure 140s over 70s 80s.  Patient awake and conversant.  Upon my arrival bedside, the patient is alert and interactive and lying flat in bed.  She reports sitting upright for the first time following the procedure to eat her dinner.  She states that she had been feeling good until she had finished eating her dinner.  She states she felt the need to urinate and then suddenly became lightheaded and hot and felt like she was going to pass out.  She is complaining of nausea at the time of my arrival.  Bladder scan for 387 mL urine.  Telemetry strips reviewed.  The patient had approximately 3 seconds sinus pause followed by a couple beats subsequent sinus pause x2 for approximately 3 to 6 seconds and CPR briefly initiated.  Patient is now in sinus bradycardia/sinus rhythm rates 57-60.  EKG without any indications of acute ischemia.  Plan to treat patient's nausea and try to have patient void on bedpan.  I suspect the patient had a vasovagal episode.  She had been on beta-blockers until this morning, they were discontinued by Dr. Powell following PCI.    INTERVENTIONS:  -EKG STAT  -Bladder scan now  -Zofran now  -Hgb, BMP, VBG now  -Nursing to notify cardiology of event    At the end of the RRT will remain on current unit  with telemetry monitoring.    Discussed with and defer further cares to bedside nursing and flying squad.    Interval History     Rubi Nicolas is a 61 year old female who was admitted on 2/25/2022 for chest pain, NSTEMI, post PCI.    Medical history significant for:   Past Medical History:   Diagnosis Date     Atypical squamous cells of undetermined significance (ASCUS) on Papanicolaou smear of cervix 10/9/2015     GERD (gastroesophageal reflux disease)      HTN (hypertension)      Hyperlipidemia      Type 2 diabetes mellitus without complications (H) 10/22/2015     Past Surgical History:   Procedure Laterality Date     APPENDECTOMY  1976    at time of USO     ARTHROSCOPY KNEE RT/LT       COLONOSCOPY N/A 11/9/2015    Procedure: COLONOSCOPY;  Surgeon: Kate Redmond MD;  Location:  GI     COLONOSCOPY N/A 2/14/2022    Procedure: COLONOSCOPY;  Surgeon: Kate Redmond MD;  Location: [facility]     Z LAP OVARIAN CYSTOTOMY  1976    salpingooopherectomy for large dermoid       Code Status: Prior    Allergies   Allergies   Allergen Reactions     Keflex [Cephalexin Hcl] Hives     Penicillins Hives     Ragweeds      Sneezing etc.       Physical Exam   Vital Signs with Ranges:  Temp:  [97.8  F (36.6  C)-98.3  F (36.8  C)] 97.8  F (36.6  C)  Pulse:  [52-67] 57  Resp:  [16-20] 18  BP: ()/() 153/86  SpO2:  [97 %-100 %] 98 %  I/O last 3 completed shifts:  In: 999 [P.O.:720; I.V.:279]  Out: -     Constitutional: alert, no acute distress  ENT: mucus membranes moist   Neck: supple  Pulmonary: clear  Cardiovascular: S1, S2, no murmur  GI: soft, nontender  Skin/Integumen: Right radial TR band in place, no obvious bruising, lesions on exposed skin  Neuro: alert and oriented, no focal deficits  Psych:  calm  Extremities: no peripheral edema    Data     EKG:  Interpreted by DULCE MARIA Frank CNP  Time reviewed: 1850  Symptoms at time of EKG: post sinus pauses   Rhythm: normal sinus   Rate:  Normal  Axis: Right Axis Deviation  Ectopy: none  Conduction: normal  ST Segments/ T Waves: No acute ischemic changes  Q Waves: none  Comparison to prior: Unchanged    Clinical Impression: no acute changes      ABG:  -No lab results found in last 7 days.    Troponin:    No lab results found.    IMAGING: (X-ray/CT/MRI)   No results found for this or any previous visit (from the past 24 hour(s)).    CBC with Diff:  Recent Labs   Lab Test 02/28/22  0635   WBC 5.2   HGB 12.7   MCV 88           Lactic Acid:    No results found for: LACT        Comprehensive Metabolic Panel:  Recent Labs   Lab 02/28/22  0839 02/28/22  0635 02/27/22  0539 02/26/22  0410 02/25/22  1857   NA  --   --  138  --  134   POTASSIUM  --  4.2 3.9   < > 3.3*   CHLORIDE  --   --  108  --  102   CO2  --   --  28  --  27   ANIONGAP  --   --  2*  --  5   *  --  176*  --  132*   BUN  --   --  16  --  14   CR  --   --  0.71  --  0.67   GFRESTIMATED  --   --  >90  --  >90   CHRISTOPHER  --   --  9.5  --  9.5   PROTTOTAL  --   --   --   --  7.7   ALBUMIN  --   --   --   --  4.0   BILITOTAL  --   --   --   --  0.3   ALKPHOS  --   --   --   --  42   AST  --   --   --   --  17   ALT  --   --   --   --  33    < > = values in this interval not displayed.       INR:    No lab results found.    D-DIMER:  No results found for: DIMER    BNP:  No results found for: BNP    UA:  No results for input(s): COLOR, APPEARANCE, URINEGLC, URINEBILI, URINEKETONE, SG, UBLD, URINEPH, PROTEIN, UROBILINOGEN, NITRITE, LEUKEST, RBCU, WBCU in the last 168 hours.      Time Spent on this Encounter   I spent 30 minutes of critical care time on the unit/floor managing the care of Rubi Nicolas. Upon evaluation, this patient had a high probability of imminent or life-threatening deterioration due to sinus pauses post PCI, which required my direct attention, intervention, and personal management. 100% of my time was spent at the bedside counseling the patient and/or  coordinating care regarding services listed in this note.    DULCE MARIA Frank CNP

## 2022-03-01 NOTE — PROVIDER NOTIFICATION
MD Notification    Notified Person: MD    Notified Person Name: Dr. Caal    Notification Date/Time: 2/28/2022 7:28 PM     Notification Interaction: web-page/ telephone    Purpose of Notification: Pt c/o Nausea after 1 dose zofran    Orders Received: compazine ordered    Comments:

## 2022-03-01 NOTE — PROVIDER NOTIFICATION
MD Notification    Notified Person: MD    Notified Person Name: Dr. Gregory    Notification Date/Time: 2/28 1942    Notification Interaction: phone call     Purpose of Notification: Pt america down into the 30's SB. Once RN's bedside, no pulse was felt and compression started for about 5-7 seconds. Pt came through a post event rhythm was SB/SR 55-60's.    Orders Received: 0.5mg atropine PRN for symptomatic bradycardia, have at bedside. NPO at 0000.    Comments:

## 2022-03-01 NOTE — PLAN OF CARE
Discharge to home. Will follow up with PCP and cardiology as scheduled. Discharge education/medications complete. Transport arranged with .  Goal Outcome Evaluation: Discharging home today.

## 2022-03-01 NOTE — CONSULTS
Discharge Pharmacy Test Claim    Brilinta is covered through patient's commercial [employer] insurance with a copay of $30/mo.      Mary Caldwell  [facility] Pharmacy Liaison  Ph: [phone] Pager: [phone]

## 2022-03-02 ENCOUNTER — NURSE TRIAGE (OUTPATIENT)
Dept: FAMILY MEDICINE | Facility: CLINIC | Age: 62
End: 2022-03-02
Payer: COMMERCIAL

## 2022-03-02 ENCOUNTER — TELEPHONE (OUTPATIENT)
Dept: CARDIOLOGY | Facility: CLINIC | Age: 62
End: 2022-03-02
Payer: COMMERCIAL

## 2022-03-02 DIAGNOSIS — I25.10 CAD (CORONARY ARTERY DISEASE): Primary | ICD-10-CM

## 2022-03-02 RX ORDER — ATORVASTATIN CALCIUM 40 MG/1
40 TABLET, FILM COATED ORAL DAILY
Qty: 30 TABLET | Refills: 1 | Status: SHIPPED | OUTPATIENT
Start: 2022-03-02 | End: 2022-03-11

## 2022-03-02 NOTE — TELEPHONE ENCOUNTER
What type of discharge? Inpatient  Risk of Hospital admission or ED visit: 15%  Is a TCM episode required? Yes  When should the patient follow up with PCP? 14 days of discharge.    Swapnil Ceja RN  [facility]

## 2022-03-02 NOTE — TELEPHONE ENCOUNTER
Patient was evaluated by cardiology while inpatient for unstable angina, elevated troponin-NSTEMI. PMH: DM2, GERD, HLD, syncope, HTN. Echo LVEF 60-65% without wall motion or valvular abnormalities. 2/28/22: Coronary angiogram via RRA resulted in GLEN to LAD. Later that evening, CODE BLUE called due to sinus pause and brief episode without a palpable pulse. Brief episode of CPR and return to SR. Dr. Polanco consulted and felt to be vasovagal syncope. Hold BB for now. Pt was started on ASA, Lipitor, NTG, Brilinta. Called patient to discuss any post hospital d/c questions, review medication changes, and confirm f/u appts. RN confirmed with patient that she was d/c with an adequate supply of the antiplatelet Brilinta, and reminded of importance of taking without interruption. Pt has an Rx for PRN SL Nitroglycerin. Patient denied any questions regarding new medications or changes to PTA medications. States she did not receive an RX for Lipitor. Will escribe RX to [facility]'s [facility] off of [facility] per request. Patient denied any SOB, chest pain, fever or light headedness. RRA cardiac cath site is without bleeding, swelling, redness or signs of infection. RN confirmed with patient that she has an OV scheduled on 3/11/22 at 1230 with REGINA Beata Larkin at our [place] Office. Cardiac rehab is scheduled on 3/14/22 at 0800. Patient advised to call clinic with any cardiac related questions or concerns prior to this regina't. Patient verbalized understanding and agreed with plan. TUCKER Yang RN.

## 2022-03-02 NOTE — TELEPHONE ENCOUNTER
Writer reviewed pt's chart and escribed and RX for Lipitor 40 mg to pt's [facility] on [address]. VM left updating pt that RX was escribed. Instructed to call back with any further questions. TUCKER Yang RN.

## 2022-03-03 ENCOUNTER — TELEPHONE (OUTPATIENT)
Dept: CARDIOLOGY | Facility: CLINIC | Age: 62
End: 2022-03-03
Payer: COMMERCIAL

## 2022-03-03 DIAGNOSIS — I25.10 CAD (CORONARY ARTERY DISEASE): Primary | ICD-10-CM

## 2022-03-03 LAB
ATRIAL RATE - MUSE: 56 BPM
ATRIAL RATE - MUSE: 57 BPM
ATRIAL RATE - MUSE: 64 BPM
ATRIAL RATE - MUSE: 66 BPM
DIASTOLIC BLOOD PRESSURE - MUSE: NORMAL MMHG
INTERPRETATION ECG - MUSE: NORMAL
P AXIS - MUSE: 13 DEGREES
P AXIS - MUSE: 22 DEGREES
P AXIS - MUSE: 24 DEGREES
P AXIS - MUSE: 32 DEGREES
PR INTERVAL - MUSE: 158 MS
PR INTERVAL - MUSE: 164 MS
QRS DURATION - MUSE: 86 MS
QRS DURATION - MUSE: 88 MS
QRS DURATION - MUSE: 92 MS
QRS DURATION - MUSE: 94 MS
QT - MUSE: 402 MS
QT - MUSE: 410 MS
QT - MUSE: 412 MS
QT - MUSE: 414 MS
QTC - MUSE: 387 MS
QTC - MUSE: 399 MS
QTC - MUSE: 425 MS
QTC - MUSE: 434 MS
R AXIS - MUSE: 23 DEGREES
R AXIS - MUSE: 33 DEGREES
R AXIS - MUSE: 44 DEGREES
R AXIS - MUSE: 49 DEGREES
SYSTOLIC BLOOD PRESSURE - MUSE: NORMAL MMHG
T AXIS - MUSE: 30 DEGREES
T AXIS - MUSE: 33 DEGREES
T AXIS - MUSE: 44 DEGREES
T AXIS - MUSE: 64 DEGREES
VENTRICULAR RATE- MUSE: 56 BPM
VENTRICULAR RATE- MUSE: 57 BPM
VENTRICULAR RATE- MUSE: 64 BPM
VENTRICULAR RATE- MUSE: 66 BPM

## 2022-03-03 RX ORDER — CLOPIDOGREL BISULFATE 75 MG/1
TABLET ORAL
Qty: 94 TABLET | Refills: 3 | Status: SHIPPED | OUTPATIENT
Start: 2022-03-03 | End: 2022-11-09

## 2022-03-03 NOTE — TELEPHONE ENCOUNTER
Patient called, recently in ER, dischraged this week.     Prescribed brilliant,  Every 12 hours.   CC: Dizziness having shortness of breath.   Feeling unsteady getting up but able to stand  seveirty:shortness of breath with activity.   Denies chest pain   Denies wheezing  Onset: yesterday- worsening today  Pattern: constant  Mild- not at rest, with walking  Feel dizzy and light headed constantly  Recurrent: no  Cardiac hx: yes just had   Lung  Hx: no  Cause: only change since bilineata  Stent placed on Monday    Triaged per Epic Triage Protocol, gave care advice, advised patitent ot be seen today. Patient expressed verbal understanding, will also follow up with cardiology team for advice and if unable to be seen with them thye will  be seen in urgent care today.     Swapnil Ceja RN  [facility]    Reason for Disposition    MILD difficulty breathing (e.g., minimal/no SOB at rest, SOB with walking, pulse < 100) of new onset or worse than normal    Additional Information    Negative: Breathing stopped and hasn't returned    Negative: Choking on something    Negative: SEVERE difficulty breathing (e.g., struggling for each breath, speaks in single words, pulse > 120)    Negative: Bluish (or gray) lips or face    Negative: Difficult to awaken or acting confused (e.g., disoriented, slurred speech)    Negative: Passed out (i.e., fainted, collapsed and was not responding)    Negative: Wheezing started suddenly after medicine, an allergic food, or bee sting    Negative: Stridor    Negative: Slow, shallow and weak breathing    Negative: Sounds like a life-threatening emergency to the triager    Negative: Chest pain    Negative: Wheezing (high pitched whistling sound) and previous asthma attacks or use of asthma medicines    Negative: Difficulty breathing and only present when coughing    Negative: Difficulty breathing and only from stuffy or runny nose    Negative: Difficulty breathing and within 14 days  "of COVID-19 Exposure    Negative: MODERATE difficulty breathing (e.g., speaks in phrases, SOB even at rest, pulse 100-120) of new onset or worse than normal    Negative: Wheezing can be heard across the room    Negative: Drooling or spitting out saliva (because can't swallow)    Negative: Any history of prior \"blood clot\" in leg or lungs    Negative: Illness requiring prolonged bedrest in past month (e.g., immobilization, long hospital stay)    Negative: Hip or leg fracture (broken bone) in past month (or had cast on leg or ankle in past month)    Negative: Major surgery in the past month    Negative: Long-distance travel in past month (e.g., car, bus, train, plane; with trip lasting 6 or more hours)    Negative: Extra heart beats OR irregular heart beating   (i.e., \"palpitations\")    Negative: Fever > 103 F (39.4 C)    Negative: Fever > 101 F (38.3 C) and over 60 years of age    Negative: Fever > 100.0 F (37.8 C) and bedridden (e.g., nursing home patient, stroke, chronic illness, recovering from surgery)    Negative: Fever > 100.0 F (37.8 C) and diabetes mellitus or weak immune system (e.g., HIV positive, cancer chemo, splenectomy, organ transplant, chronic steroids)    Negative: Periods where breathing stops and then resumes normally and bedridden (e.g., nursing home patient, CVA)    Negative: Pregnant or postpartum (from 0 to 6 weeks after delivery)    Negative: Patient sounds very sick or weak to the triager    Protocols used: BREATHING DIFFICULTY-A-OH      "

## 2022-03-03 NOTE — TELEPHONE ENCOUNTER
Contacted patient to review Beata's recommendations. Patient verbalized understanding and agreed with plan of care. Rx sent to patient's pharmacy.

## 2022-03-03 NOTE — TELEPHONE ENCOUNTER
Her symptoms very well maybe from the Brilinta. Please change her Plavix with 300 mg loading dose and then 75 mg daily thereafter.    DULCE MARIA Rothman, CNP

## 2022-03-03 NOTE — TELEPHONE ENCOUNTER
Contacted patient to review further. Patient states that she has been lightheaded quite a bit since starting the Brilinta. Patient states that she is mostly lightheaded when she is up and moving around. Patient is unable to check BP at home, cuff is not working. Patient states that she also feels like she is short of breath. Patient states that all of her medication was the same except the Brilinta is new, so she is curious if that is what is causing her symptoms.     Consult note 3/1/22:    Assessment & Plan     Rubi Nicolas is a 61 year old female with past medical history significant for HTN, DM, HLP, GERD admitted on 2/25/2022 with NSTEMI.     Interval History:  CODE BLUE due to sinus pause and brief episode without a palpable pulse. Brief episode of CPR and return to SR.      COVID status: Negative      Assessment:   1. NSTEMI    Troponin peak at 77    Echo LVEF 60-65% without wall motion or valvular abnormalities    S/p LAD stent 2/28     2. Sinus pause and bradycardia and patient became pulseless     Required brief CPR and return to SR palpable with /70 requiring CODE BLUE    Metoprolol 12.5 mg BID discontinued     3. Hypertension    [PTA: lisinopril 5 mg daily]    Controlled and soft on lisinopril 10 mg daily and metoprolol 12.5 mg BID     4. Hyperlipidemia    [PTA: Simvastatin ]    FLP 2/27: , HDL 62, LDL 48, .     5. T2DM     6. GERD    PTA PPI     Plan:  1. EP consult for sinus pause and bradycardia   2. Restart PTA lisinopril  3. Pharmacy liaison consult for Brilinta cost. Can discharge Brilinta with 30 day free from pharmacy and can change as an outpatient if needed  4. Please discharge on lisinopril, atorvastatin, DAPT (ASA and Brilinta) and SL nitroglycerin     [facility] follow up  Follow up with Beata Larkin on 3/11 at 1230     DULCE MARIA JACOBSON CNP  Pager:  [phone]   (8 am - 5pm, M-F)    Will route to Beata for review.

## 2022-03-03 NOTE — TELEPHONE ENCOUNTER
[facility] Call Center    Phone Message    May a detailed message be left on voicemail: yes     Reason for Call: Medication Question or concern regarding medication   Prescription Clarification  Name of Medication: Brilinta  Prescribing Provider: eileen   Pharmacy: eileen  Rubi called stating that she had a heart attack over the weekend and was started on Brilinta. Rubi stated that she is feeling lightheaded and is wondering if it is because of the medication and she is wondering what she should do. Please advise. Thank you.           Action Taken: Message routed to:  Other: Giana    Travel Screening: Not Applicable

## 2022-03-09 NOTE — PROGRESS NOTES
Maximiliano Venegas is a 61 year old who presents for the following health issues: hosp f/u        HPI     She went to ED with chest squeezing that was subtle during cross country skiing  She is s/p PTCA to LAD  Had syncope likely due to BB which was stopped during her hospitalization  She was discharged on brilinta which caused dizziness and sob so cards switched her to plavix.  She is checking her blood sugars and getting 130-145  She is on a Mediterranian diet  She has f/u with cards tomorrow  Starts cardiac rehab on Monday  She walked 3-6 miles since being home from the hospital  She is planning in retiring in June and will have more time to exercise then.      Lab Results   Component Value Date    A1C 6.8 08/27/2021    A1C 6.7 12/01/2020    A1C 6.4 04/28/2020    A1C 7.7 01/24/2020    A1C 6.5 05/31/2019    A1C 6.9 11/09/2018       Recent Labs   Lab Test 02/28/22  0635 02/27/22  0539 06/29/16  1012 06/01/15  0752 11/21/14  0918   CHOL 165 161   < > 169 152   HDL 58 62   < > 55 52   LDL 75 68   < > 88 73   TRIG 158* 154*   < > 129 134   CHOLHDLRATIO  --   --   --  3.1 2.9    < > = values in this interval not displayed.         Hospital Follow-up Visit:    Hospital/Nursing Home/IP Rehab Facility: [facility]  Date of Admission: 2/25/22  Date of Discharge: 3/1/22  Reason(s) for Admission:      Discharge Diagnoses:   1. NSTEMI - s/p LAD stent 2/28/2022.  2. Syncope with sinus pauses, suspect vasovagal.         Was your hospitalization related to COVID-19? No   Problems taking medications regularly:  None  Medication changes since discharge: None  Problems adhering to non-medication therapy:  None    Summary of hospitalization:  [facility] discharge summary reviewed  Diagnostic Tests/Treatments reviewed.  Follow up needed: cardiology  Other Healthcare Providers Involved in Patient s Care:         None  Update since discharge: improved.       Post Discharge Medication  Reconciliation: discharge medications reconciled and changed, per note/orders.  Plan of care communicated with patient                Past Medical History:   Diagnosis Date     Atypical squamous cells of undetermined significance (ASCUS) on Papanicolaou smear of cervix 10/9/2015     GERD (gastroesophageal reflux disease)      HTN (hypertension)      Hyperlipidemia      Type 2 diabetes mellitus without complications (H) 10/22/2015     Past Surgical History:   Procedure Laterality Date     APPENDECTOMY  1976    at time of USO     ARTHROSCOPY KNEE RT/LT       COLONOSCOPY N/A 11/9/2015    Procedure: COLONOSCOPY;  Surgeon: Kate Redmond MD;  Location:  GI     COLONOSCOPY N/A 2/14/2022    Procedure: COLONOSCOPY;  Surgeon: Kate Redmond MD;  Location:  GI     CV HEART CATHETERIZATION WITH POSSIBLE INTERVENTION N/A 2/28/2022    Procedure: Heart Catheterization with Possible Intervention;  Surgeon: Jh Powell MD;  Location:  HEART CARDIAC CATH LAB     [facility] LAP OVARIAN CYSTOTOMY  1976    salpingooopherectomy for large dermoid     Social History     Tobacco Use     Smoking status: Never Smoker     Smokeless tobacco: Never Used   Substance Use Topics     Alcohol use: No     Alcohol/week: 0.0 standard drinks     Current Outpatient Medications   Medication Sig Dispense Refill     aspirin (ASA) 81 MG EC tablet Take 1 tablet (81 mg) by mouth daily 90 tablet 3     atorvastatin (LIPITOR) 40 MG tablet Take 1 tablet (40 mg) by mouth daily 30 tablet 1     clopidogrel (PLAVIX) 75 MG tablet Take 300 mg (4 tablets) as loading dose, then 75 mg daily thereafter. 94 tablet 3     Continuous Blood Gluc  (FREESTYLE ADRIANA 14 DAY READER) ROSALIE USE 1 DEVICE CONTINUOUSLY 1 each 0     Continuous Blood Gluc Sensor (FREESTYLE ADRIANA 14 DAY SENSOR) [facility] USE ONE EVERY 14 DAYS 2 each 11     glimepiride (AMARYL) 2 MG tablet TAKE 3 TABLETS (6 MG) BY MOUTH EVERY MORNING (BEFORE BREAKFAST) 270 tablet 4     lisinopril  (ZESTRIL) 10 MG tablet Take 0.5 tablets (5 mg) by mouth daily       metFORMIN (GLUCOPHAGE) 1000 MG tablet Take 1 tablet (1,000 mg) by mouth 2 times daily (with meals) 180 tablet 0     Multiple Vitamin (MULTIVITAMIN OR) Take 1 tablet by mouth daily.       nitroGLYcerin (NITROSTAT) 0.4 MG sublingual tablet For chest pain place 1 tablet under the tongue every 5 minutes for 3 doses. If symptoms persist 5 minutes after 1st dose call 911. 25 tablet 11     omeprazole (PRILOSEC) 20 MG DR capsule Take 20 mg by mouth twice a week Monday, Thursday       Allergies   Allergen Reactions     Keflex [Cephalexin Hcl] Hives     Penicillins Hives     Ragweeds      Sneezing etc.     FAMILY HISTORY NOTED AND REVIEWED    REVIEW OF SYSTEMS: feels great    PHYSICAL EXAM:    /81   Pulse 72   Temp 96.9  F (36.1  C) (Temporal)   Resp 16   Wt 63.5 kg (140 lb 1.6 oz)   SpO2 99%   Breastfeeding No   BMI 25.62 kg/m      Patient appears non toxic  Lungs: CTA bilat  Heart: RRR without m/r/g.  Extr: no edema.    Results for orders placed or performed in visit on 03/10/22   Hemoglobin A1c     Status: Abnormal   Result Value Ref Range    Hemoglobin A1C 6.8 (H) 0.0 - 5.6 %     Assessment and Plan:     (I21.4) NSTEMI (non-ST elevated myocardial infarction) (H)  (primary encounter diagnosis)  Comment: pt was switched to a high potency statin and now on plavix in addition to her asa.    Plan: she has appt with cards tomorrow and will start cardiac rehab next week.     (E11.9) Diabetes mellitus type 2 without retinopathy (H)  Comment: she would like help with her diet. Recd she see the diabetic educator.  Plan: Hemoglobin A1c, AMB Adult Diabetes Educator. A1c still at goal at 6.8%.        Referral, Albumin Random Urine Quantitative         with Creat Ratio              Luz Maria Demarco PA-C

## 2022-03-10 ENCOUNTER — OFFICE VISIT (OUTPATIENT)
Dept: FAMILY MEDICINE | Facility: CLINIC | Age: 62
End: 2022-03-10
Payer: COMMERCIAL

## 2022-03-10 VITALS
TEMPERATURE: 96.9 F | BODY MASS INDEX: 25.62 KG/M2 | WEIGHT: 140.1 LBS | HEART RATE: 72 BPM | SYSTOLIC BLOOD PRESSURE: 130 MMHG | RESPIRATION RATE: 16 BRPM | OXYGEN SATURATION: 99 % | DIASTOLIC BLOOD PRESSURE: 81 MMHG

## 2022-03-10 DIAGNOSIS — E11.9 DIABETES MELLITUS TYPE 2 WITHOUT RETINOPATHY (H): ICD-10-CM

## 2022-03-10 DIAGNOSIS — I21.4 NSTEMI (NON-ST ELEVATED MYOCARDIAL INFARCTION) (H): Primary | ICD-10-CM

## 2022-03-10 LAB
CREAT UR-MCNC: 34 MG/DL
HBA1C MFR BLD: 6.8 % (ref 0–5.6)
MICROALBUMIN UR-MCNC: <5 MG/L
MICROALBUMIN/CREAT UR: NORMAL MG/G{CREAT}

## 2022-03-10 PROCEDURE — 99495 TRANSJ CARE MGMT MOD F2F 14D: CPT | Performed by: PHYSICIAN ASSISTANT

## 2022-03-10 PROCEDURE — 82043 UR ALBUMIN QUANTITATIVE: CPT | Performed by: PHYSICIAN ASSISTANT

## 2022-03-10 PROCEDURE — 36415 COLL VENOUS BLD VENIPUNCTURE: CPT | Performed by: PHYSICIAN ASSISTANT

## 2022-03-10 PROCEDURE — 83036 HEMOGLOBIN GLYCOSYLATED A1C: CPT | Performed by: PHYSICIAN ASSISTANT

## 2022-03-10 ASSESSMENT — PAIN SCALES - GENERAL: PAINLEVEL: NO PAIN (0)

## 2022-03-11 ENCOUNTER — OFFICE VISIT (OUTPATIENT)
Dept: CARDIOLOGY | Facility: CLINIC | Age: 62
End: 2022-03-11
Payer: COMMERCIAL

## 2022-03-11 VITALS
BODY MASS INDEX: 24.72 KG/M2 | SYSTOLIC BLOOD PRESSURE: 121 MMHG | OXYGEN SATURATION: 98 % | HEIGHT: 63 IN | DIASTOLIC BLOOD PRESSURE: 79 MMHG | WEIGHT: 139.5 LBS | HEART RATE: 76 BPM

## 2022-03-11 DIAGNOSIS — I10 PRIMARY HYPERTENSION: ICD-10-CM

## 2022-03-11 DIAGNOSIS — I21.4 NSTEMI (NON-ST ELEVATED MYOCARDIAL INFARCTION) (H): ICD-10-CM

## 2022-03-11 DIAGNOSIS — I10 ESSENTIAL HYPERTENSION WITH GOAL BLOOD PRESSURE LESS THAN 140/90: ICD-10-CM

## 2022-03-11 DIAGNOSIS — E78.5 HYPERLIPIDEMIA LDL GOAL <70: ICD-10-CM

## 2022-03-11 PROBLEM — I25.10 CAD (CORONARY ARTERY DISEASE): Status: ACTIVE | Noted: 2022-03-11

## 2022-03-11 PROCEDURE — 99214 OFFICE O/P EST MOD 30 MIN: CPT | Performed by: NURSE PRACTITIONER

## 2022-03-11 RX ORDER — ATORVASTATIN CALCIUM 40 MG/1
40 TABLET, FILM COATED ORAL DAILY
Qty: 30 TABLET | Refills: 1 | Status: SHIPPED | OUTPATIENT
Start: 2022-03-11 | End: 2022-03-25

## 2022-03-11 RX ORDER — CARVEDILOL 3.12 MG/1
3.12 TABLET ORAL 2 TIMES DAILY WITH MEALS
Qty: 180 TABLET | Refills: 3 | Status: SHIPPED | OUTPATIENT
Start: 2022-03-11 | End: 2022-11-09

## 2022-03-11 RX ORDER — LISINOPRIL 5 MG/1
5 TABLET ORAL DAILY
Qty: 90 TABLET | Refills: 3 | Status: SHIPPED | OUTPATIENT
Start: 2022-03-11 | End: 2022-11-09

## 2022-03-11 NOTE — LETTER
3/11/2022    Luz Maria Demarco PA-C  [address]  [facility] [address]    RE: Rubi Nicolas       Dear Colleague,     I had the pleasure of seeing Rubi Nicolas in the [facility].    Cardiology Clinic Progress Note  Rubi Nicolas MRN# [id number]   YOB: 1960 Age: 61 year old     Primary cardiologist: Dr. Carmona    Reason for visit: Discharge follow up     History of presenting illness:    Rubi Nicolas, a pleasant 61 year old patient who has a past medical history significant for hypertension, diabetes, hyperlipidemia and GERD with recent non-STEMI.     She was admitted to [facility] 2/25/2022 with a 1 day history of chest pressure.  Her troponin peaked at 77 and underwent a coronary angiogram on 2/28/2022 with and underwent a drug-eluting stent to her mid LAD.  The night of the procedure a RRT was called due to a sinus pause of 5 to 6 seconds and a brief episode without palpable pulse requiring brief CPR when the patient regained her pulse to return to sinus rhythm.    Electrophysiology kindly saw the patient in consult due episode of asystole.  They felt it was neurally mediated (vasovagal) syncope after her cardiac catheterization.  No further EP evaluation was warranted and it was felt that beta-blockers were not contraindicated long-term.    She was discharged home on DAPT with aspirin and Brilinta, but called her clinic states she could not tolerate Brilinta due to dizziness and shortness of breath.  Subsequently, she was changed to Plavix with resolution of her symptoms.    Today she states she is doing overall well.  She denies any recurrent symptoms and has gone hiking since discharge.  She will be starting cardiac rehab next week and would like to pursue weeklong hiking trips in the future when more comfortable from a functional status post her MI.         Assessment and Plan:     ASSESSMENT:    1. NSTEMI    Troponin  peak at 77    Echocardiogram showed LVEF of 6065% without regional wall or valvular abnormalities    Status post drug-eluting stent to the mid LAD    DAPT with aspirin and Plavix.  Did not tolerate Brilinta due to dizziness and shortness of breath    2. Vasovagal syncope    5 to 6-second pauses with brief loss of consciousness and pulselessness that spontaneously returned    Evaluated by EP in note contraindication for beta-blockade    3. Hypertension    Controlled    Currently on lisinopril 5 mg daily    Beta-blocker was discontinued due to #2    4. Dyslipidemia    Prior to admission simvastatin was changed to rosuvastatin 20 mg daily    LDL 75 from 2/28/2022    5. T2DM    Patient is actively working on diet modifications and with her PCP for improved glucose control    PLAN:     1. Start cardiac rehab as previously scheduled  2. Start carvedilol 3.125 mg twice daily  3. Continue all other present medical therapy  4. Follow-up with Dr. Carmona in 3 months or sooner if needed       Orders this Visit:  Orders Placed This Encounter   Procedures     Follow-Up with Cardiology     Orders Placed This Encounter   Medications     Coenzyme Q10 (CO Q 10 PO)     carvedilol (COREG) 3.125 MG tablet     Sig: Take 1 tablet (3.125 mg) by mouth 2 times daily (with meals)     Dispense:  180 tablet     Refill:  3     atorvastatin (LIPITOR) 40 MG tablet     Sig: Take 1 tablet (40 mg) by mouth daily     Dispense:  30 tablet     Refill:  1     lisinopril (ZESTRIL) 5 MG tablet     Sig: Take 1 tablet (5 mg) by mouth daily     Dispense:  90 tablet     Refill:  3     Medications Discontinued During This Encounter   Medication Reason     lisinopril (ZESTRIL) 10 MG tablet      atorvastatin (LIPITOR) 40 MG tablet Reorder       Today's clinic visit entailed:  Review of the result(s) of each unique test - Echo, cath, troponin, FLP, BMP  Prescription drug management  25 minutes spent on the date of the encounter doing chart review, history and exam,  "documentation and further activities per the note  Provider  Link to MDM Help Grid     The level of medical decision making during this visit was of moderate complexity.           Review of Systems:     Review of Systems:  Skin:  Negative     Eyes:  Positive for glasses  ENT:  Negative    Respiratory:  Negative shortness of breath;dyspnea on exertion  Cardiovascular:  chest pain;Negative;palpitations;edema;lightheadedness;dizziness    Gastroenterology: Negative    Genitourinary:  Negative    Musculoskeletal:  Negative    Neurologic:  Negative headaches  Psychiatric:  Negative    Heme/Lymph/Imm:  Positive for allergies  Endocrine:  Positive for diabetes            Physical Exam:   Vitals: /79 (BP Location: Right arm, Patient Position: Sitting)   Pulse 76   Ht 1.588 m (5' 2.5\")   Wt 63.3 kg (139 lb 8 oz)   SpO2 98%   BMI 25.11 kg/m    Constitutional:  cooperative;well developed        Head:  normocephalic        Eyes:  conjunctivae and lids unremarkable        ENT:  no pallor or cyanosis        Neck:  not assessed this visit        Chest:  clear to auscultation        Cardiac: regular rhythm                  Vascular: not assessed this visit                                 Right radial artery site clean dry and intact    Extremities and Back:  no edema               Medications:     Current Outpatient Medications   Medication Sig Dispense Refill     aspirin (ASA) 81 MG EC tablet Take 1 tablet (81 mg) by mouth daily 90 tablet 3     atorvastatin (LIPITOR) 40 MG tablet Take 1 tablet (40 mg) by mouth daily 30 tablet 1     carvedilol (COREG) 3.125 MG tablet Take 1 tablet (3.125 mg) by mouth 2 times daily (with meals) 180 tablet 3     clopidogrel (PLAVIX) 75 MG tablet Take 300 mg (4 tablets) as loading dose, then 75 mg daily thereafter. 94 tablet 3     Coenzyme Q10 (CO Q 10 PO)        Continuous Blood Gluc  (FREESTYLE ADRIANA 14 DAY READER) ROSALIE USE 1 DEVICE CONTINUOUSLY 1 each 0     Continuous Blood Gluc " Sensor (FREESTYLE ADRIANA 14 DAY SENSOR) [facility] [place] ONE EVERY 14 DAYS 2 each 11     glimepiride (AMARYL) 2 MG tablet TAKE 3 TABLETS (6 MG) BY MOUTH EVERY MORNING (BEFORE BREAKFAST) 270 tablet 4     lisinopril (ZESTRIL) 5 MG tablet Take 1 tablet (5 mg) by mouth daily 90 tablet 3     metFORMIN (GLUCOPHAGE) 1000 MG tablet Take 1 tablet (1,000 mg) by mouth 2 times daily (with meals) 180 tablet 0     Multiple Vitamin (MULTIVITAMIN OR) Take 1 tablet by mouth daily.       nitroGLYcerin (NITROSTAT) 0.4 MG sublingual tablet For chest pain place 1 tablet under the tongue every 5 minutes for 3 doses. If symptoms persist 5 minutes after 1st dose call 911. 25 tablet 11     omeprazole (PRILOSEC) 20 MG DR capsule Take 20 mg by mouth twice a week Monday, Thursday         Family History   Problem Relation Age of Onset     Diabetes Mother 60        type 2     Hypertension Mother      Dementia Mother 90     Hypertension Father      Cerebrovascular Disease Father      Cancer Father 68        brain ca     Diabetes Maternal Uncle         type 1       Social History     Socioeconomic History     Marital status:      Spouse name: Not on file     Number of children: Not on file     Years of education: Not on file     Highest education level: Not on file   Occupational History     Not on file   Tobacco Use     Smoking status: Never Smoker     Smokeless tobacco: Never Used   Substance and Sexual Activity     Alcohol use: No     Alcohol/week: 0.0 standard drinks     Drug use: No     Sexual activity: Yes     Partners: Male     Birth control/protection: Post-menopausal, None   Other Topics Concern     Parent/sibling w/ CABG, MI or angioplasty before 65F 55M? 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    ETOH- none Exercise-  1 hr x 5 /week- Walks brisk, CC sk     Social Determinants of Health     Financial Resource Strain: Not on file   Food  Insecurity: Not on file   Transportation Needs: Not on file   Physical Activity: Not on file   Stress: Not on file   Social Connections: Not on file   Intimate Partner Violence: Not on file   Housing Stability: Not on file            Past Medical History:     Past Medical History:   Diagnosis Date     Atypical squamous cells of undetermined significance (ASCUS) on Papanicolaou smear of cervix 10/9/2015     GERD (gastroesophageal reflux disease)      HTN (hypertension)      Hyperlipidemia      Type 2 diabetes mellitus without complications (H) 10/22/2015              Past Surgical History:     Past Surgical History:   Procedure Laterality Date     APPENDECTOMY  1976    at time of USO     ARTHROSCOPY KNEE RT/LT       COLONOSCOPY N/A 11/9/2015    Procedure: COLONOSCOPY;  Surgeon: Kate Redmond MD;  Location:  GI     COLONOSCOPY N/A 2/14/2022    Procedure: COLONOSCOPY;  Surgeon: Kate Redmond MD;  Location:  GI     CV HEART CATHETERIZATION WITH POSSIBLE INTERVENTION N/A 2/28/2022    Procedure: Heart Catheterization with Possible Intervention;  Surgeon: Jh Powell MD;  Location:  HEART CARDIAC CATH LAB     [facility] LAP OVARIAN CYSTOTOMY  1976    salpingooopherectomy for large dermoid              Allergies:   Keflex [cephalexin hcl], Penicillins, and Ragweeds       Data:   All laboratory data reviewed:    Recent Labs   Lab Test 02/28/22  0635 02/27/22  0539 02/25/22  1857 12/01/20  0841 01/24/20  0829 04/16/18  0856 12/19/17  1439 09/18/17  0800   LDL 75 68 83   < > 77   < >  --   --    HDL 58 62 67   < > 59   < >  --   --    NHDL 107 99 129   < > 113   < >  --   --    CHOL 165 161 196   < > 172   < >  --   --    TRIG 158* 154* 232*   < > 178*   < >  --   --    TSH  --   --   --   --  3.33  --  3.47 4.78*    < > = values in this interval not displayed.       Lab Results   Component Value Date    WBC 5.2 02/28/2022    WBC 6.2 01/24/2020    RBC 4.27 02/28/2022    RBC 4.15 01/24/2020    HGB 11.8  02/28/2022    HGB 12.5 01/24/2020    HCT 37.5 02/28/2022    HCT 36.9 01/24/2020    MCV 88 02/28/2022    MCV 89 01/24/2020    MCH 29.7 02/28/2022    MCH 30.1 01/24/2020    MCHC 33.9 02/28/2022    MCHC 33.9 01/24/2020    RDW 12.4 02/28/2022    RDW 12.6 01/24/2020     02/28/2022     01/24/2020       Lab Results   Component Value Date     03/01/2022     12/01/2020    POTASSIUM 3.7 03/01/2022    POTASSIUM 4.5 12/01/2020    CHLORIDE 107 03/01/2022    CHLORIDE 108 12/01/2020    CO2 25 03/01/2022    CO2 25 12/01/2020    ANIONGAP 6 03/01/2022    ANIONGAP 5 12/01/2020     (H) 03/01/2022     (H) 12/01/2020    BUN 14 03/01/2022    BUN 19 12/01/2020    CR 0.62 03/01/2022    CR 0.69 12/01/2020    GFRESTIMATED >90 03/01/2022    GFRESTIMATED >90 12/01/2020    GFRESTBLACK >90 12/01/2020    CHRISTOPHER 9.6 03/01/2022    CHRISTOPHER 9.4 12/01/2020      Lab Results   Component Value Date    AST 17 02/25/2022    AST 15 12/01/2020    ALT 33 02/25/2022    ALT 27 12/01/2020       Lab Results   Component Value Date    A1C 6.8 (H) 03/10/2022    A1C 6.7 (H) 12/01/2020       No results found for: INR      DULCE MARIA JACOBSON CNP  [facility]  Pager: [phone]  RN phone: [phone]      Thank you for allowing me to participate in the care of your patient.      Sincerely,     DULCE MARIA JACOBSON CNP     [facility]  cc:   DULCE MARIA Whitfield CNP  [address]  [place] [id number]

## 2022-03-11 NOTE — PATIENT INSTRUCTIONS
Today's Recommendations    1. Start Carvedilol  3.125 mg two a day.  2. Cardiac rehab  3. Continue all other medications without changes.  4. Please follow up with Dr. Carmona in 3 months.    Please send a TruLeaf message or call [phone] to the RN team with questions or concerns.     Scheduling number [phone]    DULCE MARIA Rothman, CNP

## 2022-03-11 NOTE — PROGRESS NOTES
Cardiology Clinic Progress Note  Rubi Nicolas MRN# [id number]   YOB: 1960 Age: 61 year old     Primary cardiologist: Dr. Carmona    Reason for visit: Discharge follow up     History of presenting illness:    Rubi Nicolas, a pleasant 61 year old patient who has a past medical history significant for hypertension, diabetes, hyperlipidemia and GERD with recent non-STEMI.     She was admitted to [facility] 2/25/2022 with a 1 day history of chest pressure.  Her troponin peaked at 77 and underwent a coronary angiogram on 2/28/2022 with and underwent a drug-eluting stent to her mid LAD.  The night of the procedure a RRT was called due to a sinus pause of 5 to 6 seconds and a brief episode without palpable pulse requiring brief CPR when the patient regained her pulse to return to sinus rhythm.    Electrophysiology kindly saw the patient in consult due episode of asystole.  They felt it was neurally mediated (vasovagal) syncope after her cardiac catheterization.  No further EP evaluation was warranted and it was felt that beta-blockers were not contraindicated long-term.    She was discharged home on DAPT with aspirin and Brilinta, but called her clinic states she could not tolerate Brilinta due to dizziness and shortness of breath.  Subsequently, she was changed to Plavix with resolution of her symptoms.    Today she states she is doing overall well.  She denies any recurrent symptoms and has gone hiking since discharge.  She will be starting cardiac rehab next week and would like to pursue weeklong hiking trips in the future when more comfortable from a functional status post her MI.         Assessment and Plan:     ASSESSMENT:    1. NSTEMI    Troponin peak at 77    Echocardiogram showed LVEF of 6065% without regional wall or valvular abnormalities    Status post drug-eluting stent to the mid LAD    DAPT with aspirin and Plavix.  Did not tolerate Brilinta due to dizziness  and shortness of breath    2. Vasovagal syncope    5 to 6-second pauses with brief loss of consciousness and pulselessness that spontaneously returned    Evaluated by EP in note contraindication for beta-blockade    3. Hypertension    Controlled    Currently on lisinopril 5 mg daily    Beta-blocker was discontinued due to #2    4. Dyslipidemia    Prior to admission simvastatin was changed to rosuvastatin 20 mg daily    LDL 75 from 2/28/2022    5. T2DM    Patient is actively working on diet modifications and with her PCP for improved glucose control    PLAN:     1. Start cardiac rehab as previously scheduled  2. Start carvedilol 3.125 mg twice daily  3. Continue all other present medical therapy  4. Follow-up with Dr. Carmona in 3 months or sooner if needed       Orders this Visit:  Orders Placed This Encounter   Procedures     Follow-Up with Cardiology     Orders Placed This Encounter   Medications     Coenzyme Q10 (CO Q 10 PO)     carvedilol (COREG) 3.125 MG tablet     Sig: Take 1 tablet (3.125 mg) by mouth 2 times daily (with meals)     Dispense:  180 tablet     Refill:  3     atorvastatin (LIPITOR) 40 MG tablet     Sig: Take 1 tablet (40 mg) by mouth daily     Dispense:  30 tablet     Refill:  1     lisinopril (ZESTRIL) 5 MG tablet     Sig: Take 1 tablet (5 mg) by mouth daily     Dispense:  90 tablet     Refill:  3     Medications Discontinued During This Encounter   Medication Reason     lisinopril (ZESTRIL) 10 MG tablet      atorvastatin (LIPITOR) 40 MG tablet Reorder       Today's clinic visit entailed:  Review of the result(s) of each unique test - Echo, cath, troponin, FLP, BMP  Prescription drug management  25 minutes spent on the date of the encounter doing chart review, history and exam, documentation and further activities per the note  Provider  Link to [facility] Help Grid     The level of medical decision making during this visit was of moderate complexity.           Review of Systems:     Review of  "Systems:  Skin:  Negative     Eyes:  Positive for glasses  ENT:  Negative    Respiratory:  Negative shortness of breath;dyspnea on exertion  Cardiovascular:  chest pain;Negative;palpitations;edema;lightheadedness;dizziness    Gastroenterology: Negative    Genitourinary:  Negative    Musculoskeletal:  Negative    Neurologic:  Negative headaches  Psychiatric:  Negative    Heme/Lymph/Imm:  Positive for allergies  Endocrine:  Positive for diabetes            Physical Exam:   Vitals: /79 (BP Location: Right arm, Patient Position: Sitting)   Pulse 76   Ht 1.588 m (5' 2.5\")   Wt 63.3 kg (139 lb 8 oz)   SpO2 98%   BMI 25.11 kg/m    Constitutional:  cooperative;well developed        Head:  normocephalic        Eyes:  conjunctivae and lids unremarkable        ENT:  no pallor or cyanosis        Neck:  not assessed this visit        Chest:  clear to auscultation        Cardiac: regular rhythm                  Vascular: not assessed this visit                                 Right radial artery site clean dry and intact    Extremities and Back:  no edema               Medications:     Current Outpatient Medications   Medication Sig Dispense Refill     aspirin (ASA) 81 MG EC tablet Take 1 tablet (81 mg) by mouth daily 90 tablet 3     atorvastatin (LIPITOR) 40 MG tablet Take 1 tablet (40 mg) by mouth daily 30 tablet 1     carvedilol (COREG) 3.125 MG tablet Take 1 tablet (3.125 mg) by mouth 2 times daily (with meals) 180 tablet 3     clopidogrel (PLAVIX) 75 MG tablet Take 300 mg (4 tablets) as loading dose, then 75 mg daily thereafter. 94 tablet 3     Coenzyme Q10 (CO Q 10 PO)        Continuous Blood Gluc  (FREESTYLE ADRIANA 14 DAY READER) ROSALIE USE 1 DEVICE CONTINUOUSLY 1 each 0     Continuous Blood Gluc Sensor (FREESTYLE ADRIANA 14 DAY SENSOR) [facility] USE ONE EVERY 14 DAYS 2 each 11     glimepiride (AMARYL) 2 MG tablet TAKE 3 TABLETS (6 MG) BY MOUTH EVERY MORNING (BEFORE BREAKFAST) 270 tablet 4     lisinopril (ZESTRIL) " 5 MG tablet Take 1 tablet (5 mg) by mouth daily 90 tablet 3     metFORMIN (GLUCOPHAGE) 1000 MG tablet Take 1 tablet (1,000 mg) by mouth 2 times daily (with meals) 180 tablet 0     Multiple Vitamin (MULTIVITAMIN OR) Take 1 tablet by mouth daily.       nitroGLYcerin (NITROSTAT) 0.4 MG sublingual tablet For chest pain place 1 tablet under the tongue every 5 minutes for 3 doses. If symptoms persist 5 minutes after 1st dose call 911. 25 tablet 11     omeprazole (PRILOSEC) 20 MG DR capsule Take 20 mg by mouth twice a week Monday, Thursday         Family History   Problem Relation Age of Onset     Diabetes Mother 60        type 2     Hypertension Mother      Dementia Mother 90     Hypertension Father      Cerebrovascular Disease Father      Cancer Father 68        brain ca     Diabetes Maternal Uncle         type 1       Social History     Socioeconomic History     Marital status:      Spouse name: Not on file     Number of children: Not on file     Years of education: Not on file     Highest education level: Not on file   Occupational History     Not on file   Tobacco Use     Smoking status: Never Smoker     Smokeless tobacco: Never Used   Substance and Sexual Activity     Alcohol use: No     Alcohol/week: 0.0 standard drinks     Drug use: No     Sexual activity: Yes     Partners: Male     Birth control/protection: Post-menopausal, None   Other Topics Concern     Parent/sibling w/ CABG, MI or angioplasty before 65F 55M? 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    ETOH- none Exercise-  1 hr x 5 /week- Walks brisk, CC sk     Social Determinants of Health     Financial Resource Strain: Not on file   Food Insecurity: Not on file   Transportation Needs: Not on file   Physical Activity: Not on file   Stress: Not on file   Social Connections: Not on file   Intimate Partner Violence: Not on file   Housing Stability: Not  on file            Past Medical History:     Past Medical History:   Diagnosis Date     Atypical squamous cells of undetermined significance (ASCUS) on Papanicolaou smear of cervix 10/9/2015     GERD (gastroesophageal reflux disease)      HTN (hypertension)      Hyperlipidemia      Type 2 diabetes mellitus without complications (H) 10/22/2015              Past Surgical History:     Past Surgical History:   Procedure Laterality Date     APPENDECTOMY  1976    at time of USO     ARTHROSCOPY KNEE RT/LT       COLONOSCOPY N/A 11/9/2015    Procedure: COLONOSCOPY;  Surgeon: Kate Redmond MD;  Location:  GI     COLONOSCOPY N/A 2/14/2022    Procedure: COLONOSCOPY;  Surgeon: Kate Redmond MD;  Location:  GI     CV HEART CATHETERIZATION WITH POSSIBLE INTERVENTION N/A 2/28/2022    Procedure: Heart Catheterization with Possible Intervention;  Surgeon: Jh Powell MD;  Location:  HEART CARDIAC CATH LAB     [facility] LAP OVARIAN CYSTOTOMY  1976    salpingooopherectomy for large dermoid              Allergies:   Keflex [cephalexin hcl], Penicillins, and Ragweeds       Data:   All laboratory data reviewed:    Recent Labs   Lab Test 02/28/22  0635 02/27/22  0539 02/25/22  1857 12/01/20  0841 01/24/20  0829 04/16/18  0856 12/19/17  1439 09/18/17  0800   LDL 75 68 83   < > 77   < >  --   --    HDL 58 62 67   < > 59   < >  --   --    NHDL 107 99 129   < > 113   < >  --   --    CHOL 165 161 196   < > 172   < >  --   --    TRIG 158* 154* 232*   < > 178*   < >  --   --    TSH  --   --   --   --  3.33  --  3.47 4.78*    < > = values in this interval not displayed.       Lab Results   Component Value Date    WBC 5.2 02/28/2022    WBC 6.2 01/24/2020    RBC 4.27 02/28/2022    RBC 4.15 01/24/2020    HGB 11.8 02/28/2022    HGB 12.5 01/24/2020    HCT 37.5 02/28/2022    HCT 36.9 01/24/2020    MCV 88 02/28/2022    MCV 89 01/24/2020    MCH 29.7 02/28/2022    MCH 30.1 01/24/2020    MCHC 33.9 02/28/2022    [facility] 33.9 01/24/2020     RDW 12.4 02/28/2022    RDW 12.6 01/24/2020     02/28/2022     01/24/2020       Lab Results   Component Value Date     03/01/2022     12/01/2020    POTASSIUM 3.7 03/01/2022    POTASSIUM 4.5 12/01/2020    CHLORIDE 107 03/01/2022    CHLORIDE 108 12/01/2020    CO2 25 03/01/2022    CO2 25 12/01/2020    ANIONGAP 6 03/01/2022    ANIONGAP 5 12/01/2020     (H) 03/01/2022     (H) 12/01/2020    BUN 14 03/01/2022    BUN 19 12/01/2020    CR 0.62 03/01/2022    CR 0.69 12/01/2020    GFRESTIMATED >90 03/01/2022    GFRESTIMATED >90 12/01/2020    GFRESTBLACK >90 12/01/2020    CHRISTOPHER 9.6 03/01/2022    CHRISTOPHER 9.4 12/01/2020      Lab Results   Component Value Date    AST 17 02/25/2022    AST 15 12/01/2020    ALT 33 02/25/2022    ALT 27 12/01/2020       Lab Results   Component Value Date    A1C 6.8 (H) 03/10/2022    A1C 6.7 (H) 12/01/2020       No results found for: DULCE MARIA LENTZ [facility]  Pager: [phone]  RN phone: [phone]

## 2022-03-12 NOTE — RESULT ENCOUNTER NOTE
Rubi,    Your urine microalbumin was normal.  Your hemoglobin A1c is still at goal at <7%.   It is 6.8% so unchanged compared to 6 months ago.  I would not recommend any medication changes.    Luz Maria Demarco PA-C

## 2022-03-14 ENCOUNTER — HOSPITAL ENCOUNTER (OUTPATIENT)
Dept: CARDIAC REHAB | Facility: CLINIC | Age: 62
Discharge: HOME | End: 2022-03-14
Attending: INTERNAL MEDICINE
Payer: COMMERCIAL

## 2022-03-14 DIAGNOSIS — R79.89 ELEVATED TROPONIN: ICD-10-CM

## 2022-03-14 PROCEDURE — 93797 PHYS/QHP OP CAR RHAB WO ECG: CPT | Mod: 59

## 2022-03-14 PROCEDURE — 93798 PHYS/QHP OP CAR RHAB W/ECG: CPT

## 2022-03-21 ENCOUNTER — HOSPITAL ENCOUNTER (OUTPATIENT)
Dept: CARDIAC REHAB | Facility: CLINIC | Age: 62
Discharge: HOME | End: 2022-03-21
Attending: INTERNAL MEDICINE
Payer: COMMERCIAL

## 2022-03-21 PROCEDURE — 93798 PHYS/QHP OP CAR RHAB W/ECG: CPT

## 2022-03-25 DIAGNOSIS — I10 PRIMARY HYPERTENSION: ICD-10-CM

## 2022-03-25 DIAGNOSIS — I21.4 NSTEMI (NON-ST ELEVATED MYOCARDIAL INFARCTION) (H): ICD-10-CM

## 2022-03-25 RX ORDER — ATORVASTATIN CALCIUM 40 MG/1
40 TABLET, FILM COATED ORAL DAILY
Qty: 90 TABLET | Refills: 1 | Status: SHIPPED | OUTPATIENT
Start: 2022-03-25 | End: 2022-10-03

## 2022-03-28 ENCOUNTER — HOSPITAL ENCOUNTER (OUTPATIENT)
Dept: CARDIAC REHAB | Facility: CLINIC | Age: 62
Discharge: HOME | End: 2022-03-28
Attending: INTERNAL MEDICINE
Payer: COMMERCIAL

## 2022-03-28 PROCEDURE — 93798 PHYS/QHP OP CAR RHAB W/ECG: CPT | Performed by: OCCUPATIONAL THERAPIST

## 2022-04-04 ENCOUNTER — HOSPITAL ENCOUNTER (OUTPATIENT)
Dept: CARDIAC REHAB | Facility: CLINIC | Age: 62
Discharge: HOME | End: 2022-04-04
Attending: INTERNAL MEDICINE
Payer: COMMERCIAL

## 2022-04-04 PROCEDURE — 93798 PHYS/QHP OP CAR RHAB W/ECG: CPT

## 2022-04-18 ENCOUNTER — ALLIED HEALTH/NURSE VISIT (OUTPATIENT)
Dept: EDUCATION SERVICES | Facility: CLINIC | Age: 62
End: 2022-04-18
Attending: PHYSICIAN ASSISTANT
Payer: COMMERCIAL

## 2022-04-18 ENCOUNTER — HOSPITAL ENCOUNTER (OUTPATIENT)
Dept: CARDIAC REHAB | Facility: CLINIC | Age: 62
Discharge: HOME | End: 2022-04-18
Attending: INTERNAL MEDICINE
Payer: COMMERCIAL

## 2022-04-18 DIAGNOSIS — E11.9 DIABETES MELLITUS TYPE 2 WITHOUT RETINOPATHY (H): ICD-10-CM

## 2022-04-18 PROCEDURE — G0108 DIAB MANAGE TRN  PER INDIV: HCPCS | Performed by: DIETITIAN, REGISTERED

## 2022-04-18 PROCEDURE — 93798 PHYS/QHP OP CAR RHAB W/ECG: CPT | Performed by: CLINICAL EXERCISE PHYSIOLOGIST

## 2022-04-18 NOTE — Clinical Note
FYI, she had stopped her glimepiride as she was getting tired of lows.  Her shalonda data looks great tho the past 2 weeks (can see in my note) with emphasis on her diet.  Will have her continue just her metformin and reassess in a month again. Focused on her diet today and importance of heart healthy foods. Should she need an additional medication, we discussed either GLP1s or SGTL2's with proven CVD benefit. She would be on board with one of these if needed.  Will keep you posted! Let me know if you have questions/concerns.  Willow Gilbert, PATEL LD CDE

## 2022-04-18 NOTE — PROGRESS NOTES
Diabetes Self-Management Education & Support    Presents for: Individual review    ASSESSMENT:    Rubi was getting tired of lows with her glimepiride. Anytime she would go hiking she would get lows and need to treat them. This frustrated her because she is also trying to lose a few more pounds. Therefore, she started lowering her dose and then ultimately stopped it about a week ago.  She has been focusing on her diet and choosing lower carb foods.  Focused on heart health with her diet today given her recent MI.      At this time, her TIR is 89% the past 2 weeks. Therefore, would benefit from continuing to avoid the glimepiride for now and continue her focus on her diet.  Would like for her to record her foods to help ensure she is getting adequate variety, fiber, and heart healthy foods. Will plan to follow up in a month to see how realistic this plan is for maintaining it. Should she feel tired of it, could look into starting a SGLT2 such as Jardiance with proven CVD benefit.     Patient's most recent   Lab Results   Component Value Date    A1C 6.8 03/10/2022    A1C 6.7 12/01/2020    is meeting goal of <7.0    Diabetes knowledge and skills assessment:   Patient is knowledgeable in diabetes management concepts related to: Being Active, Monitoring, Problem Solving, Reducing Risks and Healthy Coping  Continue education with the following diabetes management concepts: Healthy Eating and Taking Medication  Based on learning assessment above, most appropriate setting for further diabetes education would be: Individual setting.    INTERVENTIONS:    Education provided today on:  AADE Self-Care Behaviors:  Healthy Eating: consistency in amount, composition, and timing of food intake, weight reduction and heart healthy diet. Reviewed importance of still having some carbs and not eliminating completely. Focused on heart health with her diet and choosing heart healthy fats, proteins, fish, fruits, whole grains.   Encouraged variety still with her diet to ensure she is having a balanced diet.   Monitoring: log and interpret results with her sanjuana data  Taking Medication: Educated on other medication options if Metformin and diet are not enough (SGLT2s and GLP1s with emphasis on ones that provide proven CVD benefit). Discussed potential side effects too. She would be open to these if needed. She does not like the KING.     Opportunities for ongoing education and support in diabetes-self management were discussed. Pt verbalized understanding of concepts discussed and recommendations provided today.       Education Materials Provided:  No new materials provided today      PLAN  Continue just Metformin and diet changes the next month.   Keep a food log until next visit.   Continue to use the Sanjuana sensor.     Topics to cover at upcoming visits: Healthy Eating, Monitoring and Taking Medication  Follow-up: 5/23 in person    See Goals Section for co-developed, patient-stated behavior change goals.  AVS provided to patient today.          SUBJECTIVE / OBJECTIVE:  Presents for: Individual review  Diabetes education in the past 24mo: Yes  Focus of Visit: CGM  Diabetes type: Type 2  Date of diagnosis: >20 years  Disease course: Stable  How confident are you filling out medical forms by yourself:: Not Assessed  Diabetes management related comments/concerns: Tired of the lows with a KING.  Has had some low blood sugars that took a while to correct.  Stopped her KING about a week ago. Has continued her Metformin and is focusing  On her diet.   Transportation concerns: No  Difficulty affording diabetes medication?: No  Difficulty affording diabetes testing supplies?: No  Other concerns:: Glasses  Cultural Influences/Ethnic Background:  Yes    Diabetes Symptoms & Complications:  Weight trend: Stable (might be down a few pounds per pt since January)  Complications assessed today?: Yes  Autonomic neuropathy: No  CVA: No  Heart disease:  "Yes  Nephropathy: No  Peripheral neuropathy: No  Retinopathy: No    Patient Problem List and Family Medical History reviewed for relevant medical history, current medical status, and diabetes risk factors.    Vitals:  There were no vitals taken for this visit.  Estimated body mass index is 25.11 kg/m  as calculated from the following:    Height as of 3/11/22: 1.588 m (5' 2.5\").    Weight as of 3/11/22: 63.3 kg (139 lb 8 oz).   Last 3 BP:   BP Readings from Last 3 Encounters:   03/11/22 121/79   03/10/22 130/81   03/01/22 128/82       History   Smoking Status     Never Smoker   Smokeless Tobacco     Never Used       Labs:  Lab Results   Component Value Date    A1C 6.8 03/10/2022    A1C 6.7 12/01/2020     Lab Results   Component Value Date     03/01/2022     12/01/2020     Lab Results   Component Value Date    LDL 75 02/28/2022     12/01/2020     HDL Cholesterol   Date Value Ref Range Status   12/01/2020 64 >49 mg/dL Final     Direct Measure HDL   Date Value Ref Range Status   02/28/2022 58 >=50 mg/dL Final   ]  GFR Estimate   Date Value Ref Range Status   03/01/2022 >90 >60 mL/min/1.73m2 Final     Comment:     Effective December 21, 2021 eGFRcr in adults is calculated using the 2021 CKD-EPI creatinine equation which includes age and gender (Rao wills al., NEJM, DOI: 10.1056/APFRkx2120245)   12/01/2020 >90 >60 mL/min/[1.73_m2] Final     Comment:     Non  GFR Calc  Starting 12/18/2018, serum creatinine based estimated GFR (eGFR) will be   calculated using the Chronic Kidney Disease Epidemiology Collaboration   (CKD-EPI) equation.       GFR Estimate If Black   Date Value Ref Range Status   12/01/2020 >90 >60 mL/min/[1.73_m2] Final     Comment:      GFR Calc  Starting 12/18/2018, serum creatinine based estimated GFR (eGFR) will be   calculated using the Chronic Kidney Disease Epidemiology Collaboration   (CKD-EPI) equation.       Lab Results   Component Value Date    CR " 0.62 03/01/2022    CR 0.69 12/01/2020     No results found for: MICROALBUMIN    Healthy Eating:  Healthy Eating Assessed Today: Yes  Cultural/Christianity diet restrictions?: No  Meal planning/habits: Avoiding sweets, Low carb  Meals include: Breakfast, Dinner, Lunch, Evening Snack  Beverages: Water, Coffee  Has patient met with a dietitian in the past?: Yes    More Mediterranean diet.    Eating low carb.   Breakfast: spinach, egg whites (2 eggs), a little swiss cheese, coffee with a little creamer  Lunch: yogurt with fruit on the bottom, vegetables (plate full), sandwich with 1 piece of bread (tuna or chicken)  Dinner: a lot of daniel over cauliflower rice  Trying to not snack. Will eat berries and a little apple.    Being Active:  Being Active Assessed Today: Yes  Exercise:: Yes  How intense was your typical exercise? : Moderate (like brisk walking)  Barrier to exercise: None    Monitoring:  Monitoring Assessed Today: Yes  Did patient bring glucose meter to appointment? : Yes  Blood Glucose Meter: CGM  Times checking blood sugar at home (number): 5+  Times checking blood sugar at home (per): Day  Blood glucose trend: Decreasing    Glucose data:                    Taking Medications:  Diabetes Medication(s)     Biguanides       metFORMIN (GLUCOPHAGE) 1000 MG tablet    Take 1 tablet (1,000 mg) by mouth 2 times daily (with meals)    Sulfonylureas       glimepiride (AMARYL) 2 MG tablet    TAKE 3 TABLETS (6 MG) BY MOUTH EVERY MORNING (BEFORE BREAKFAST)      NOT taking glimepiride lately.     Taking Medication Assessed Today: Yes  Current Treatments: Oral Medication (taken by mouth)  Problems taking diabetes medications regularly?: No  Diabetes medication side effects?: No    Problem Solving:  Problem Solving Assessed Today: Yes  Is the patient at risk for hypoglycemia?: Yes  Hypoglycemia Frequency: Weekly  Hypoglycemia Treatment: Other food  Is the patient at risk for DKA?: No      Reducing Risks:  Reducing Risks Assessed  Today: No  Diabetes Risks: Age over 45 years, Hyperlipidemia  CAD Risks: Diabetes Mellitus, Dyslipidemia    Healthy Coping:  Healthy Coping Assessed Today: Yes  Emotional response to diabetes: Ready to learn, Concern for health and well-being  Informal Support system:: Spouse  Stage of change: MAINTENANCE (Working to maintain change, with risk of relapse)  Patient Activation Measure Survey Score:  JUAN Score (Last Two) 10/5/2012   JUAN Raw Score 38   Activation Score 52.9   JUAN Level 2             Willow Gilbert RD LD CDE    Time Spent: 40 minutes  Encounter Type: Individual        Any diabetes medication dose changes were made via the Certified Diabetes Care & Education Protocol in collaboration with the patient's referring provider. A copy of this encounter was shared with the provider.

## 2022-05-08 DIAGNOSIS — E11.9 TYPE 2 DIABETES MELLITUS WITHOUT COMPLICATION, WITHOUT LONG-TERM CURRENT USE OF INSULIN (H): ICD-10-CM

## 2022-05-09 NOTE — TELEPHONE ENCOUNTER
Prescription approved per [facility] Refill Protocol.  Bindu Jackson RN

## 2022-05-23 ENCOUNTER — ALLIED HEALTH/NURSE VISIT (OUTPATIENT)
Dept: EDUCATION SERVICES | Facility: CLINIC | Age: 62
End: 2022-05-23
Payer: COMMERCIAL

## 2022-05-23 VITALS — BODY MASS INDEX: 23.94 KG/M2 | WEIGHT: 133 LBS

## 2022-05-23 DIAGNOSIS — E11.9 DIABETES MELLITUS TYPE 2 WITHOUT RETINOPATHY (H): Primary | ICD-10-CM

## 2022-05-23 PROCEDURE — G0108 DIAB MANAGE TRN  PER INDIV: HCPCS | Performed by: DIETITIAN, REGISTERED

## 2022-05-23 NOTE — LETTER
5/23/2022         RE: Rubi Nicolas  [address]  [facility] [address]        Dear Colleague,    Thank you for referring your patient, Rubi iNcolas, to the [facility] [place]. Please see a copy of my visit note below.        Diabetes Self-Management Education & Support    Presents for: Follow-up    Type of Visit: In Person    How would patient like to obtain AVS? MyChart    ASSESSMENT:  Rubi's TIR is > 90% and she is doing very well without her glimepiride. At this point, I would recommend to just continue metformin and diet control/exercise.     She will be due to check her a1c again mid June.       Patient's most recent   Lab Results   Component Value Date    A1C 6.8 03/10/2022    A1C 6.7 12/01/2020    is meeting goal of <7.0    Diabetes knowledge and skills assessment:   Patient is knowledgeable in diabetes management concepts related to: Healthy Eating, Being Active, Monitoring, Taking Medication, Problem Solving, Reducing Risks and Healthy Coping  Continue education with the following diabetes management concepts: Monitoring  Based on learning assessment above, most appropriate setting for further diabetes education would be: Group class or Individual setting.    INTERVENTIONS:    Education provided today on:  AADE Self-Care Behaviors:  Healthy Eating: Explained some sx that people feel when eating a very low carb diet such as lethargy, confusion, irritability and that if she is noticing these that I would recommend to increase her carb intake a bit. Praised her on her weight loss and variety with her protein and vegetables and still having some fruit.  Monitoring: log and interpret results    Opportunities for ongoing education and support in diabetes-self management were discussed. Pt verbalized understanding of concepts discussed and recommendations provided today.       Education Materials Provided:  No new materials provided today      PLAN  Continue  "shalonda sensor and current diet changes.   If starting to feel foggy at all, confused, lethargic with low carb diet then recommend to add in some more carbs.   A1c ordered. She can call to schedule a lab visit anytime after Bethany 10.   Continue to not take Glimepiride.     Follow-up: 7/11 scheduled    See Goals Section for co-developed, patient-stated behavior change goals.  AVS provided to patient today.          SUBJECTIVE / OBJECTIVE:  Presents for: Follow-up  Diabetes education in the past 24mo: Yes  Focus of Visit: CGM, Healthy Eating, Monitoring  Diabetes type: Type 2  Date of diagnosis: >20 years  Disease course: Improving  How confident are you filling out medical forms by yourself:: Not Assessed  Diabetes management related comments/concerns: Diet is going well.  Transportation concerns: No  Difficulty affording diabetes medication?: No  Difficulty affording diabetes testing supplies?: No  Other concerns:: Glasses  Cultural Influences/Ethnic Background:   or     Diabetes Symptoms & Complications:  Weight trend: Decreasing (might be down a few pounds per pt since January)  Complications assessed today?: Yes  Autonomic neuropathy: No  CVA: No  Heart disease: Yes  Nephropathy: No  Peripheral neuropathy: No  Retinopathy: No    Patient Problem List and Family Medical History reviewed for relevant medical history, current medical status, and diabetes risk factors.    Vitals:  Wt 60.3 kg (133 lb)   BMI 23.94 kg/m    Estimated body mass index is 23.94 kg/m  as calculated from the following:    Height as of 3/11/22: 1.588 m (5' 2.5\").    Weight as of this encounter: 60.3 kg (133 lb).   Last 3 BP:   BP Readings from Last 3 Encounters:   03/11/22 121/79   03/10/22 130/81   03/01/22 128/82       History   Smoking Status     Never Smoker   Smokeless Tobacco     Never Used       Labs:  Lab Results   Component Value Date    A1C 6.8 03/10/2022    A1C 6.7 12/01/2020     Lab Results   Component Value Date    GLC " 149 03/01/2022     12/01/2020     Lab Results   Component Value Date    LDL 75 02/28/2022     12/01/2020     HDL Cholesterol   Date Value Ref Range Status   12/01/2020 64 >49 mg/dL Final     Direct Measure HDL   Date Value Ref Range Status   02/28/2022 58 >=50 mg/dL Final   ]  GFR Estimate   Date Value Ref Range Status   03/01/2022 >90 >60 mL/min/1.73m2 Final     Comment:     Effective December 21, 2021 eGFRcr in adults is calculated using the 2021 CKD-EPI creatinine equation which includes age and gender (Rao et al., NEJM, DOI: 10.1056/LOLUfb9041343)   12/01/2020 >90 >60 mL/min/[1.73_m2] Final     Comment:     Non  GFR Calc  Starting 12/18/2018, serum creatinine based estimated GFR (eGFR) will be   calculated using the Chronic Kidney Disease Epidemiology Collaboration   (CKD-EPI) equation.       GFR Estimate If Black   Date Value Ref Range Status   12/01/2020 >90 >60 mL/min/[1.73_m2] Final     Comment:      GFR Calc  Starting 12/18/2018, serum creatinine based estimated GFR (eGFR) will be   calculated using the Chronic Kidney Disease Epidemiology Collaboration   (CKD-EPI) equation.       Lab Results   Component Value Date    CR 0.62 03/01/2022    CR 0.69 12/01/2020     No results found for: MICROALBUMIN    Healthy Eating:  Healthy Eating Assessed Today: Yes  Cultural/Yarsanism diet restrictions?: No  Meal planning/habits: Avoiding sweets, Low carb  Meals include: Breakfast, Dinner, Lunch, Evening Snack  Beverages: Water, Coffee  Has patient met with a dietitian in the past?: Yes    Breakfast: spinach/mushroom/swiss omelette OR egg white, pancake with almond flour, sugar free syrup OR fried egg and avocado and coffee OR egg white and blueberry pancake (almond flour, oat flour, WW flour, baking soda) and sugar free syrup  Lunch: mixed raw veggies, 6 oz yogurt with fruit, cauliflower crepe and 4 oz tuna, olive oil, amaral, zevia cola, mixed nuts OR mixed veg and crepe and  chicken salad, cottage cheese, nuts OR carrot sticks with crepe with peanut butter and lemon yogurt and 1 c popcorn OR egg salad on keto crackers and yogurt + peach and nuts and veggies  Dinner: chicken stirfry and cauliflower rice OR salmon, salad, pesto (rotini) and small portion of ice cream OR cauliflower crust pizza OR chicken breast and salad    Being Active:  Being Active Assessed Today: Yes  Exercise:: Yes  How intense was your typical exercise? : Moderate (like brisk walking)  Barrier to exercise: None    Monitoring:  Monitoring Assessed Today: Yes  Did patient bring glucose meter to appointment? : Yes  Blood Glucose Meter: CGM  Times checking blood sugar at home (number): 5+  Times checking blood sugar at home (per): Day  Blood glucose trend: Decreasing    Glucose data:                Taking Medications:  Diabetes Medication(s)     Biguanides       metFORMIN (GLUCOPHAGE) 1000 MG tablet    TAKE 1 TABLET BY MOUTH TWICE A DAY WITH MEALS    Sulfonylureas       glimepiride (AMARYL) 2 MG tablet    TAKE 3 TABLETS (6 MG) BY MOUTH EVERY MORNING (BEFORE BREAKFAST)      not taking glimepiride.     Taking Medication Assessed Today: Yes  Current Treatments: Oral Medication (taken by mouth)  Problems taking diabetes medications regularly?: No  Diabetes medication side effects?: No    Problem Solving:  Problem Solving Assessed Today: Yes  Is the patient at risk for hypoglycemia?: Yes  Hypoglycemia Frequency: Rarely  Hypoglycemia Treatment: Other food  Is the patient at risk for DKA?: No    Reducing Risks:  Reducing Risks Assessed Today: No  Diabetes Risks: Age over 45 years, Hyperlipidemia  CAD Risks: Diabetes Mellitus, Dyslipidemia    Healthy Coping:  Healthy Coping Assessed Today: Yes  Emotional response to diabetes: Ready to learn, Concern for health and well-being  Informal Support system:: Spouse  Stage of change: MAINTENANCE (Working to maintain change, with risk of relapse)  Patient Activation Measure Survey  Score:  JUAN Score (Last Two) 10/5/2012   JUAN Raw Score 38   Activation Score 52.9   JUAN Level 2             PATEL Gould CDCES    Time Spent: 30 minutes  Encounter Type: Individual        Any diabetes medication dose changes were made via the Certified Diabetes Care & Education Protocol in collaboration with the patient's referring provider. A copy of this encounter was shared with the provider.

## 2022-05-23 NOTE — Clinical Note
FYI, she is not taking glimepiride and still doing quite well with 95% of her glucose results in target (can see my note with her shalonda data).  She is focusing on diet/exercise/weight. Will follow up again in July. Ordered an A1c for mid June. Let me know if you have questions! PATEL Gould [facility]

## 2022-05-23 NOTE — PROGRESS NOTES
Diabetes Self-Management Education & Support    Presents for: Follow-up    Type of Visit: In Person    How would patient like to obtain AVS? MyChart    ASSESSMENT:  Rubi's TIR is > 90% and she is doing very well without her glimepiride. At this point, I would recommend to just continue metformin and diet control/exercise.     She will be due to check her a1c again mid June.       Patient's most recent   Lab Results   Component Value Date    A1C 6.8 03/10/2022    A1C 6.7 12/01/2020    is meeting goal of <7.0    Diabetes knowledge and skills assessment:   Patient is knowledgeable in diabetes management concepts related to: Healthy Eating, Being Active, Monitoring, Taking Medication, Problem Solving, Reducing Risks and Healthy Coping  Continue education with the following diabetes management concepts: Monitoring  Based on learning assessment above, most appropriate setting for further diabetes education would be: Group class or Individual setting.    INTERVENTIONS:    Education provided today on:  AADE Self-Care Behaviors:  Healthy Eating: Explained some sx that people feel when eating a very low carb diet such as lethargy, confusion, irritability and that if she is noticing these that I would recommend to increase her carb intake a bit. Praised her on her weight loss and variety with her protein and vegetables and still having some fruit.  Monitoring: log and interpret results    Opportunities for ongoing education and support in diabetes-self management were discussed. Pt verbalized understanding of concepts discussed and recommendations provided today.       Education Materials Provided:  No new materials provided today      PLAN  Continue shalonda sensor and current diet changes.   If starting to feel foggy at all, confused, lethargic with low carb diet then recommend to add in some more carbs.   A1c ordered. She can call to schedule a lab visit anytime after Bethany 10.   Continue to not take Glimepiride.  "    Follow-up: 7/11 scheduled    See Goals Section for co-developed, patient-stated behavior change goals.  AVS provided to patient today.          SUBJECTIVE / OBJECTIVE:  Presents for: Follow-up  Diabetes education in the past 24mo: Yes  Focus of Visit: CGM, Healthy Eating, Monitoring  Diabetes type: Type 2  Date of diagnosis: >20 years  Disease course: Improving  How confident are you filling out medical forms by yourself:: Not Assessed  Diabetes management related comments/concerns: Diet is going well.  Transportation concerns: No  Difficulty affording diabetes medication?: No  Difficulty affording diabetes testing supplies?: No  Other concerns:: Glasses  Cultural Influences/Ethnic Background:   or     Diabetes Symptoms & Complications:  Weight trend: Decreasing (might be down a few pounds per pt since January)  Complications assessed today?: Yes  Autonomic neuropathy: No  CVA: No  Heart disease: Yes  Nephropathy: No  Peripheral neuropathy: No  Retinopathy: No    Patient Problem List and Family Medical History reviewed for relevant medical history, current medical status, and diabetes risk factors.    Vitals:  Wt 60.3 kg (133 lb)   BMI 23.94 kg/m    Estimated body mass index is 23.94 kg/m  as calculated from the following:    Height as of 3/11/22: 1.588 m (5' 2.5\").    Weight as of this encounter: 60.3 kg (133 lb).   Last 3 BP:   BP Readings from Last 3 Encounters:   03/11/22 121/79   03/10/22 130/81   03/01/22 128/82       History   Smoking Status     Never Smoker   Smokeless Tobacco     Never Used       Labs:  Lab Results   Component Value Date    A1C 6.8 03/10/2022    A1C 6.7 12/01/2020     Lab Results   Component Value Date     03/01/2022     12/01/2020     Lab Results   Component Value Date    LDL 75 02/28/2022     12/01/2020     HDL Cholesterol   Date Value Ref Range Status   12/01/2020 64 >49 mg/dL Final     Direct Measure HDL   Date Value Ref Range Status   02/28/2022 " 58 >=50 mg/dL Final   ]  GFR Estimate   Date Value Ref Range Status   03/01/2022 >90 >60 mL/min/1.73m2 Final     Comment:     Effective December 21, 2021 eGFRcr in adults is calculated using the 2021 CKD-EPI creatinine equation which includes age and gender (Rao wills al., NE, DOI: 10.1056/QEDNyj4179414)   12/01/2020 >90 >60 mL/min/[1.73_m2] Final     Comment:     Non  GFR Calc  Starting 12/18/2018, serum creatinine based estimated GFR (eGFR) will be   calculated using the Chronic Kidney Disease Epidemiology Collaboration   (CKD-EPI) equation.       GFR Estimate If Black   Date Value Ref Range Status   12/01/2020 >90 >60 mL/min/[1.73_m2] Final     Comment:      GFR Calc  Starting 12/18/2018, serum creatinine based estimated GFR (eGFR) will be   calculated using the Chronic Kidney Disease Epidemiology Collaboration   (CKD-EPI) equation.       Lab Results   Component Value Date    CR 0.62 03/01/2022    CR 0.69 12/01/2020     No results found for: MICROALBUMIN    Healthy Eating:  Healthy Eating Assessed Today: Yes  Cultural/Jew diet restrictions?: No  Meal planning/habits: Avoiding sweets, Low carb  Meals include: Breakfast, Dinner, Lunch, Evening Snack  Beverages: Water, Coffee  Has patient met with a dietitian in the past?: Yes    Breakfast: spinach/mushroom/swiss omelette OR egg white, pancake with almond flour, sugar free syrup OR fried egg and avocado and coffee OR egg white and blueberry pancake (almond flour, oat flour, WW flour, baking soda) and sugar free syrup  Lunch: mixed raw veggies, 6 oz yogurt with fruit, cauliflower crepe and 4 oz tuna, olive oil, amaral, zevia cola, mixed nuts OR mixed veg and crepe and chicken salad, cottage cheese, nuts OR carrot sticks with crepe with peanut butter and lemon yogurt and 1 c popcorn OR egg salad on keto crackers and yogurt + peach and nuts and veggies  Dinner: chicken stirfry and cauliflower rice OR salmon, salad, pesto (rotini)  and small portion of ice cream OR cauliflower crust pizza OR chicken breast and salad    Being Active:  Being Active Assessed Today: Yes  Exercise:: Yes  How intense was your typical exercise? : Moderate (like brisk walking)  Barrier to exercise: None    Monitoring:  Monitoring Assessed Today: Yes  Did patient bring glucose meter to appointment? : Yes  Blood Glucose Meter: CGM  Times checking blood sugar at home (number): 5+  Times checking blood sugar at home (per): Day  Blood glucose trend: Decreasing    Glucose data:                Taking Medications:  Diabetes Medication(s)     Biguanides       metFORMIN (GLUCOPHAGE) 1000 MG tablet    TAKE 1 TABLET BY MOUTH TWICE A DAY WITH MEALS    Sulfonylureas       glimepiride (AMARYL) 2 MG tablet    TAKE 3 TABLETS (6 MG) BY MOUTH EVERY MORNING (BEFORE BREAKFAST)      not taking glimepiride.     Taking Medication Assessed Today: Yes  Current Treatments: Oral Medication (taken by mouth)  Problems taking diabetes medications regularly?: No  Diabetes medication side effects?: No    Problem Solving:  Problem Solving Assessed Today: Yes  Is the patient at risk for hypoglycemia?: Yes  Hypoglycemia Frequency: Rarely  Hypoglycemia Treatment: Other food  Is the patient at risk for DKA?: No    Reducing Risks:  Reducing Risks Assessed Today: No  Diabetes Risks: Age over 45 years, Hyperlipidemia  CAD Risks: Diabetes Mellitus, Dyslipidemia    Healthy Coping:  Healthy Coping Assessed Today: Yes  Emotional response to diabetes: Ready to learn, Concern for health and well-being  Informal Support system:: Spouse  Stage of change: MAINTENANCE (Working to maintain change, with risk of relapse)  Patient Activation Measure Survey Score:  JUAN Score (Last Two) 10/5/2012   JUAN Raw Score 38   Activation Score 52.9   JUAN Level 2             PATEL Gould CDCES    Time Spent: 30 minutes  Encounter Type: Individual        Any diabetes medication dose changes were made via the Certified Diabetes  Care & Education Protocol in collaboration with the patient's referring provider. A copy of this encounter was shared with the provider.

## 2022-06-20 ENCOUNTER — TELEPHONE (OUTPATIENT)
Dept: CARDIOLOGY | Facility: CLINIC | Age: 62
End: 2022-06-20
Payer: COMMERCIAL

## 2022-06-20 NOTE — TELEPHONE ENCOUNTER
"Rubi did call back.  She would not like a virtual visit, states \"what would that really accomplish, I would rather wait for in person\".  Writer inquired if she has any immediate concerns or issues that she wanted to address with Dr. Carmona.  She said she does not.    Writer requested  team to contact patient to reschedule the visit.    Received note from Daniela in scheduling that she has reached out to the patient.    Marlin Alonso RN on 6/20/2022 at 4:24 PM      "

## 2022-06-20 NOTE — TELEPHONE ENCOUNTER
Received information that Rubi has a positive CoVid test on 6\17\22.  She will need to either reschedule in clinic appointment with Dr. Carmona, or we can try to use her appointment tomorrow and change it to a virtual visit.    Writer has LVM for Rubi to let us know, direct nurse line given.    Will await patient's return call.    Marlin Alonso RN on 6/20/2022 at 2:06 PM

## 2022-07-06 ENCOUNTER — OFFICE VISIT (OUTPATIENT)
Dept: URGENT CARE | Facility: URGENT CARE | Age: 62
End: 2022-07-06

## 2022-07-06 ENCOUNTER — LAB (OUTPATIENT)
Dept: LAB | Facility: CLINIC | Age: 62
End: 2022-07-06
Attending: PHYSICIAN ASSISTANT
Payer: COMMERCIAL

## 2022-07-06 VITALS
DIASTOLIC BLOOD PRESSURE: 81 MMHG | OXYGEN SATURATION: 98 % | WEIGHT: 128 LBS | TEMPERATURE: 98.6 F | BODY MASS INDEX: 23.04 KG/M2 | HEART RATE: 73 BPM | SYSTOLIC BLOOD PRESSURE: 135 MMHG

## 2022-07-06 DIAGNOSIS — L02.215 PERINEAL ABSCESS: Primary | ICD-10-CM

## 2022-07-06 DIAGNOSIS — E11.9 DIABETES MELLITUS TYPE 2 WITHOUT RETINOPATHY (H): ICD-10-CM

## 2022-07-06 LAB — HBA1C MFR BLD: 7.1 % (ref 0–5.6)

## 2022-07-06 PROCEDURE — 83036 HEMOGLOBIN GLYCOSYLATED A1C: CPT

## 2022-07-06 PROCEDURE — 99213 OFFICE O/P EST LOW 20 MIN: CPT | Performed by: PHYSICIAN ASSISTANT

## 2022-07-06 PROCEDURE — 36415 COLL VENOUS BLD VENIPUNCTURE: CPT

## 2022-07-06 RX ORDER — CLINDAMYCIN HCL 300 MG
300 CAPSULE ORAL 4 TIMES DAILY
Qty: 40 CAPSULE | Refills: 0 | Status: SHIPPED | OUTPATIENT
Start: 2022-07-06 | End: 2022-07-16

## 2022-07-06 NOTE — PROGRESS NOTES
Patient presents with:  Urgent Care: cyst in vaginal area since 07/03/2022.    (L02.215) Perineal abscess  (primary encounter diagnosis)  Comment:   Plan: clindamycin (CLEOCIN) 300 MG capsule,         acetaminophen-codeine (TYLENOL #3) 300-30 MG         tablet            Add probiotic while on antibiotic, be sure to separate it from antibiotic dose by at least 3 hours.      Warm compress to area frequently    TO EMERGENCY DEPARTMENT should symptoms persist or worsen.        SUBJECTIVE:   Rubi Nicolas is a 61 year old female who presents today with a painful swollen abscess on the left side of her vagina.  Onset 7/3/22, significantly worse since yesterday.  Was on a canoe trip.  Started out as a small bump/pimple like lesion.  No drainage.      Denies any fevers.           Past Medical History:   Diagnosis Date     Atypical squamous cells of undetermined significance (ASCUS) on Papanicolaou smear of cervix 10/9/2015     GERD (gastroesophageal reflux disease)      HTN (hypertension)      Hyperlipidemia      Type 2 diabetes mellitus without complications (H) 10/22/2015         Current Outpatient Medications   Medication Sig Dispense Refill     Multiple Vitamins-Iron (DAILY-PAULY/IRON/BETA-CAROTENE) TABS TAKE 1 TABLET BY MOUTH DAILY. (Patient not taking: Reported on 10/19/2020) 30 tablet 7     Social History     Tobacco Use     Smoking status: Never Smoker     Smokeless tobacco: Never Used   Substance Use Topics     Alcohol use: Not on file     Family History   Problem Relation Age of Onset     Diabetes Mother      Diabetes Father          ROS:    10 point ROS of systems including Constitutional, Eyes, Respiratory, Cardiovascular, Gastroenterology, Genitourinary, Integumentary, Muscularskeletal, Psychiatric ,neurological were all negative except for pertinent positives noted in my HPI       OBJECTIVE:  /81 (BP Location: Left arm, Patient Position: Sitting, Cuff Size: Adult Regular)   Pulse 73   Temp 98.6   F (37  C) (Tympanic)   Wt 58.1 kg (128 lb)   SpO2 98%   BMI 23.04 kg/m    Physical Exam:  GENERAL APPEARANCE: healthy, alert and no distress  GU_female: erythematous, warm mass on right perineum adjacent to right labia majora at inferior aspect.    No drainage.  No fluctuance.

## 2022-07-06 NOTE — PATIENT INSTRUCTIONS
(L02.215) Perineal abscess  (primary encounter diagnosis)  Comment:   Plan: clindamycin (CLEOCIN) 300 MG capsule,         acetaminophen-codeine (TYLENOL #3) 300-30 MG         tablet            Add probiotic while on antibiotic, be sure to separate it from antibiotic dose by at least 3 hours.      Warm compress to area frequently    TO EMERGENCY DEPARTMENT should symptoms persist or worsen.

## 2022-07-06 NOTE — RESULT ENCOUNTER NOTE
Dear Rubi,     I'm covering for Luz Maria Demarco:    Here are your recent hemoglobin A1c results which are stable.    Please continue with your current plan of care and let us know if you have any questions or concerns.    Regards,  Johanne Alfredo PA-C

## 2022-07-11 ENCOUNTER — ALLIED HEALTH/NURSE VISIT (OUTPATIENT)
Dept: EDUCATION SERVICES | Facility: CLINIC | Age: 62
End: 2022-07-11
Payer: COMMERCIAL

## 2022-07-11 DIAGNOSIS — E11.9 TYPE 2 DIABETES MELLITUS WITHOUT COMPLICATION, WITHOUT LONG-TERM CURRENT USE OF INSULIN (H): ICD-10-CM

## 2022-07-11 DIAGNOSIS — E11.9 DIABETES MELLITUS TYPE 2 WITHOUT RETINOPATHY (H): Primary | ICD-10-CM

## 2022-07-11 PROCEDURE — G0108 DIAB MANAGE TRN  PER INDIV: HCPCS | Performed by: DIETITIAN, REGISTERED

## 2022-07-11 RX ORDER — LANCETS
1 EACH MISCELLANEOUS DAILY
Qty: 100 EACH | Refills: 4 | Status: SHIPPED | OUTPATIENT
Start: 2022-07-11

## 2022-07-11 NOTE — PROGRESS NOTES
"    Diabetes Self-Management Education & Support    Presents for: Follow-up    SUBJECTIVE/OBJECTIVE:  Presents for: Follow-up  Accompanied by: Self  Diabetes education in the past 24mo: Yes  Focus of Visit: CGM, Healthy Eating, Monitoring  Type of CGM visit: Personal CGM Follow-up  Diabetes type: Type 2  Date of diagnosis: >20 years  Disease course: Stable  How confident are you filling out medical forms by yourself:: Not Assessed  Diabetes management related comments/concerns: Bummed the A1c went up this last time.    [place]'s during this with food while on the trail (more carbs). Then had covid and BGs were higher with less exercise. Then she went to the [facility] and was eating camp food. Now has had an infection and still less exercise.   Transportation concerns: No  Difficulty affording diabetes medication?: No  Difficulty affording diabetes testing supplies?: No  Other concerns:: Glasses  Cultural Influences/Ethnic Background:   or     Diabetes Symptoms & Complications:  Weight trend: Decreasing (down ~12#)  Complications assessed today?: Yes  Autonomic neuropathy: No  CVA: No  Heart disease: Yes  Nephropathy: No  Peripheral neuropathy: No  Retinopathy: No    Patient Problem List and Family Medical History reviewed for relevant medical history, current medical status, and diabetes risk factors.    Vitals:  There were no vitals taken for this visit.  Estimated body mass index is 23.04 kg/m  as calculated from the following:    Height as of 3/11/22: 1.588 m (5' 2.5\").    Weight as of 7/6/22: 58.1 kg (128 lb).     Last 3 BP:   BP Readings from Last 3 Encounters:   07/06/22 135/81   03/11/22 121/79   03/10/22 130/81       History   Smoking Status     Never Smoker   Smokeless Tobacco     Never Used       Labs:  Lab Results   Component Value Date    A1C 7.1 07/06/2022    A1C 6.7 12/01/2020     Lab Results   Component Value Date     03/01/2022     " 12/01/2020     Lab Results   Component Value Date    LDL 75 02/28/2022     12/01/2020     HDL Cholesterol   Date Value Ref Range Status   12/01/2020 64 >49 mg/dL Final     Direct Measure HDL   Date Value Ref Range Status   02/28/2022 58 >=50 mg/dL Final   ]  GFR Estimate   Date Value Ref Range Status   03/01/2022 >90 >60 mL/min/1.73m2 Final     Comment:     Effective December 21, 2021 eGFRcr in adults is calculated using the 2021 CKD-EPI creatinine equation which includes age and gender (Rao et al., NEJ, DOI: 10.1056/QTDVwn4166923)   12/01/2020 >90 >60 mL/min/[1.73_m2] Final     Comment:     Non  GFR Calc  Starting 12/18/2018, serum creatinine based estimated GFR (eGFR) will be   calculated using the Chronic Kidney Disease Epidemiology Collaboration   (CKD-EPI) equation.       GFR Estimate If Black   Date Value Ref Range Status   12/01/2020 >90 >60 mL/min/[1.73_m2] Final     Comment:      GFR Calc  Starting 12/18/2018, serum creatinine based estimated GFR (eGFR) will be   calculated using the Chronic Kidney Disease Epidemiology Collaboration   (CKD-EPI) equation.       Lab Results   Component Value Date    CR 0.62 03/01/2022    CR 0.69 12/01/2020     No results found for: MICROALBUMIN    Healthy Eating:  Healthy Eating Assessed Today: Yes  Cultural/Taoist diet restrictions?: No  Meal planning/habits: Avoiding sweets, Low carb  Meals include: Breakfast, Dinner, Lunch, Evening Snack  Beverages: Water, Coffee  Has patient met with a dietitian in the past?: Yes          Being Active:  Being Active Assessed Today: Yes  Exercise:: Yes  How intense was your typical exercise? : Moderate (like brisk walking)  Barrier to exercise: None    Monitoring:  Monitoring Assessed Today: Yes  Did patient bring glucose meter to appointment? : Yes  Blood Glucose Meter: CGM  Times checking blood sugar at home (number): 5+  Times checking blood sugar at home (per): Day  Blood glucose trend: No  change          Taking Medications:  Diabetes Medication(s)     Biguanides       metFORMIN (GLUCOPHAGE) 1000 MG tablet    TAKE 1 TABLET BY MOUTH TWICE A DAY WITH MEALS    Sulfonylureas       glimepiride (AMARYL) 2 MG tablet    TAKE 3 TABLETS (6 MG) BY MOUTH EVERY MORNING (BEFORE BREAKFAST)     Patient not taking: Reported on 7/6/2022        Only taking Metformin still.     Taking Medication Assessed Today: Yes  Current Treatments: Oral Medication (taken by mouth)  Problems taking diabetes medications regularly?: No  Diabetes medication side effects?: No    Problem Solving:  Problem Solving Assessed Today: Yes  Is the patient at risk for hypoglycemia?: Yes  Hypoglycemia Frequency: Rarely  Hypoglycemia Treatment: Other food  Is the patient at risk for DKA?: No      Reducing Risks:  Reducing Risks Assessed Today: No  Diabetes Risks: Age over 45 years, Hyperlipidemia  CAD Risks: Diabetes Mellitus, Dyslipidemia    Healthy Coping:  Healthy Coping Assessed Today: Yes  Emotional response to diabetes: Ready to learn, Concern for health and well-being  Informal Support system:: Spouse  Stage of change: MAINTENANCE (Working to maintain change, with risk of relapse)  Patient Activation Measure Survey Score:  JUAN Score (Last Two) 10/5/2012   JUAN Raw Score 38   Activation Score 52.9   JUAN Level 2       Diabetes knowledge and skills assessment:   Patient is knowledgeable in diabetes management concepts related to: Healthy Eating, Being Active, Taking Medication, Problem Solving, Reducing Risks and Healthy Coping  Patient needs further education on the following diabetes management concepts: Monitoring  Based on learning assessment above, most appropriate setting for further diabetes education would be: Individual setting.      INTERVENTIONS:    Education provided today on:  AADE Self-Care Behaviors:  Monitoring: using a BG meter with proper technique, frequency of monitoring, use of glucose control solution and proper sharps  disposal. Discussed that since the shalonda sensors are expensive she can just put one on occasionally if desired and wear for the 2 weeks.     Opportunities for ongoing education and support in diabetes-self management were discussed.    Pt verbalized understanding of concepts discussed and recommendations provided today.       Education Materials Provided:  Contour Next One Meter Kit      ASSESSMENT:  Rubi's last a1c aminta from 6.8 to 7.1% likely r/t less exercise, different eating patterns with hiking, covid, and now an infection. She also reports the shalonda sensors are costly and wondering if she needs to continue those since she is not taking the medication that can raise risk for hypoglycemia now.  Educated that she could always keep her Rx for shalonda sensors and refill as desired and then use a BGM to check her BG once daily in between.  She is not sure she still has a BGM so supplied her with one today. Reviewed how to use it as well.     Anticipate her numbers will improve with being able to hike again and exercise soon and with eating her more usual diet. [place]         Patient's most recent   Lab Results   Component Value Date    A1C 7.1 07/06/2022    A1C 6.7 12/01/2020    is not meeting goal of <7.0    PLAN  See Patient Instructions for co-developed, patient-stated behavior change goals.  Future A1c ordered for Oct. Then she will call and schedule a follow up CDE visit.   Ordered lancets and test strips for her new contour next one meter.   AVS printed and provided to patient today. See Follow-Up section for recommended follow-up.    Willow Gilbert, PATEL LD JOSEFES    Time Spent: 40 minutes  Encounter Type: Individual    Any diabetes medication dose changes were made via the CDE Protocol and Collaborative Practice Agreement with the patient's referring provider. A copy of this encounter was shared with the provider.

## 2022-07-11 NOTE — LETTER
"    7/11/2022         RE: Rubi Nicolsa  [address]  [place] [place] [id number]        Dear Colleague,    Thank you for referring your patient, Rubi Nicolas, to the [facility] [place]. Please see a copy of my visit note below.        Diabetes Self-Management Education & Support    Presents for: Follow-up    SUBJECTIVE/OBJECTIVE:  Presents for: Follow-up  Accompanied by: Self  Diabetes education in the past 24mo: Yes  Focus of Visit: CGM, Healthy Eating, Monitoring  Type of CGM visit: Personal CGM Follow-up  Diabetes type: Type 2  Date of diagnosis: >20 years  Disease course: Stable  How confident are you filling out medical forms by yourself:: Not Assessed  Diabetes management related comments/concerns: Bummed the A1c went up this last time.    [place] BG's during this with food while on the trail (more carbs). Then had covid and BGs were higher with less exercise. Then she went to the [facility] and was eating camp food. Now has had an infection and still less exercise.   Transportation concerns: No  Difficulty affording diabetes medication?: No  Difficulty affording diabetes testing supplies?: No  Other concerns:: Glasses  Cultural Influences/Ethnic Background:   or     Diabetes Symptoms & Complications:  Weight trend: Decreasing (down ~12#)  Complications assessed today?: Yes  Autonomic neuropathy: No  CVA: No  Heart disease: Yes  Nephropathy: No  Peripheral neuropathy: No  Retinopathy: No    Patient Problem List and Family Medical History reviewed for relevant medical history, current medical status, and diabetes risk factors.    Vitals:  There were no vitals taken for this visit.  Estimated body mass index is 23.04 kg/m  as calculated from the following:    Height as of 3/11/22: 1.588 m (5' 2.5\").    Weight as of 7/6/22: 58.1 kg (128 lb).     Last 3 BP:   BP Readings from Last 3 Encounters:   07/06/22 135/81   03/11/22 " 121/79   03/10/22 130/81       History   Smoking Status     Never Smoker   Smokeless Tobacco     Never Used       Labs:  Lab Results   Component Value Date    A1C 7.1 07/06/2022    A1C 6.7 12/01/2020     Lab Results   Component Value Date     03/01/2022     12/01/2020     Lab Results   Component Value Date    LDL 75 02/28/2022     12/01/2020     HDL Cholesterol   Date Value Ref Range Status   12/01/2020 64 >49 mg/dL Final     Direct Measure HDL   Date Value Ref Range Status   02/28/2022 58 >=50 mg/dL Final   ]  GFR Estimate   Date Value Ref Range Status   03/01/2022 >90 >60 mL/min/1.73m2 Final     Comment:     Effective December 21, 2021 eGFRcr in adults is calculated using the 2021 CKD-EPI creatinine equation which includes age and gender (Rao wills al., NEJ, DOI: 10.1056/NCABps4868412)   12/01/2020 >90 >60 mL/min/[1.73_m2] Final     Comment:     Non  GFR Calc  Starting 12/18/2018, serum creatinine based estimated GFR (eGFR) will be   calculated using the Chronic Kidney Disease Epidemiology Collaboration   (CKD-EPI) equation.       GFR Estimate If Black   Date Value Ref Range Status   12/01/2020 >90 >60 mL/min/[1.73_m2] Final     Comment:      GFR Calc  Starting 12/18/2018, serum creatinine based estimated GFR (eGFR) will be   calculated using the Chronic Kidney Disease Epidemiology Collaboration   (CKD-EPI) equation.       Lab Results   Component Value Date    CR 0.62 03/01/2022    CR 0.69 12/01/2020     No results found for: MICROALBUMIN    Healthy Eating:  Healthy Eating Assessed Today: Yes  Cultural/Yazidi diet restrictions?: No  Meal planning/habits: Avoiding sweets, Low carb  Meals include: Breakfast, Dinner, Lunch, Evening Snack  Beverages: Water, Coffee  Has patient met with a dietitian in the past?: Yes          Being Active:  Being Active Assessed Today: Yes  Exercise:: Yes  How intense was your typical exercise? : Moderate (like brisk  walking)  Barrier to exercise: None    Monitoring:  Monitoring Assessed Today: Yes  Did patient bring glucose meter to appointment? : Yes  Blood Glucose Meter: CGM  Times checking blood sugar at home (number): 5+  Times checking blood sugar at home (per): Day  Blood glucose trend: No change          Taking Medications:  Diabetes Medication(s)     Biguanides       metFORMIN (GLUCOPHAGE) 1000 MG tablet    TAKE 1 TABLET BY MOUTH TWICE A DAY WITH MEALS    Sulfonylureas       glimepiride (AMARYL) 2 MG tablet    TAKE 3 TABLETS (6 MG) BY MOUTH EVERY MORNING (BEFORE BREAKFAST)     Patient not taking: Reported on 7/6/2022        Only taking Metformin still.     Taking Medication Assessed Today: Yes  Current Treatments: Oral Medication (taken by mouth)  Problems taking diabetes medications regularly?: No  Diabetes medication side effects?: No    Problem Solving:  Problem Solving Assessed Today: Yes  Is the patient at risk for hypoglycemia?: Yes  Hypoglycemia Frequency: Rarely  Hypoglycemia Treatment: Other food  Is the patient at risk for DKA?: No      Reducing Risks:  Reducing Risks Assessed Today: No  Diabetes Risks: Age over 45 years, Hyperlipidemia  CAD Risks: Diabetes Mellitus, Dyslipidemia    Healthy Coping:  Healthy Coping Assessed Today: Yes  Emotional response to diabetes: Ready to learn, Concern for health and well-being  Informal Support system:: Spouse  Stage of change: MAINTENANCE (Working to maintain change, with risk of relapse)  Patient Activation Measure Survey Score:  JUAN Score (Last Two) 10/5/2012   JUAN Raw Score 38   Activation Score 52.9   JUAN Level 2       Diabetes knowledge and skills assessment:   Patient is knowledgeable in diabetes management concepts related to: Healthy Eating, Being Active, Taking Medication, Problem Solving, Reducing Risks and Healthy Coping  Patient needs further education on the following diabetes management concepts: Monitoring  Based on learning assessment above, most  appropriate setting for further diabetes education would be: Individual setting.      INTERVENTIONS:    Education provided today on:  AADE Self-Care Behaviors:  Monitoring: using a BG meter with proper technique, frequency of monitoring, use of glucose control solution and proper sharps disposal. Discussed that since the shalonda sensors are expensive she can just put one on occasionally if desired and wear for the 2 weeks.     Opportunities for ongoing education and support in diabetes-self management were discussed.    Pt verbalized understanding of concepts discussed and recommendations provided today.       Education Materials Provided:  Contour Next One Meter Kit      ASSESSMENT:  Rubi's last a1c aminta from 6.8 to 7.1% likely r/t less exercise, different eating patterns with hiking, covid, and now an infection. She also reports the shalonda sensors are costly and wondering if she needs to continue those since she is not taking the medication that can raise risk for hypoglycemia now.  Educated that she could always keep her Rx for shalonda sensors and refill as desired and then use a BGM to check her BG once daily in between.  She is not sure she still has a BGM so supplied her with one today. Reviewed how to use it as well.     Anticipate her numbers will improve with being able to hike again and exercise soon and with eating her more usual diet. [place]         Patient's most recent   Lab Results   Component Value Date    A1C 7.1 07/06/2022    A1C 6.7 12/01/2020    is not meeting goal of <7.0    PLAN  See Patient Instructions for co-developed, patient-stated behavior change goals.  Future A1c ordered for Oct. Then she will call and schedule a follow up CDE visit.   Ordered lancets and test strips for her new contour next one meter.   AVS printed and provided to patient today. See Follow-Up section for recommended follow-up.    Willow  PATEL Gilbert    Time Spent: 40 minutes  Encounter Type: Individual    Any diabetes medication dose changes were made via the CDE Protocol and Collaborative Practice Agreement with the patient's referring provider. A copy of this encounter was shared with the provider.

## 2022-07-12 DIAGNOSIS — E11.9 TYPE 2 DIABETES MELLITUS WITHOUT COMPLICATION, WITHOUT LONG-TERM CURRENT USE OF INSULIN (H): ICD-10-CM

## 2022-07-14 RX ORDER — GLIMEPIRIDE 2 MG/1
6 TABLET ORAL
Qty: 270 TABLET | Refills: 0 | Status: SHIPPED | OUTPATIENT
Start: 2022-07-14 | End: 2023-07-05

## 2022-07-14 NOTE — TELEPHONE ENCOUNTER
Medication filled one time only as pt is due for a follow-up for further refills.  Pharmacy has been notified to inform patient to call clinic and schedule appointment. and My chart has been sent to patient notifying to schedule appointment.  Danica Clifford RN

## 2022-08-04 ENCOUNTER — OFFICE VISIT (OUTPATIENT)
Dept: FAMILY MEDICINE | Facility: CLINIC | Age: 62
End: 2022-08-04
Payer: COMMERCIAL

## 2022-08-04 VITALS
HEART RATE: 69 BPM | OXYGEN SATURATION: 99 % | WEIGHT: 126 LBS | SYSTOLIC BLOOD PRESSURE: 134 MMHG | BODY MASS INDEX: 22.32 KG/M2 | RESPIRATION RATE: 16 BRPM | DIASTOLIC BLOOD PRESSURE: 82 MMHG | HEIGHT: 63 IN | TEMPERATURE: 97.9 F

## 2022-08-04 DIAGNOSIS — E78.5 HYPERLIPIDEMIA LDL GOAL <70: ICD-10-CM

## 2022-08-04 DIAGNOSIS — E11.9 TYPE 2 DIABETES MELLITUS WITHOUT COMPLICATION, WITHOUT LONG-TERM CURRENT USE OF INSULIN (H): ICD-10-CM

## 2022-08-04 DIAGNOSIS — I10 PRIMARY HYPERTENSION: ICD-10-CM

## 2022-08-04 DIAGNOSIS — L02.215 PERINEAL ABSCESS: Primary | ICD-10-CM

## 2022-08-04 DIAGNOSIS — Z23 NEED FOR VACCINATION: ICD-10-CM

## 2022-08-04 PROBLEM — R07.89 CHEST PRESSURE: Status: RESOLVED | Noted: 2022-02-25 | Resolved: 2022-08-04

## 2022-08-04 PROCEDURE — 90715 TDAP VACCINE 7 YRS/> IM: CPT | Performed by: INTERNAL MEDICINE

## 2022-08-04 PROCEDURE — 90677 PCV20 VACCINE IM: CPT | Performed by: INTERNAL MEDICINE

## 2022-08-04 PROCEDURE — 90472 IMMUNIZATION ADMIN EACH ADD: CPT | Performed by: INTERNAL MEDICINE

## 2022-08-04 PROCEDURE — 90471 IMMUNIZATION ADMIN: CPT | Performed by: INTERNAL MEDICINE

## 2022-08-04 PROCEDURE — 99213 OFFICE O/P EST LOW 20 MIN: CPT | Mod: 25 | Performed by: INTERNAL MEDICINE

## 2022-08-04 NOTE — PROGRESS NOTES
Assessment & Plan     Perineal abscess  Improved. Keep the area clean and dry.    Type 2 diabetes mellitus without complication, without long-term current use of insulin (H)  Stable. Cont meds.    Hyperlipidemia LDL goal <70  Stable. Cont meds.    Primary hypertension  Stable. Cont meds.     See Patient Instructions    Return in about 3 months (around 11/4/2022) for Follow up, Routine preventive, with me.    STEPHEN GAYTAN MD  [facility] JESSI Venegas is a 61 year old, presenting for the following health issues:  No chief complaint on file.      BARBI Venegas is a 61 year old lady who presented to the clinic for follow up on perineal abscess.   3 weeks ago she had an abscess in her groin which was the size of a golf ball.   She went to the ER and was given clindamycin.   On exam today, there is a swelling in the same area, which has significantly reduced and is non tender.   The swelling is 1 cm in size. No opening or drainage noted.   She denies pain.     Review of Systems       Objective    There were no vitals taken for this visit.  There is no height or weight on file to calculate BMI.  Physical Exam               .  ..

## 2022-10-03 DIAGNOSIS — I10 PRIMARY HYPERTENSION: ICD-10-CM

## 2022-10-03 DIAGNOSIS — I21.4 NSTEMI (NON-ST ELEVATED MYOCARDIAL INFARCTION) (H): ICD-10-CM

## 2022-10-03 RX ORDER — ATORVASTATIN CALCIUM 40 MG/1
40 TABLET, FILM COATED ORAL DAILY
Qty: 90 TABLET | Refills: 1 | Status: SHIPPED | OUTPATIENT
Start: 2022-10-03 | End: 2022-11-09

## 2022-10-10 ENCOUNTER — HEALTH MAINTENANCE LETTER (OUTPATIENT)
Age: 62
End: 2022-10-10

## 2022-11-07 ENCOUNTER — OFFICE VISIT (OUTPATIENT)
Dept: FAMILY MEDICINE | Facility: CLINIC | Age: 62
End: 2022-11-07
Payer: COMMERCIAL

## 2022-11-07 VITALS
SYSTOLIC BLOOD PRESSURE: 137 MMHG | RESPIRATION RATE: 16 BRPM | BODY MASS INDEX: 22.86 KG/M2 | HEART RATE: 61 BPM | OXYGEN SATURATION: 100 % | WEIGHT: 124.2 LBS | DIASTOLIC BLOOD PRESSURE: 77 MMHG | TEMPERATURE: 97 F | HEIGHT: 62 IN

## 2022-11-07 DIAGNOSIS — R42 DIZZINESS: ICD-10-CM

## 2022-11-07 DIAGNOSIS — L02.215 PERINEAL ABSCESS: ICD-10-CM

## 2022-11-07 DIAGNOSIS — Z11.4 SCREENING FOR HIV (HUMAN IMMUNODEFICIENCY VIRUS): ICD-10-CM

## 2022-11-07 DIAGNOSIS — K14.3 BLACK HAIRY TONGUE: ICD-10-CM

## 2022-11-07 DIAGNOSIS — I10 PRIMARY HYPERTENSION: ICD-10-CM

## 2022-11-07 DIAGNOSIS — R19.7 DIARRHEA, UNSPECIFIED TYPE: ICD-10-CM

## 2022-11-07 DIAGNOSIS — E11.9 TYPE 2 DIABETES MELLITUS WITHOUT COMPLICATION, WITHOUT LONG-TERM CURRENT USE OF INSULIN (H): ICD-10-CM

## 2022-11-07 DIAGNOSIS — Z12.4 CERVICAL CANCER SCREENING: ICD-10-CM

## 2022-11-07 DIAGNOSIS — Z00.00 ANNUAL PHYSICAL EXAM: Primary | ICD-10-CM

## 2022-11-07 LAB
ALBUMIN SERPL-MCNC: 3.9 G/DL (ref 3.4–5)
ALP SERPL-CCNC: 41 U/L (ref 40–150)
ALT SERPL W P-5'-P-CCNC: 27 U/L (ref 0–50)
ANION GAP SERPL CALCULATED.3IONS-SCNC: 8 MMOL/L (ref 3–14)
AST SERPL W P-5'-P-CCNC: 16 U/L (ref 0–45)
BILIRUB SERPL-MCNC: 0.5 MG/DL (ref 0.2–1.3)
BUN SERPL-MCNC: 13 MG/DL (ref 7–30)
CALCIUM SERPL-MCNC: 9.6 MG/DL (ref 8.5–10.1)
CHLORIDE BLD-SCNC: 103 MMOL/L (ref 94–109)
CHOLEST SERPL-MCNC: 195 MG/DL
CO2 SERPL-SCNC: 25 MMOL/L (ref 20–32)
CREAT SERPL-MCNC: 0.47 MG/DL (ref 0.52–1.04)
ERYTHROCYTE [DISTWIDTH] IN BLOOD BY AUTOMATED COUNT: 12.3 % (ref 10–15)
FASTING STATUS PATIENT QL REPORTED: YES
FOLATE SERPL-MCNC: 13.3 NG/ML (ref 4.6–34.8)
GFR SERPL CREATININE-BSD FRML MDRD: >90 ML/MIN/1.73M2
GLUCOSE BLD-MCNC: 164 MG/DL (ref 70–99)
HBA1C MFR BLD: 7.3 % (ref 0–5.6)
HCT VFR BLD AUTO: 37.8 % (ref 35–47)
HDLC SERPL-MCNC: 81 MG/DL
HGB BLD-MCNC: 13 G/DL (ref 11.7–15.7)
HIV 1+2 AB+HIV1 P24 AG SERPL QL IA: NONREACTIVE
IGA SERPL-MCNC: 370 MG/DL (ref 84–499)
LDLC SERPL CALC-MCNC: 92 MG/DL
MCH RBC QN AUTO: 30.7 PG (ref 26.5–33)
MCHC RBC AUTO-ENTMCNC: 34.4 G/DL (ref 31.5–36.5)
MCV RBC AUTO: 89 FL (ref 78–100)
NONHDLC SERPL-MCNC: 114 MG/DL
PLATELET # BLD AUTO: 257 10E3/UL (ref 150–450)
POTASSIUM BLD-SCNC: 3.3 MMOL/L (ref 3.4–5.3)
PROT SERPL-MCNC: 7.7 G/DL (ref 6.8–8.8)
RBC # BLD AUTO: 4.24 10E6/UL (ref 3.8–5.2)
SODIUM SERPL-SCNC: 136 MMOL/L (ref 133–144)
TRIGL SERPL-MCNC: 110 MG/DL
VIT B12 SERPL-MCNC: 2580 PG/ML (ref 232–1245)
WBC # BLD AUTO: 6.1 10E3/UL (ref 4–11)

## 2022-11-07 PROCEDURE — 80061 LIPID PANEL: CPT | Performed by: INTERNAL MEDICINE

## 2022-11-07 PROCEDURE — 82784 ASSAY IGA/IGD/IGG/IGM EACH: CPT | Performed by: INTERNAL MEDICINE

## 2022-11-07 PROCEDURE — 87389 HIV-1 AG W/HIV-1&-2 AB AG IA: CPT | Performed by: INTERNAL MEDICINE

## 2022-11-07 PROCEDURE — 82746 ASSAY OF FOLIC ACID SERUM: CPT | Performed by: INTERNAL MEDICINE

## 2022-11-07 PROCEDURE — 36415 COLL VENOUS BLD VENIPUNCTURE: CPT | Performed by: INTERNAL MEDICINE

## 2022-11-07 PROCEDURE — 83036 HEMOGLOBIN GLYCOSYLATED A1C: CPT | Performed by: INTERNAL MEDICINE

## 2022-11-07 PROCEDURE — 80053 COMPREHEN METABOLIC PANEL: CPT | Performed by: INTERNAL MEDICINE

## 2022-11-07 PROCEDURE — 86258 DGP ANTIBODY EACH IG CLASS: CPT | Performed by: INTERNAL MEDICINE

## 2022-11-07 PROCEDURE — 85027 COMPLETE CBC AUTOMATED: CPT | Performed by: INTERNAL MEDICINE

## 2022-11-07 PROCEDURE — 87624 HPV HI-RISK TYP POOLED RSLT: CPT | Performed by: INTERNAL MEDICINE

## 2022-11-07 PROCEDURE — G0123 SCREEN CERV/VAG THIN LAYER: HCPCS | Performed by: INTERNAL MEDICINE

## 2022-11-07 PROCEDURE — 86231 EMA EACH IG CLASS: CPT | Mod: 90 | Performed by: INTERNAL MEDICINE

## 2022-11-07 PROCEDURE — 99214 OFFICE O/P EST MOD 30 MIN: CPT | Mod: 25 | Performed by: INTERNAL MEDICINE

## 2022-11-07 PROCEDURE — 99000 SPECIMEN HANDLING OFFICE-LAB: CPT | Performed by: INTERNAL MEDICINE

## 2022-11-07 PROCEDURE — 86364 TISS TRNSGLTMNASE EA IG CLAS: CPT | Performed by: INTERNAL MEDICINE

## 2022-11-07 PROCEDURE — 99396 PREV VISIT EST AGE 40-64: CPT | Performed by: INTERNAL MEDICINE

## 2022-11-07 PROCEDURE — 82607 VITAMIN B-12: CPT | Performed by: INTERNAL MEDICINE

## 2022-11-07 RX ORDER — NYSTATIN 100000/ML
500000 SUSPENSION, ORAL (FINAL DOSE FORM) ORAL 4 TIMES DAILY
Qty: 400 ML | Refills: 1 | Status: SHIPPED | OUTPATIENT
Start: 2022-11-07 | End: 2023-05-22

## 2022-11-07 ASSESSMENT — ENCOUNTER SYMPTOMS
SORE THROAT: 0
DIZZINESS: 1
JOINT SWELLING: 0
HEMATURIA: 0
NERVOUS/ANXIOUS: 0
NAUSEA: 0
HEARTBURN: 0
EYE PAIN: 0
FREQUENCY: 0
BREAST MASS: 0
CONSTIPATION: 0
HEADACHES: 0
CHILLS: 0
WEAKNESS: 0
DIARRHEA: 1
HEMATOCHEZIA: 0
PALPITATIONS: 0
ARTHRALGIAS: 0
COUGH: 0
ABDOMINAL PAIN: 0
DYSURIA: 0
PARESTHESIAS: 0
FEVER: 0
MYALGIAS: 0
SHORTNESS OF BREATH: 0

## 2022-11-07 ASSESSMENT — PAIN SCALES - GENERAL: PAINLEVEL: NO PAIN (0)

## 2022-11-07 NOTE — PATIENT INSTRUCTIONS
You are due for mammogram.  Please call the following number to make appointment :  [phone]  It is located in [address]      Please call [facility] at [phone] to schedule your imaging

## 2022-11-07 NOTE — PROGRESS NOTES
SUBJECTIVE:   CC: Rubi is an 61 year old who presents for preventive health visit.     Patient has been advised of split billing requirements and indicates understanding: Yes  Healthy Habits:     Getting at least 3 servings of Calcium per day:  Yes    Bi-annual eye exam:  Yes    Dental care twice a year:  Yes    Sleep apnea or symptoms of sleep apnea:  None    Diet:  Diabetic    Frequency of exercise:  6-7 days/week    Duration of exercise:  45-60 minutes    PHQ-2 Total Score: 0    Additional concerns today:  Yes    Rubi is a very pleasant 61 year old who presented to the clinic for APE and some concerns.   She has been having diarrhea for 1 month. Usually happens with gluten containing foods. No cramping or abd pain. No blood in stool.     DM-2. Stopped glucose monitoring. Stopped taking glimepiride.   Blood sugars: fasting 140-165. No snacking at night.   Last A1c was 7.1%  On metformin 1000 mg bid.     She has been dizzy since yesterday. She is also nauseous and has loss of balance. Loss of balance only with episodes of dizziness. No other focal deficits. No hearing loss.     On exam she was found to have black discoloration of her tongue, upon scraping, the swab became dark and there was no other lesion on her tongue. Patient is fasting, did not have anything to eat.       Today's PHQ-2 Score:   PHQ-2 ( 1999 Pfizer) 11/7/2022   Q1: Little interest or pleasure in doing things 0   Q2: Feeling down, depressed or hopeless 0   PHQ-2 Score 0   PHQ-2 Total Score (12-17 Years)- Positive if 3 or more points; Administer PHQ-A if positive -   Q1: Little interest or pleasure in doing things Not at all   Q2: Feeling down, depressed or hopeless Not at all   PHQ-2 Score 0       Abuse: Current or Past (Physical, Sexual or Emotional) - No  Do you feel safe in your environment? Yes    Have you ever done Advance Care Planning? (For example, a Health Directive, POLST, or a discussion with a medical provider or your loved ones  about your wishes): No, advance care planning information given to patient to review.  Patient declined advance care planning discussion at this time.    Social History     Tobacco Use     Smoking status: Never     Smokeless tobacco: Never   Substance Use Topics     Alcohol use: No     Alcohol/week: 0.0 standard drinks     If you drink alcohol do you typically have >3 drinks per day or >7 drinks per week? Not applicable    Alcohol Use 11/7/2022   Prescreen: >3 drinks/day or >7 drinks/week? Not Applicable   Prescreen: >3 drinks/day or >7 drinks/week? -       Reviewed orders with patient.  Reviewed health maintenance and updated orders accordingly - Yes  Lab work is in process    Breast Cancer Screening:  Any new diagnosis of family breast, ovarian, or bowel cancer? No    FHS-7: No flowsheet data found.    Mammogram Screening: Recommended mammography every 1-2 years with patient discussion and risk factor consideration  Pertinent mammograms are reviewed under the imaging tab.    History of abnormal Pap smear: NO - age 30-65 PAP every 5 years with negative HPV co-testing recommended  PAP / HPV Latest Ref Rng & Units 6/18/2019 10/5/2015 2/15/2013   PAP (Historical) - NIL NIL ASC-US(A)   HPV16 NEG:Negative Negative Negative -   HPV18 NEG:Negative Negative Negative -   HRHPV NEG:Negative Negative Negative -     Review of Systems   Constitutional: Negative for chills and fever.   HENT: Negative for congestion, ear pain, hearing loss and sore throat.    Eyes: Negative for pain and visual disturbance.   Respiratory: Negative for cough and shortness of breath.    Cardiovascular: Negative for chest pain, palpitations and peripheral edema.   Gastrointestinal: Positive for diarrhea. Negative for abdominal pain, constipation, heartburn, hematochezia and nausea.   Breasts:  Negative for tenderness, breast mass and discharge.   Genitourinary: Negative for dysuria, frequency, hematuria, pelvic pain, urgency and vaginal bleeding.  "  Musculoskeletal: Negative for arthralgias, joint swelling and myalgias.   Skin: Negative for rash.   Neurological: Positive for dizziness. Negative for weakness, headaches and paresthesias.   Psychiatric/Behavioral: Negative for mood changes. The patient is not nervous/anxious.      OBJECTIVE:   /77 (BP Location: Right arm, Patient Position: Sitting, Cuff Size: Adult Regular)   Pulse 61   Temp 97  F (36.1  C) (Oral)   Resp 16   Ht 1.575 m (5' 2.01\")   Wt 56.3 kg (124 lb 3.2 oz)   SpO2 100%   BMI 22.71 kg/m    Physical Exam      ASSESSMENT/PLAN:   Rubi was seen today for physical.    Diagnoses and all orders for this visit:    Annual physical exam  -     CBC with Platelets (Today); Future  -     COMPREHENSIVE METABOLIC PANEL; Future  -     Lipid Profile; Future    Screening for HIV (human immunodeficiency virus)  -     HIV Antigen Antibody Combo    Cervical cancer screening  -     Pap Screen with HPV - recommended age 30 - 65 years    Type 2 diabetes mellitus without complication, without long-term current use of insulin (H)  BG readings reviewed.   Adding new medication. Discussed side effects.  Repeat A1c   -     sitagliptin (JANUVIA) 50 MG tablet; Take 1 tablet (50 mg) by mouth daily  -     Hemoglobin A1c; Future    Dizziness  -     MR Brain w/o & w Contrast; Future    Perineal abscess  Healed. Scar tissue present. No tenderness or signs of infection.    Primary hypertension  Stable. Continue medication.    Black hairy tongue  Possibly from candida or any vitamin deficiency.   -     nystatin (MYCOSTATIN) 684006 UNIT/ML suspension; Take 5 mLs (500,000 Units) by mouth 4 times daily  -     Vitamin B12; Future  -     Folate; Future    Diarrhea, unspecified type  Patient is requesting to get Celiac panel.  -     IgA [LAB73]; Future  -     Deamidated Giladin Peptide Luz IgA IgG [RKW3364]; Future  -     Tissue transglutaminase luz IgA and IgG [HIA0773]; Future  -     Endomysial Antibody IgA by IFA " "[[id number]]; Future      Patient has been advised of split billing requirements and indicates understanding: Yes      COUNSELING:  Reviewed preventive health counseling, as reflected in patient instructions       .    Estimated body mass index is 22.71 kg/m  as calculated from the following:    Height as of this encounter: 1.575 m (5' 2.01\").    Weight as of this encounter: 56.3 kg (124 lb 3.2 oz).      She reports that she has never smoked. She has never used smokeless tobacco.    STEPHEN GAYTAN MD  [facility]  "

## 2022-11-08 ENCOUNTER — TELEPHONE (OUTPATIENT)
Dept: FAMILY MEDICINE | Facility: CLINIC | Age: 62
End: 2022-11-08

## 2022-11-08 DIAGNOSIS — E11.9 TYPE 2 DIABETES MELLITUS WITHOUT COMPLICATION, WITHOUT LONG-TERM CURRENT USE OF INSULIN (H): Primary | ICD-10-CM

## 2022-11-09 ENCOUNTER — OFFICE VISIT (OUTPATIENT)
Dept: CARDIOLOGY | Facility: CLINIC | Age: 62
End: 2022-11-09
Payer: COMMERCIAL

## 2022-11-09 VITALS
HEIGHT: 63 IN | HEART RATE: 92 BPM | BODY MASS INDEX: 22.32 KG/M2 | WEIGHT: 126 LBS | SYSTOLIC BLOOD PRESSURE: 139 MMHG | DIASTOLIC BLOOD PRESSURE: 78 MMHG

## 2022-11-09 DIAGNOSIS — I10 PRIMARY HYPERTENSION: ICD-10-CM

## 2022-11-09 DIAGNOSIS — I10 ESSENTIAL HYPERTENSION WITH GOAL BLOOD PRESSURE LESS THAN 140/90: ICD-10-CM

## 2022-11-09 DIAGNOSIS — I25.10 CAD (CORONARY ARTERY DISEASE): ICD-10-CM

## 2022-11-09 DIAGNOSIS — I21.4 NSTEMI (NON-ST ELEVATED MYOCARDIAL INFARCTION) (H): ICD-10-CM

## 2022-11-09 DIAGNOSIS — I21.4 NSTEMI (NON-ST ELEVATED MYOCARDIAL INFARCTION) (H): Primary | ICD-10-CM

## 2022-11-09 DIAGNOSIS — I25.10 CORONARY ARTERY DISEASE INVOLVING NATIVE CORONARY ARTERY OF NATIVE HEART WITHOUT ANGINA PECTORIS: ICD-10-CM

## 2022-11-09 LAB — ENDOMYSIUM IGA TITR SER IF: NORMAL {TITER}

## 2022-11-09 PROCEDURE — 99214 OFFICE O/P EST MOD 30 MIN: CPT | Performed by: INTERNAL MEDICINE

## 2022-11-09 RX ORDER — ATORVASTATIN CALCIUM 40 MG/1
40 TABLET, FILM COATED ORAL DAILY
Qty: 90 TABLET | Refills: 3 | Status: SHIPPED | OUTPATIENT
Start: 2022-11-09 | End: 2024-01-02

## 2022-11-09 RX ORDER — EZETIMIBE 10 MG/1
10 TABLET ORAL DAILY
Qty: 90 TABLET | Refills: 3 | Status: SHIPPED | OUTPATIENT
Start: 2022-11-09 | End: 2023-04-21

## 2022-11-09 RX ORDER — CLOPIDOGREL BISULFATE 75 MG/1
75 TABLET ORAL DAILY
Qty: 90 TABLET | Refills: 3 | Status: SHIPPED | OUTPATIENT
Start: 2022-11-09 | End: 2023-04-21

## 2022-11-09 RX ORDER — CLOPIDOGREL BISULFATE 75 MG/1
TABLET ORAL
Qty: 94 TABLET | Refills: 3 | Status: SHIPPED | OUTPATIENT
Start: 2022-11-09 | End: 2022-11-09 | Stop reason: DRUGHIGH

## 2022-11-09 RX ORDER — CARVEDILOL 3.12 MG/1
3.12 TABLET ORAL 2 TIMES DAILY WITH MEALS
Qty: 180 TABLET | Refills: 3 | Status: SHIPPED | OUTPATIENT
Start: 2022-11-09 | End: 2023-11-08

## 2022-11-09 RX ORDER — LISINOPRIL 5 MG/1
5 TABLET ORAL DAILY
Qty: 90 TABLET | Refills: 3 | Status: SHIPPED | OUTPATIENT
Start: 2022-11-09 | End: 2024-01-02

## 2022-11-09 NOTE — LETTER
11/9/2022    STEPHEN GAYTAN MD  [address] [place] [address]    RE: Rubi Nicolas       Dear Colleague,     I had the pleasure of seeing Rubi Nicolas in the [facility].  HPI and Plan:   Ms Nicolas is a very pleasant 61-year-old female who I saw in February 2022 when she was admitted with myocardial infarction and underwent successful PCI of the mid LAD.  Subsequently she had a vasovagal event leading to bradycardia and was seen by electrophysiology and no intervention was indicated and was subsequently started on low-dose beta-blocker and did well.  Today she is coming for routine follow-up.  Echocardiogram showed normal LV function at the time of MI.  Patient tells me overall health wise she feels well.  She has been quite active in fact has lost more than 15 pounds of weight by changing her diet to vegetarian diet.  She also completed 50 miles of [place] hiking this summer.  No exertional symptoms.  She is on aspirin, Plavix, Lipitor 40 mg daily, carvedilol and lisinopril.  Remarkably lipid panel done a few days ago shows LDL 92 it was 75 several months ago.  Significant improvement in triglycerides from 158 210, HDL has increased from 58-81.  She does not use any tobacco.  She has diabetes with latest hemoglobin A1c of 7.3.    Assessment plan  A very pleasant 61-year-old female with history of CAD with history of MI in 2022 February s/p mid LAD PCI with normal LV function, hypertension, dyslipidemia, diabetes.  Overall cardiac status wise she is doing quite well without any symptoms of angina or congestive heart failure.  Cardiovascular test was benign today.  Overall blood pressure is reasonably controlled.  Her LDL is increased and I discussed option of increasing Lipitor to 80 mg daily versus adding Zetia, common side effects were discussed with patient.  She is agreeable to start Zetia, I recommend 10 mg daily of Zetia.  I recommend follow-up in about 3  months we can check lipid profile with ALT at that time and if she is doing well at that time we can probably discontinue Plavix after 1 year of dual antiplatelet therapy.    1.  CAD with history of non-STEMI in February 2022 s/p mid LAD PCI.  Clinically anginal symptoms.  Normal LV function  2.  Dyslipidemia with significant improvement in triglycerides and increase in HDL but for some reason LDL is also increased.  3.  Hypertension reasonably controlled on current medication of lisinopril and carvedilol    Recommendations  Continue aspirin, Plavix, Lipitor  Add Zetia 10 mg daily  Follow-up in 3 months with lipid profile with ALT        The level of medical decision making during this visit was of moderate complexity.      Orders Placed This Encounter   Procedures     Lipid Profile     ALT     Follow-Up with Cardiology       Orders Placed This Encounter   Medications     carvedilol (COREG) 3.125 MG tablet     Sig: Take 1 tablet (3.125 mg) by mouth 2 times daily (with meals)     Dispense:  180 tablet     Refill:  3     clopidogrel (PLAVIX) 75 MG tablet     Sig: Take 300 mg (4 tablets) as loading dose, then 75 mg daily thereafter.     Dispense:  94 tablet     Refill:  3     lisinopril (ZESTRIL) 5 MG tablet     Sig: Take 1 tablet (5 mg) by mouth daily     Dispense:  90 tablet     Refill:  3     atorvastatin (LIPITOR) 40 MG tablet     Sig: Take 1 tablet (40 mg) by mouth daily     Dispense:  90 tablet     Refill:  3     ezetimibe (ZETIA) 10 MG tablet     Sig: Take 1 tablet (10 mg) by mouth daily     Dispense:  90 tablet     Refill:  3       Medications Discontinued During This Encounter   Medication Reason     clopidogrel (PLAVIX) 75 MG tablet Reorder     carvedilol (COREG) 3.125 MG tablet Reorder     lisinopril (ZESTRIL) 5 MG tablet Reorder     atorvastatin (LIPITOR) 40 MG tablet Reorder         Encounter Diagnoses   Name Primary?     NSTEMI (non-ST elevated myocardial infarction) (H)      Primary hypertension       Coronary artery disease involving native coronary artery of native heart without angina pectoris      Essential hypertension with goal blood pressure less than 140/90        CURRENT MEDICATIONS:  Current Outpatient Medications   Medication Sig Dispense Refill     aspirin (ASA) 81 MG EC tablet Take 1 tablet (81 mg) by mouth daily 90 tablet 3     atorvastatin (LIPITOR) 40 MG tablet Take 1 tablet (40 mg) by mouth daily 90 tablet 3     blood glucose (CONTOUR NEXT TEST) test strip Use to test blood sugar 1 times daily or as directed. 100 strip 4     carvedilol (COREG) 3.125 MG tablet Take 1 tablet (3.125 mg) by mouth 2 times daily (with meals) 180 tablet 3     clopidogrel (PLAVIX) 75 MG tablet Take 300 mg (4 tablets) as loading dose, then 75 mg daily thereafter. 94 tablet 3     Coenzyme Q10 (CO Q 10 PO)        ezetimibe (ZETIA) 10 MG tablet Take 1 tablet (10 mg) by mouth daily 90 tablet 3     lisinopril (ZESTRIL) 5 MG tablet Take 1 tablet (5 mg) by mouth daily 90 tablet 3     metFORMIN (GLUCOPHAGE) 1000 MG tablet TAKE 1 TABLET BY MOUTH TWICE A DAY WITH MEALS 180 tablet 2     Microlet Lancets MISC 1 lancet daily 100 each 4     Multiple Vitamin (MULTIVITAMIN OR) Take 1 tablet by mouth daily.       nitroGLYcerin (NITROSTAT) 0.4 MG sublingual tablet For chest pain place 1 tablet under the tongue every 5 minutes for 3 doses. If symptoms persist 5 minutes after 1st dose call 911. 25 tablet 11     nystatin (MYCOSTATIN) 620672 UNIT/ML suspension Take 5 mLs (500,000 Units) by mouth 4 times daily 400 mL 1     omeprazole (PRILOSEC) 20 MG DR capsule Take 20 mg by mouth twice a week Monday, Thursday       sitagliptin (JANUVIA) 50 MG tablet Take 1 tablet (50 mg) by mouth daily 90 tablet 0     Continuous Blood Gluc  (FREESTYLE ADRIANA 14 DAY READER) ROSALIE USE 1 DEVICE CONTINUOUSLY (Patient not taking: Reported on 11/9/2022) 1 each 0     Continuous Blood Gluc Sensor (FREESTYLE ADRIANA 14 DAY SENSOR) [facility] USE ONE EVERY 14 DAYS  (Patient not taking: Reported on 11/7/2022) 2 each 11     glimepiride (AMARYL) 2 MG tablet TAKE 3 TABLETS (6 MG) BY MOUTH EVERY MORNING (BEFORE BREAKFAST) (Patient not taking: Reported on 11/7/2022) 270 tablet 0       ALLERGIES     Allergies   Allergen Reactions     Keflex [Cephalexin Hcl] Hives     Penicillins Hives     Ragweeds      Sneezing etc.       PAST MEDICAL HISTORY:  Past Medical History:   Diagnosis Date     Atypical squamous cells of undetermined significance (ASCUS) on Papanicolaou smear of cervix 10/9/2015     GERD (gastroesophageal reflux disease)      HTN (hypertension)      Hyperlipidemia      Type 2 diabetes mellitus without complications (H) 10/22/2015       PAST SURGICAL HISTORY:  Past Surgical History:   Procedure Laterality Date     APPENDECTOMY  1976    at time of USO     ARTHROSCOPY KNEE RT/LT       COLONOSCOPY N/A 11/9/2015    Procedure: COLONOSCOPY;  Surgeon: Kate Redmond MD;  Location:  GI     COLONOSCOPY N/A 2/14/2022    Procedure: COLONOSCOPY;  Surgeon: Kate Redmond MD;  Location:  GI     CV HEART CATHETERIZATION WITH POSSIBLE INTERVENTION N/A 2/28/2022    Procedure: Heart Catheterization with Possible Intervention;  Surgeon: Jh Powell MD;  Location:  HEART CARDIAC CATH LAB     [facility] LAP OVARIAN CYSTOTOMY  1976    salpingooopherectomy for large dermoid       FAMILY HISTORY:  Family History   Problem Relation Age of Onset     Diabetes Mother 60        type 2     Hypertension Mother      Dementia Mother 90     Hypertension Father      Cerebrovascular Disease Father      Cancer Father 68        brain ca     Diabetes Maternal Uncle         type 1       SOCIAL HISTORY:  Social History     Socioeconomic History     Marital status:      Spouse name: None     Number of children: None     Years of education: None     Highest education level: None   Tobacco Use     Smoking status: Never     Smokeless tobacco: Never   Substance and Sexual Activity      "Alcohol use: No     Alcohol/week: 0.0 standard drinks     Drug use: No     Sexual activity: Yes     Partners: Male     Birth control/protection: Post-menopausal, None   Other Topics Concern     Parent/sibling w/ CABG, MI or angioplasty before 65F 55M? 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    ETOH- none Exercise-  1 hr x 5 /week- Walks brisk, [place]       Review of Systems:  Skin:          Eyes:         ENT:         Respiratory:  Negative       Cardiovascular:  Negative;palpitations;chest pain;dizziness;syncope or near-syncope;cyanosis;edema;exercise intolerance Positive for energy level slightly decreased  Gastroenterology:        Genitourinary:         Musculoskeletal:         Neurologic:         Psychiatric:         Heme/Lymph/Imm:         Endocrine:  Positive for diabetes      Physical Exam:  Vitals: /78   Pulse 92   Ht 1.588 m (5' 2.5\")   Wt 57.2 kg (126 lb)   BMI 22.68 kg/m      General patient appears comfortable  Neck normal JVP  Cardiovascular system S1-S2 normal no murmur rub or gallop  Respiratory system clear to auscultation  Extremities no edema  Neurological alert, oriented    CC  Referred Self,     Thank you for allowing me to participate in the care of your patient.      Sincerely,     Jordy Carmona MD     [facility]  "

## 2022-11-09 NOTE — PROGRESS NOTES
HPI and Plan:   Ms Nicolas is a very pleasant 61-year-old female who I saw in February 2022 when she was admitted with myocardial infarction and underwent successful PCI of the mid LAD.  Subsequently she had a vasovagal event leading to bradycardia and was seen by electrophysiology and no intervention was indicated and was subsequently started on low-dose beta-blocker and did well.  Today she is coming for routine follow-up.  Echocardiogram showed normal LV function at the time of MI.  Patient tells me overall health wise she feels well.  She has been quite active in fact has lost more than 15 pounds of weight by changing her diet to vegetarian diet.  She also completed 50 miles of [place] this summer.  No exertional symptoms.  She is on aspirin, Plavix, Lipitor 40 mg daily, carvedilol and lisinopril.  Remarkably lipid panel done a few days ago shows LDL 92 it was 75 several months ago.  Significant improvement in triglycerides from 158 210, HDL has increased from 58-81.  She does not use any tobacco.  She has diabetes with latest hemoglobin A1c of 7.3.    Assessment plan  A very pleasant 61-year-old female with history of CAD with history of MI in 2022 February s/p mid LAD PCI with normal LV function, hypertension, dyslipidemia, diabetes.  Overall cardiac status wise she is doing quite well without any symptoms of angina or congestive heart failure.  Cardiovascular test was benign today.  Overall blood pressure is reasonably controlled.  Her LDL is increased and I discussed option of increasing Lipitor to 80 mg daily versus adding Zetia, common side effects were discussed with patient.  She is agreeable to start Zetia, I recommend 10 mg daily of Zetia.  I recommend follow-up in about 3 months we can check lipid profile with ALT at that time and if she is doing well at that time we can probably discontinue Plavix after 1 year of dual antiplatelet therapy.    1.  CAD with history of non-STEMI in  February 2022 s/p mid LAD PCI.  Clinically anginal symptoms.  Normal LV function  2.  Dyslipidemia with significant improvement in triglycerides and increase in HDL but for some reason LDL is also increased.  3.  Hypertension reasonably controlled on current medication of lisinopril and carvedilol    Recommendations  Continue aspirin, Plavix, Lipitor  Add Zetia 10 mg daily  Follow-up in 3 months with lipid profile with ALT        The level of medical decision making during this visit was of moderate complexity.      Orders Placed This Encounter   Procedures     Lipid Profile     ALT     Follow-Up with Cardiology       Orders Placed This Encounter   Medications     carvedilol (COREG) 3.125 MG tablet     Sig: Take 1 tablet (3.125 mg) by mouth 2 times daily (with meals)     Dispense:  180 tablet     Refill:  3     clopidogrel (PLAVIX) 75 MG tablet     Sig: Take 300 mg (4 tablets) as loading dose, then 75 mg daily thereafter.     Dispense:  94 tablet     Refill:  3     lisinopril (ZESTRIL) 5 MG tablet     Sig: Take 1 tablet (5 mg) by mouth daily     Dispense:  90 tablet     Refill:  3     atorvastatin (LIPITOR) 40 MG tablet     Sig: Take 1 tablet (40 mg) by mouth daily     Dispense:  90 tablet     Refill:  3     ezetimibe (ZETIA) 10 MG tablet     Sig: Take 1 tablet (10 mg) by mouth daily     Dispense:  90 tablet     Refill:  3       Medications Discontinued During This Encounter   Medication Reason     clopidogrel (PLAVIX) 75 MG tablet Reorder     carvedilol (COREG) 3.125 MG tablet Reorder     lisinopril (ZESTRIL) 5 MG tablet Reorder     atorvastatin (LIPITOR) 40 MG tablet Reorder         Encounter Diagnoses   Name Primary?     NSTEMI (non-ST elevated myocardial infarction) (H)      Primary hypertension      Coronary artery disease involving native coronary artery of native heart without angina pectoris      Essential hypertension with goal blood pressure less than 140/90        CURRENT MEDICATIONS:  Current Outpatient  Medications   Medication Sig Dispense Refill     aspirin (ASA) 81 MG EC tablet Take 1 tablet (81 mg) by mouth daily 90 tablet 3     atorvastatin (LIPITOR) 40 MG tablet Take 1 tablet (40 mg) by mouth daily 90 tablet 3     blood glucose (CONTOUR NEXT TEST) test strip Use to test blood sugar 1 times daily or as directed. 100 strip 4     carvedilol (COREG) 3.125 MG tablet Take 1 tablet (3.125 mg) by mouth 2 times daily (with meals) 180 tablet 3     clopidogrel (PLAVIX) 75 MG tablet Take 300 mg (4 tablets) as loading dose, then 75 mg daily thereafter. 94 tablet 3     Coenzyme Q10 (CO Q 10 PO)        ezetimibe (ZETIA) 10 MG tablet Take 1 tablet (10 mg) by mouth daily 90 tablet 3     lisinopril (ZESTRIL) 5 MG tablet Take 1 tablet (5 mg) by mouth daily 90 tablet 3     metFORMIN (GLUCOPHAGE) 1000 MG tablet TAKE 1 TABLET BY MOUTH TWICE A DAY WITH MEALS 180 tablet 2     Microlet Lancets MISC 1 lancet daily 100 each 4     Multiple Vitamin (MULTIVITAMIN OR) Take 1 tablet by mouth daily.       nitroGLYcerin (NITROSTAT) 0.4 MG sublingual tablet For chest pain place 1 tablet under the tongue every 5 minutes for 3 doses. If symptoms persist 5 minutes after 1st dose call 911. 25 tablet 11     nystatin (MYCOSTATIN) 804217 UNIT/ML suspension Take 5 mLs (500,000 Units) by mouth 4 times daily 400 mL 1     omeprazole (PRILOSEC) 20 MG DR capsule Take 20 mg by mouth twice a week Monday, Thursday       sitagliptin (JANUVIA) 50 MG tablet Take 1 tablet (50 mg) by mouth daily 90 tablet 0     Continuous Blood Gluc  (FREESTYLE ADRIANA 14 DAY READER) [facility]IE USE 1 DEVICE CONTINUOUSLY (Patient not taking: Reported on 11/9/2022) 1 each 0     Continuous Blood Gluc Sensor (FREESTYLE ADRIANA 14 DAY SENSOR) [facility] USE ONE EVERY 14 DAYS (Patient not taking: Reported on 11/7/2022) 2 each 11     glimepiride (AMARYL) 2 MG tablet TAKE 3 TABLETS (6 MG) BY MOUTH EVERY MORNING (BEFORE BREAKFAST) (Patient not taking: Reported on 11/7/2022) 270 tablet 0        ALLERGIES     Allergies   Allergen Reactions     Keflex [Cephalexin Hcl] Hives     Penicillins Hives     Ragweeds      Sneezing etc.       PAST MEDICAL HISTORY:  Past Medical History:   Diagnosis Date     Atypical squamous cells of undetermined significance (ASCUS) on Papanicolaou smear of cervix 10/9/2015     GERD (gastroesophageal reflux disease)      HTN (hypertension)      Hyperlipidemia      Type 2 diabetes mellitus without complications (H) 10/22/2015       PAST SURGICAL HISTORY:  Past Surgical History:   Procedure Laterality Date     APPENDECTOMY  1976    at time of USO     ARTHROSCOPY KNEE RT/LT       COLONOSCOPY N/A 11/9/2015    Procedure: COLONOSCOPY;  Surgeon: Kate Redmond MD;  Location:  GI     COLONOSCOPY N/A 2/14/2022    Procedure: COLONOSCOPY;  Surgeon: Kate Redmond MD;  Location:  GI     CV HEART CATHETERIZATION WITH POSSIBLE INTERVENTION N/A 2/28/2022    Procedure: Heart Catheterization with Possible Intervention;  Surgeon: Jh Powell MD;  Location:  HEART CARDIAC CATH LAB     [facility] LAP OVARIAN CYSTOTOMY  1976    salpingooopherectomy for large dermoid       FAMILY HISTORY:  Family History   Problem Relation Age of Onset     Diabetes Mother 60        type 2     Hypertension Mother      Dementia Mother 90     Hypertension Father      Cerebrovascular Disease Father      Cancer Father 68        brain ca     Diabetes Maternal Uncle         type 1       SOCIAL HISTORY:  Social History     Socioeconomic History     Marital status:      Spouse name: None     Number of children: None     Years of education: None     Highest education level: None   Tobacco Use     Smoking status: Never     Smokeless tobacco: Never   Substance and Sexual Activity     Alcohol use: No     Alcohol/week: 0.0 standard drinks     Drug use: No     Sexual activity: Yes     Partners: Male     Birth control/protection: Post-menopausal, None   Other Topics Concern     Parent/sibling w/ CABG,  "MI or angioplasty before 65F 55M? 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    ETOH- none Exercise-  1 hr x 5 /week- Walks brisk, [place] man       Review of Systems:  Skin:          Eyes:         ENT:         Respiratory:  Negative       Cardiovascular:  Negative;palpitations;chest pain;dizziness;syncope or near-syncope;cyanosis;edema;exercise intolerance Positive for energy level slightly decreased  Gastroenterology:        Genitourinary:         Musculoskeletal:         Neurologic:         Psychiatric:         Heme/Lymph/Imm:         Endocrine:  Positive for diabetes      Physical Exam:  Vitals: /78   Pulse 92   Ht 1.588 m (5' 2.5\")   Wt 57.2 kg (126 lb)   BMI 22.68 kg/m      General patient appears comfortable  Neck normal JVP  Cardiovascular system S1-S2 normal no murmur rub or gallop  Respiratory system clear to auscultation  Extremities no edema  Neurological alert, oriented    CC  Referred Self, MD  No address on file              "

## 2022-11-10 LAB
BKR LAB AP GYN ADEQUACY: NORMAL
BKR LAB AP GYN INTERPRETATION: NORMAL
BKR LAB AP HPV REFLEX: NORMAL
BKR LAB AP PREVIOUS ABNORMAL: NORMAL
GLIADIN IGA SER-ACNC: 4 U/ML
GLIADIN IGG SER-ACNC: <0.6 U/ML
PATH REPORT.COMMENTS IMP SPEC: NORMAL
PATH REPORT.COMMENTS IMP SPEC: NORMAL
PATH REPORT.RELEVANT HX SPEC: NORMAL
TTG IGA SER-ACNC: 1.6 U/ML
TTG IGG SER-ACNC: <0.6 U/ML

## 2022-11-11 LAB
HUMAN PAPILLOMA VIRUS 16 DNA: NEGATIVE
HUMAN PAPILLOMA VIRUS 18 DNA: NEGATIVE
HUMAN PAPILLOMA VIRUS FINAL DIAGNOSIS: NORMAL
HUMAN PAPILLOMA VIRUS OTHER HR: NEGATIVE

## 2022-11-14 ENCOUNTER — TELEPHONE (OUTPATIENT)
Dept: FAMILY MEDICINE | Facility: CLINIC | Age: 62
End: 2022-11-14

## 2022-11-14 NOTE — TELEPHONE ENCOUNTER
Reason for Call:  Appointment Request    Patient requesting this type of appt: Chronic Disease Management/Medication/Follow-Up    Requested provider: Carmina Bernal    Reason patient unable to be scheduled: Not within requested timeframe    When does patient want to be seen/preferred time: 1 month (mid December)    Comments: Per pt Dr Bernal told her to follow up mid December as she started a new diabetes medicine, no openings until February, looking to see if team can get her worked in on time for mid December?    Could we send this information to you [place] or would you prefer to receive a phone call?:   Patient would prefer a phone call   Okay to leave a detailed message?: Yes at Home number on file [phone] (home)    Call taken on 11/14/2022 at 11:08 AM by Taya Lawson

## 2022-11-16 NOTE — TELEPHONE ENCOUNTER
Patient call stating the same purpose regarding seeing her PCP. Please call patient if she could be squeeze in PCP schedule. Or virtual appointment be an option for phone call or video. Otherwise, please call and advise patient. Thank  You.

## 2022-11-17 ENCOUNTER — HOSPITAL ENCOUNTER (OUTPATIENT)
Dept: MRI IMAGING | Facility: CLINIC | Age: 62
Discharge: HOME | End: 2022-11-17
Attending: INTERNAL MEDICINE | Admitting: INTERNAL MEDICINE
Payer: COMMERCIAL

## 2022-11-17 DIAGNOSIS — R42 DIZZINESS: ICD-10-CM

## 2022-11-17 PROCEDURE — A9585 GADOBUTROL INJECTION: HCPCS | Performed by: INTERNAL MEDICINE

## 2022-11-17 PROCEDURE — 255N000002 HC RX 255 OP 636: Performed by: INTERNAL MEDICINE

## 2022-11-17 PROCEDURE — 70543 MRI ORBT/FAC/NCK W/O &W/DYE: CPT

## 2022-11-17 RX ORDER — GADOBUTROL 604.72 MG/ML
6 INJECTION INTRAVENOUS ONCE
Status: COMPLETED | OUTPATIENT
Start: 2022-11-17 | End: 2022-11-17

## 2022-11-17 RX ADMIN — GADOBUTROL 6 ML: 604.72 INJECTION INTRAVENOUS at 13:32

## 2022-12-01 DIAGNOSIS — E11.9 TYPE 2 DIABETES MELLITUS WITHOUT COMPLICATION, WITHOUT LONG-TERM CURRENT USE OF INSULIN (H): ICD-10-CM

## 2022-12-05 RX ORDER — SITAGLIPTIN 50 MG/1
TABLET, FILM COATED ORAL
Qty: 90 TABLET | Refills: 0 | Status: SHIPPED | OUTPATIENT
Start: 2022-12-05 | End: 2023-04-17

## 2022-12-05 NOTE — TELEPHONE ENCOUNTER
Routing refill request to provider for review/approval because:  Labs out of range:    Creatinine   Date Value Ref Range Status   11/07/2022 0.47 (L) 0.52 - 1.04 mg/dL Final   12/01/2020 0.69 0.52 - 1.04 mg/dL Final     Elsie LI RN  [facility]

## 2023-01-26 ENCOUNTER — HOSPITAL ENCOUNTER (OUTPATIENT)
Dept: MAMMOGRAPHY | Facility: CLINIC | Age: 63
Discharge: HOME | End: 2023-01-26
Attending: INTERNAL MEDICINE | Admitting: INTERNAL MEDICINE
Payer: COMMERCIAL

## 2023-01-26 DIAGNOSIS — Z12.31 VISIT FOR SCREENING MAMMOGRAM: ICD-10-CM

## 2023-01-26 PROCEDURE — 77067 SCR MAMMO BI INCL CAD: CPT

## 2023-02-01 DIAGNOSIS — E11.9 TYPE 2 DIABETES MELLITUS WITHOUT COMPLICATION, WITHOUT LONG-TERM CURRENT USE OF INSULIN (H): ICD-10-CM

## 2023-02-17 ENCOUNTER — LAB (OUTPATIENT)
Dept: LAB | Facility: CLINIC | Age: 63
End: 2023-02-17
Payer: COMMERCIAL

## 2023-02-17 DIAGNOSIS — I25.10 CORONARY ARTERY DISEASE INVOLVING NATIVE CORONARY ARTERY OF NATIVE HEART WITHOUT ANGINA PECTORIS: ICD-10-CM

## 2023-02-17 LAB
ALT SERPL W P-5'-P-CCNC: 25 U/L (ref 10–35)
CHOLEST SERPL-MCNC: 148 MG/DL
HDLC SERPL-MCNC: 69 MG/DL
LDLC SERPL CALC-MCNC: 64 MG/DL
NONHDLC SERPL-MCNC: 79 MG/DL
TRIGL SERPL-MCNC: 74 MG/DL

## 2023-02-17 PROCEDURE — 36415 COLL VENOUS BLD VENIPUNCTURE: CPT

## 2023-02-17 PROCEDURE — 80061 LIPID PANEL: CPT

## 2023-02-17 PROCEDURE — 84460 ALANINE AMINO (ALT) (SGPT): CPT

## 2023-02-21 ENCOUNTER — TRANSFERRED RECORDS (OUTPATIENT)
Dept: MULTI SPECIALTY CLINIC | Facility: CLINIC | Age: 63
End: 2023-02-21

## 2023-02-21 LAB — RETINOPATHY: NEGATIVE

## 2023-04-21 ENCOUNTER — OFFICE VISIT (OUTPATIENT)
Dept: CARDIOLOGY | Facility: CLINIC | Age: 63
End: 2023-04-21
Attending: INTERNAL MEDICINE
Payer: COMMERCIAL

## 2023-04-21 VITALS
WEIGHT: 134.2 LBS | DIASTOLIC BLOOD PRESSURE: 72 MMHG | HEART RATE: 69 BPM | SYSTOLIC BLOOD PRESSURE: 116 MMHG | BODY MASS INDEX: 23.78 KG/M2 | HEIGHT: 63 IN

## 2023-04-21 DIAGNOSIS — I25.10 CORONARY ARTERY DISEASE INVOLVING NATIVE CORONARY ARTERY OF NATIVE HEART WITHOUT ANGINA PECTORIS: ICD-10-CM

## 2023-04-21 PROCEDURE — 99214 OFFICE O/P EST MOD 30 MIN: CPT | Performed by: INTERNAL MEDICINE

## 2023-04-21 RX ORDER — EZETIMIBE 10 MG/1
10 TABLET ORAL DAILY
Qty: 90 TABLET | Refills: 3 | Status: SHIPPED | OUTPATIENT
Start: 2023-04-21 | End: 2024-04-26

## 2023-04-21 NOTE — LETTER
4/21/2023    STEPHEN GAYTAN MD  [address] [place] [address]    RE: Rubi Nicolas       Dear Colleague,     I had the pleasure of seeing Rubi Nicolas in the [facility].  HPI and Plan:   Ms Nicolas is a very pleasant 62-year-old female who I saw in February 2022 when she was admitted with myocardial infarction and underwent successful PCI of the mid LAD.  Subsequently she had a vasovagal event leading to bradycardia and was seen by electrophysiology and no intervention was indicated and was subsequently started on low-dose beta-blocker and did well.  Today she is coming for routine follow-up.  Echocardiogram showed normal LV function at the time of MI.  She has done well from cardiac standpoint since that time.  A few months ago because of elevated LDL Zetia was added.  Repeat lipid panel shows LDL well controlled at 64.  ALT is normal.  She is fairly active and every [place] least 50 miles over the course of 4 to 5 days.  She did it last year or 2 after the MI.  No exertional symptoms or chest discomfort shortness of breath dizziness presyncope or syncope.  She is on baby aspirin, Plavix, carvedilol 3.125 mg twice daily, lisinopril 5 mg daily, Zetia 10 mg daily.  She also has diabetes and is on glimepiride.    Assessment and plan  1.  CAD with history of MI in February 2022 s/p mid LAD PCI.  No significant coronary disease elsewhere.  Clinically doing well without any anginal symptoms.  2.  LDL well controlled on Zetia and Lipitor  3.  Hypertension well-controlled on lisinopril and carvedilol    Recommendations  Can discontinue Plavix as more than 1 year since MI.  Continue aspirin, Lipitor, Zetia, carvedilol, lisinopril  Follow-up in a year, sooner if she notes any change in clinical status.    Today's clinic visit entailed:  Review of the result(s) of each unique test - lipid panel          Orders Placed This Encounter   Procedures    Lipid  Profile    ALT    Follow-Up with Cardiology       Orders Placed This Encounter   Medications    ezetimibe (ZETIA) 10 MG tablet     Sig: Take 1 tablet (10 mg) by mouth daily     Dispense:  90 tablet     Refill:  3       Medications Discontinued During This Encounter   Medication Reason    ezetimibe (ZETIA) 10 MG tablet Reorder (No AVS / No eCancel)    clopidogrel (PLAVIX) 75 MG tablet          Encounter Diagnosis   Name Primary?    Coronary artery disease involving native coronary artery of native heart without angina pectoris        CURRENT MEDICATIONS:  Current Outpatient Medications   Medication Sig Dispense Refill    aspirin (ASA) 81 MG EC tablet Take 1 tablet (81 mg) by mouth daily 90 tablet 3    atorvastatin (LIPITOR) 40 MG tablet Take 1 tablet (40 mg) by mouth daily 90 tablet 3    carvedilol (COREG) 3.125 MG tablet Take 1 tablet (3.125 mg) by mouth 2 times daily (with meals) 180 tablet 3    Coenzyme Q10 (CO Q 10 PO)       Continuous Blood Gluc  (FREESTYLE ADRIANA 14 DAY READER) ROSALIE USE 1 DEVICE CONTINUOUSLY 1 each 0    Continuous Blood Gluc Sensor (FREESTYLE ADRIANA 14 DAY SENSOR) [facility] USE ONE EVERY 14 DAYS 2 each 11    ezetimibe (ZETIA) 10 MG tablet Take 1 tablet (10 mg) by mouth daily 90 tablet 3    JANUVIA 50 MG tablet TAKE 1 TABLET BY MOUTH EVERY DAY 90 tablet 1    lisinopril (ZESTRIL) 5 MG tablet Take 1 tablet (5 mg) by mouth daily 90 tablet 3    metFORMIN (GLUCOPHAGE) 1000 MG tablet TAKE 1 TABLET BY MOUTH TWICE A DAY WITH MEALS 180 tablet 2    Microlet Lancets MISC 1 lancet daily 100 each 4    Multiple Vitamin (MULTIVITAMIN OR) Take 1 tablet by mouth daily.      nitroGLYcerin (NITROSTAT) 0.4 MG sublingual tablet For chest pain place 1 tablet under the tongue every 5 minutes for 3 doses. If symptoms persist 5 minutes after 1st dose call 911. 25 tablet 11    omeprazole (PRILOSEC) 20 MG DR capsule Take 20 mg by mouth twice a week Monday, Thursday      glimepiride (AMARYL) 2 MG tablet TAKE 3 TABLETS (6  MG) BY MOUTH EVERY MORNING (BEFORE BREAKFAST) (Patient not taking: Reported on 11/7/2022) 270 tablet 0    nystatin (MYCOSTATIN) 123598 UNIT/ML suspension Take 5 mLs (500,000 Units) by mouth 4 times daily (Patient not taking: Reported on 4/21/2023) 400 mL 1       ALLERGIES     Allergies   Allergen Reactions    Keflex [Cephalexin Hcl] Hives    Penicillins Hives    Ragweeds      Sneezing etc.       PAST MEDICAL HISTORY:  Past Medical History:   Diagnosis Date    Atypical squamous cells of undetermined significance (ASCUS) on Papanicolaou smear of cervix 10/9/2015    GERD (gastroesophageal reflux disease)     HTN (hypertension)     Hyperlipidemia     Type 2 diabetes mellitus without complications (H) 10/22/2015       PAST SURGICAL HISTORY:  Past Surgical History:   Procedure Laterality Date    APPENDECTOMY  1976    at time of USO    ARTHROSCOPY KNEE RT/LT      COLONOSCOPY N/A 11/9/2015    Procedure: COLONOSCOPY;  Surgeon: Kate Redmond MD;  Location:  GI    COLONOSCOPY N/A 2/14/2022    Procedure: COLONOSCOPY;  Surgeon: Kate Redmond MD;  Location:  GI    CV HEART CATHETERIZATION WITH POSSIBLE INTERVENTION N/A 2/28/2022    Procedure: Heart Catheterization with Possible Intervention;  Surgeon: Jh Powell MD;  Location:  HEART CARDIAC CATH LAB    [facility] LAP OVARIAN CYSTOTOMY  1976    salpingooopherectomy for large dermoid       FAMILY HISTORY:  Family History   Problem Relation Age of Onset    Diabetes Mother 60        type 2    Hypertension Mother     Dementia Mother 90    Hypertension Father     Cerebrovascular Disease Father     Cancer Father 68        brain ca    Diabetes Maternal Uncle         type 1       SOCIAL HISTORY:  Social History     Socioeconomic History    Marital status:      Spouse name: None    Number of children: None    Years of education: None    Highest education level: None   Tobacco Use    Smoking status: Never    Smokeless tobacco: Never   Substance and Sexual  "Activity    Alcohol use: No     Alcohol/week: 0.0 standard drinks of alcohol    Drug use: No    Sexual activity: Yes     Partners: Male     Birth control/protection: Post-menopausal, None   Other Topics Concern    Parent/sibling w/ CABG, MI or angioplasty before 65F 55M? 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    ETOH- none Exercise-  1 hr x 5 /week- Walks brisk, CC sk       Review of Systems:  Skin:          Eyes:         ENT:         Respiratory:  Negative       Cardiovascular:  Negative;palpitations;chest pain;dizziness;syncope or near-syncope;cyanosis;lightheadedness;exercise intolerance Gastroenterology:        Genitourinary:         Musculoskeletal:         Neurologic:         Psychiatric:         Heme/Lymph/Imm:         Endocrine:  Positive for diabetes      Physical Exam:  Vitals: /72   Pulse 69   Ht 1.588 m (5' 2.5\")   Wt 60.9 kg (134 lb 3.2 oz)   BMI 24.15 kg/m      General patient appears comfortable  Neck normal JVP  Cardiovascular system S1-S2 normal no murmur rub or gallop  Respiratory system clear to auscultation  Extremities no edema  Neurological alert, oriented    CC  Jordy Carmona MD  [address]  [place] [address]      Thank you for allowing me to participate in the care of your patient.      Sincerely,     Jordy Carmona MD     [facility]  "

## 2023-04-21 NOTE — PROGRESS NOTES
HPI and Plan:   Ms Nicolas is a very pleasant 62-year-old female who I saw in February 2022 when she was admitted with myocardial infarction and underwent successful PCI of the mid LAD.  Subsequently she had a vasovagal event leading to bradycardia and was seen by electrophysiology and no intervention was indicated and was subsequently started on low-dose beta-blocker and did well.  Today she is coming for routine follow-up.  Echocardiogram showed normal LV function at the time of MI.  She has done well from cardiac standpoint since that time.  A few months ago because of elevated LDL Zetia was added.  Repeat lipid panel shows LDL well controlled at 64.  ALT is normal.  She is fairly active and every summer h[place] [employer] [place] at least 50 miles over the course of 4 to 5 days.  She did it last year or 2 after the MI.  No exertional symptoms or chest discomfort shortness of breath dizziness presyncope or syncope.  She is on baby aspirin, Plavix, carvedilol 3.125 mg twice daily, lisinopril 5 mg daily, Zetia 10 mg daily.  She also has diabetes and is on glimepiride.    Assessment and plan  1.  CAD with history of MI in February 2022 s/p mid LAD PCI.  No significant coronary disease elsewhere.  Clinically doing well without any anginal symptoms.  2.  LDL well controlled on Zetia and Lipitor  3.  Hypertension well-controlled on lisinopril and carvedilol    Recommendations  Can discontinue Plavix as more than 1 year since MI.  Continue aspirin, Lipitor, Zetia, carvedilol, lisinopril  Follow-up in a year, sooner if she notes any change in clinical status.    Today's clinic visit entailed:  Review of the result(s) of each unique test - lipid panel          Orders Placed This Encounter   Procedures     Lipid Profile     ALT     Follow-Up with Cardiology       Orders Placed This Encounter   Medications     ezetimibe (ZETIA) 10 MG tablet     Sig: Take 1 tablet (10 mg) by mouth daily     Dispense:  90 tablet     Refill:  3        Medications Discontinued During This Encounter   Medication Reason     ezetimibe (ZETIA) 10 MG tablet Reorder (No AVS / No eCancel)     clopidogrel (PLAVIX) 75 MG tablet          Encounter Diagnosis   Name Primary?     Coronary artery disease involving native coronary artery of native heart without angina pectoris        CURRENT MEDICATIONS:  Current Outpatient Medications   Medication Sig Dispense Refill     aspirin (ASA) 81 MG EC tablet Take 1 tablet (81 mg) by mouth daily 90 tablet 3     atorvastatin (LIPITOR) 40 MG tablet Take 1 tablet (40 mg) by mouth daily 90 tablet 3     carvedilol (COREG) 3.125 MG tablet Take 1 tablet (3.125 mg) by mouth 2 times daily (with meals) 180 tablet 3     Coenzyme Q10 (CO Q 10 PO)        Continuous Blood Gluc  (FREESTIntrinsiq Materials ADRIANA 14 DAY READER) [facility] USE 1 DEVICE CONTINUOUSLY 1 each 0     Continuous Blood Gluc Sensor (FREESTYLE ADRIANA 14 DAY SENSOR) [facility] USE ONE EVERY 14 DAYS 2 each 11     ezetimibe (ZETIA) 10 MG tablet Take 1 tablet (10 mg) by mouth daily 90 tablet 3     JANUVIA 50 MG tablet TAKE 1 TABLET BY MOUTH EVERY DAY 90 tablet 1     lisinopril (ZESTRIL) 5 MG tablet Take 1 tablet (5 mg) by mouth daily 90 tablet 3     metFORMIN (GLUCOPHAGE) 1000 MG tablet TAKE 1 TABLET BY MOUTH TWICE A DAY WITH MEALS 180 tablet 2     Microlet Lancets MISC 1 lancet daily 100 each 4     Multiple Vitamin (MULTIVITAMIN OR) Take 1 tablet by mouth daily.       nitroGLYcerin (NITROSTAT) 0.4 MG sublingual tablet For chest pain place 1 tablet under the tongue every 5 minutes for 3 doses. If symptoms persist 5 minutes after 1st dose call 911. 25 tablet 11     omeprazole (PRILOSEC) 20 MG DR capsule Take 20 mg by mouth twice a week Monday, Thursday       glimepiride (AMARYL) 2 MG tablet TAKE 3 TABLETS (6 MG) BY MOUTH EVERY MORNING (BEFORE BREAKFAST) (Patient not taking: Reported on 11/7/2022) 270 tablet 0     nystatin (MYCOSTATIN) [id number] UNIT/ML suspension Take 5 mLs (500,000 Units) by mouth 4  times daily (Patient not taking: Reported on 4/21/2023) 400 mL 1       ALLERGIES     Allergies   Allergen Reactions     Keflex [Cephalexin Hcl] Hives     Penicillins Hives     Ragweeds      Sneezing etc.       PAST MEDICAL HISTORY:  Past Medical History:   Diagnosis Date     Atypical squamous cells of undetermined significance (ASCUS) on Papanicolaou smear of cervix 10/9/2015     GERD (gastroesophageal reflux disease)      HTN (hypertension)      Hyperlipidemia      Type 2 diabetes mellitus without complications (H) 10/22/2015       PAST SURGICAL HISTORY:  Past Surgical History:   Procedure Laterality Date     APPENDECTOMY  1976    at time of USO     ARTHROSCOPY KNEE RT/LT       COLONOSCOPY N/A 11/9/2015    Procedure: COLONOSCOPY;  Surgeon: Kate Redmond MD;  Location:  GI     COLONOSCOPY N/A 2/14/2022    Procedure: COLONOSCOPY;  Surgeon: Kate Redmond MD;  Location:  GI     CV HEART CATHETERIZATION WITH POSSIBLE INTERVENTION N/A 2/28/2022    Procedure: Heart Catheterization with Possible Intervention;  Surgeon: Jh Powell MD;  Location:  HEART CARDIAC CATH LAB     [facility] LAP OVARIAN CYSTOTOMY  1976    salpingooopherectomy for large dermoid       FAMILY HISTORY:  Family History   Problem Relation Age of Onset     Diabetes Mother 60        type 2     Hypertension Mother      Dementia Mother 90     Hypertension Father      Cerebrovascular Disease Father      Cancer Father 68        brain ca     Diabetes Maternal Uncle         type 1       SOCIAL HISTORY:  Social History     Socioeconomic History     Marital status:      Spouse name: None     Number of children: None     Years of education: None     Highest education level: None   Tobacco Use     Smoking status: Never     Smokeless tobacco: Never   Substance and Sexual Activity     Alcohol use: No     Alcohol/week: 0.0 standard drinks of alcohol     Drug use: No     Sexual activity: Yes     Partners: Male     Birth  "control/protection: Post-menopausal, None   Other Topics Concern     Parent/sibling w/ CABG, MI or angioplasty before 65F 55M? 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    ETOH- none Exercise-  1 hr x 5 /week- Walks brisk, CC sk       Review of Systems:  Skin:          Eyes:         ENT:         Respiratory:  Negative       Cardiovascular:  Negative;palpitations;chest pain;dizziness;syncope or near-syncope;cyanosis;lightheadedness;exercise intolerance Gastroenterology:        Genitourinary:         Musculoskeletal:         Neurologic:         Psychiatric:         Heme/Lymph/Imm:         Endocrine:  Positive for diabetes      Physical Exam:  Vitals: /72   Pulse 69   Ht 1.588 m (5' 2.5\")   Wt 60.9 kg (134 lb 3.2 oz)   BMI 24.15 kg/m      General patient appears comfortable  Neck normal JVP  Cardiovascular system S1-S2 normal no murmur rub or gallop  Respiratory system clear to auscultation  Extremities no edema  Neurological alert, oriented    FAWN Carmona MD  [address]  [place] [id number]              "

## 2023-05-22 ENCOUNTER — OFFICE VISIT (OUTPATIENT)
Dept: OBGYN | Facility: CLINIC | Age: 63
End: 2023-05-22
Payer: COMMERCIAL

## 2023-05-22 VITALS
SYSTOLIC BLOOD PRESSURE: 122 MMHG | BODY MASS INDEX: 23.04 KG/M2 | DIASTOLIC BLOOD PRESSURE: 68 MMHG | HEIGHT: 63 IN | WEIGHT: 130 LBS

## 2023-05-22 DIAGNOSIS — N93.9 VAGINAL BLEEDING: ICD-10-CM

## 2023-05-22 DIAGNOSIS — E11.9 TYPE 2 DIABETES MELLITUS WITHOUT COMPLICATION, WITHOUT LONG-TERM CURRENT USE OF INSULIN (H): ICD-10-CM

## 2023-05-22 DIAGNOSIS — N89.8 VAGINAL DISCHARGE: Primary | ICD-10-CM

## 2023-05-22 LAB
CLUE CELLS: ABNORMAL
TRICHOMONAS, WET PREP: ABNORMAL
WBC'S/HIGH POWER FIELD, WET PREP: ABNORMAL
YEAST, WET PREP: ABNORMAL

## 2023-05-22 PROCEDURE — 87205 SMEAR GRAM STAIN: CPT | Performed by: STUDENT IN AN ORGANIZED HEALTH CARE EDUCATION/TRAINING PROGRAM

## 2023-05-22 PROCEDURE — 87210 SMEAR WET MOUNT SALINE/INK: CPT | Performed by: STUDENT IN AN ORGANIZED HEALTH CARE EDUCATION/TRAINING PROGRAM

## 2023-05-22 PROCEDURE — 87491 CHLMYD TRACH DNA AMP PROBE: CPT | Performed by: STUDENT IN AN ORGANIZED HEALTH CARE EDUCATION/TRAINING PROGRAM

## 2023-05-22 PROCEDURE — 87591 N.GONORRHOEAE DNA AMP PROB: CPT | Performed by: STUDENT IN AN ORGANIZED HEALTH CARE EDUCATION/TRAINING PROGRAM

## 2023-05-22 PROCEDURE — 87102 FUNGUS ISOLATION CULTURE: CPT | Performed by: STUDENT IN AN ORGANIZED HEALTH CARE EDUCATION/TRAINING PROGRAM

## 2023-05-22 PROCEDURE — 87661 TRICHOMONAS VAGINALIS AMPLIF: CPT | Performed by: STUDENT IN AN ORGANIZED HEALTH CARE EDUCATION/TRAINING PROGRAM

## 2023-05-22 PROCEDURE — 99203 OFFICE O/P NEW LOW 30 MIN: CPT | Performed by: STUDENT IN AN ORGANIZED HEALTH CARE EDUCATION/TRAINING PROGRAM

## 2023-05-22 NOTE — PROGRESS NOTES
[facility]  OB/GYN Clinic Note    SUBJECTIVE:                                                   Rubi Nicolas is a 62 year old  female who presents to clinic today for the following health issue(s):  Patient presents with:  Vaginal Discharge    HPI:  Patient presents due to episode of brown vaginal discharge. Occurred about three weeks ago, looks like spotting.  No itching. No concerns for STI. No blood thinners currently. Hx plavix use for MI, stopped it around the time that spotting was noticed.  Does not use hormones.  Pap smears normal recently, last in November. ASCUS a long time.Hx of ovarian cyst at age 15. Weighed 8lbs, via vertical midline skin incision. Father with primary lymphoma. No family hx of uterine, ovarian, colon, breast cancer.     No LMP recorded. Patient is postmenopausal..   Patient is sexually active, .   reports that she has never smoked. She has never used smokeless tobacco.  STD testing offered?  Declined  Health maintenance: reviewed    Today's PHQ-2 Score:       2023     3:38 PM   PHQ-2 (  [employer])   Q1: Little interest or pleasure in doing things 0   Q2: Feeling down, depressed or hopeless 0   PHQ-2 Score 0         Problem list and histories reviewed & adjusted, as indicated.  Additional history: as documented.    Patient Active Problem List   Diagnosis     HTN (hypertension)     Hyperlipidemia LDL goal <70     GERD (gastroesophageal reflux disease)     Overweight (BMI 25.0-29.9)     Abnormal AST and ALT     Obesity     Atypical squamous cells of undetermined significance (ASCUS) on Papanicolaou smear of cervix     Type 2 diabetes mellitus without complications (H)     Elevated troponin     CAD (coronary artery disease)     Perineal abscess     Annual physical exam     Screening for HIV (human immunodeficiency virus)     Cervical cancer screening     Dizziness     Black hairy tongue     Diarrhea, unspecified type     Past Surgical  History:   Procedure Laterality Date     APPENDECTOMY  1976    at time of USO     ARTHROSCOPY KNEE RT/LT       COLONOSCOPY N/A 11/9/2015    Procedure: COLONOSCOPY;  Surgeon: Kate Redmond MD;  Location:  GI     COLONOSCOPY N/A 2/14/2022    Procedure: COLONOSCOPY;  Surgeon: Kate Redmond MD;  Location:  GI     CV HEART CATHETERIZATION WITH POSSIBLE INTERVENTION N/A 2/28/2022    Procedure: Heart Catheterization with Possible Intervention;  Surgeon: Jh Powell MD;  Location:  HEART CARDIAC CATH LAB     [facility] LAP OVARIAN CYSTOTOMY  1976    salpingooopherectomy for large dermoid      Social History     Tobacco Use     Smoking status: Never     Smokeless tobacco: Never   Vaping Use     Vaping status: Not on file   Substance Use Topics     Alcohol use: No     Alcohol/week: 0.0 standard drinks of alcohol      Problem (# of Occurrences) Relation (Name,Age of Onset)    Dementia (1) Mother (Naima, 90)    Cancer (1) Father (Don, 68): brain ca    Diabetes (2) Mother (Naima, 60): type 2, Maternal Uncle: type 1    Hypertension (2) Mother (Naima), Father (Don)    Cerebrovascular Disease (1) Father (Don)            Current Outpatient Medications   Medication Sig     aspirin (ASA) 81 MG EC tablet Take 1 tablet (81 mg) by mouth daily     atorvastatin (LIPITOR) 40 MG tablet Take 1 tablet (40 mg) by mouth daily     carvedilol (COREG) 3.125 MG tablet Take 1 tablet (3.125 mg) by mouth 2 times daily (with meals)     Coenzyme Q10 (CO Q 10 PO)      Continuous Blood Gluc  (FREESTYLE ADRIANA 14 DAY READER) ROSALIE USE 1 DEVICE CONTINUOUSLY     Continuous Blood Gluc Sensor (FREESTYLE ADRIANA 14 DAY SENSOR) MISC USE ONE EVERY 14 DAYS     ezetimibe (ZETIA) 10 MG tablet Take 1 tablet (10 mg) by mouth daily     glimepiride (AMARYL) 2 MG tablet TAKE 3 TABLETS (6 MG) BY MOUTH EVERY MORNING (BEFORE BREAKFAST)     JANUVIA 50 MG tablet TAKE 1 TABLET BY MOUTH EVERY DAY     lisinopril (ZESTRIL) 5 MG tablet Take 1 tablet (5 mg)  "by mouth daily     metFORMIN (GLUCOPHAGE) 1000 MG tablet TAKE 1 TABLET BY MOUTH TWICE A DAY WITH MEALS     Microlet Lancets MISC 1 lancet daily     Multiple Vitamin (MULTIVITAMIN OR) Take 1 tablet by mouth daily.     nitroGLYcerin (NITROSTAT) 0.4 MG sublingual tablet For chest pain place 1 tablet under the tongue every 5 minutes for 3 doses. If symptoms persist 5 minutes after 1st dose call 911.     omeprazole (PRILOSEC) 20 MG DR capsule Take 20 mg by mouth twice a week Monday, Thursday     No current facility-administered medications for this visit.     Allergies   Allergen Reactions     Keflex [Cephalexin Hcl] Hives     Penicillins Hives     Ragweeds      Sneezing etc.           OBJECTIVE:     /68   Ht 1.588 m (5' 2.5\")   Wt 59 kg (130 lb)   BMI 23.40 kg/m    Body mass index is 23.4 kg/m .    Exam:  Constitutional:  Appearance: Well nourished, well developed alert, in no acute distress  Pelvic Exam:  External Genitalia:     Normal appearance for age, no discharge present, no tenderness present, no inflammatory lesions present, color normal  Vagina:     Normal vaginal vault without central or paravaginal defects, ATROPHIC. +Yellow/orange discharge. No blood.   Urethra:   Urethral Body:  Urethra palpation normal, urethra structural support normal   Urethral Meatus:  No erythema or lesions present  Cervix:     Appearance healthy, no lesions present, nontender to palpation, no bleeding present  Uterus:     Nontender to palpation, no masses present, position anteflexed, mobility: normal  Adnexa:     No adnexal tenderness present, no adnexal masses present  Perineum:     Perineum within normal limits, no evidence of trauma, no rashes or skin lesions present  +Small boil on right gluteal area, appears to recently have had some bleeding, patient states this occurred last year and has been poorly healing.            ASSESSMENT/PLAN:                                                        ICD-10-CM    1. Vaginal " discharge  N89.8 US Transvaginal Pelvic Non-OB     Trichomonas vaginalis by PCR     NEISSERIA GONORRHOEA PCR     CHLAMYDIA TRACHOMATIS PCR     Bacterial Vaginosis Smear     Fungal or Yeast Culture Routine     Wet preparation      2. Type 2 diabetes mellitus without complication, without long-term current use of insulin (H)  E11.9       3. Vaginal bleeding  N93.9 US Transvaginal Pelvic Non-OB        Rubi Nicolas is a 62 year old  with episode of brown vaginal bleeding. Exam as above. Swabs collected. Will provide rx as indicated. Recommend pelvic US to evaluate endometrial lining. Discussed possibility of EMB after US. Follow-up to review US.     Hanna Melissa MD, MHS  [facility] [place]  23

## 2023-05-23 LAB
BACTERIAL VAGINOSIS SMEAR: ABNORMAL
BACTERIAL VAGINOSIS SMEAR: ABNORMAL
C TRACH DNA SPEC QL NAA+PROBE: NEGATIVE
N GONORRHOEA DNA SPEC QL NAA+PROBE: NEGATIVE
NUGENT SCORE: 4
T VAGINALIS DNA SPEC QL NAA+PROBE: NOT DETECTED
WHITE BLOOD CELLS: ABNORMAL

## 2023-05-26 RX ORDER — CLINDAMYCIN PHOSPHATE 20 MG/G
1 CREAM VAGINAL AT BEDTIME
Qty: 40 G | Refills: 0 | Status: SHIPPED | OUTPATIENT
Start: 2023-05-26 | End: 2023-06-02

## 2023-05-27 ENCOUNTER — HEALTH MAINTENANCE LETTER (OUTPATIENT)
Age: 63
End: 2023-05-27

## 2023-05-27 LAB — BACTERIA SPEC CULT: NO GROWTH

## 2023-06-13 ENCOUNTER — OFFICE VISIT (OUTPATIENT)
Dept: OBGYN | Facility: CLINIC | Age: 63
End: 2023-06-13
Payer: COMMERCIAL

## 2023-06-13 ENCOUNTER — ANCILLARY PROCEDURE (OUTPATIENT)
Dept: ULTRASOUND IMAGING | Facility: CLINIC | Age: 63
End: 2023-06-13
Payer: COMMERCIAL

## 2023-06-13 VITALS
SYSTOLIC BLOOD PRESSURE: 102 MMHG | HEIGHT: 63 IN | WEIGHT: 132.2 LBS | DIASTOLIC BLOOD PRESSURE: 68 MMHG | BODY MASS INDEX: 23.42 KG/M2

## 2023-06-13 DIAGNOSIS — N93.9 VAGINAL BLEEDING: Primary | ICD-10-CM

## 2023-06-13 DIAGNOSIS — N93.9 VAGINAL BLEEDING: ICD-10-CM

## 2023-06-13 DIAGNOSIS — R93.89 THICKENED ENDOMETRIUM: ICD-10-CM

## 2023-06-13 DIAGNOSIS — N89.8 VAGINAL DISCHARGE: ICD-10-CM

## 2023-06-13 PROCEDURE — 99214 OFFICE O/P EST MOD 30 MIN: CPT | Mod: 25 | Performed by: STUDENT IN AN ORGANIZED HEALTH CARE EDUCATION/TRAINING PROGRAM

## 2023-06-13 PROCEDURE — 58100 BIOPSY OF UTERUS LINING: CPT | Performed by: STUDENT IN AN ORGANIZED HEALTH CARE EDUCATION/TRAINING PROGRAM

## 2023-06-13 PROCEDURE — 76830 TRANSVAGINAL US NON-OB: CPT | Performed by: STUDENT IN AN ORGANIZED HEALTH CARE EDUCATION/TRAINING PROGRAM

## 2023-06-13 PROCEDURE — 88305 TISSUE EXAM BY PATHOLOGIST: CPT | Performed by: PATHOLOGY

## 2023-06-13 NOTE — PROGRESS NOTES
"[facility]  OB/GYN Clinic Note    SUBJECTIVE:                                                   Rubi Nicolas is a 62 year old  female who presents to clinic today for the following health issue(s):  Patient presents with:  Follow Up: Review US results    Additional information: US to evaluate endometrial lining possible EMB    HPI:  Patient comes in for followup of US done for possible postmenopausal bleeding. Was treated with clindamycin for vaginal suspected group B strep infection. Symptoms of discharge, brown discoloration are improved. Denies vaginal bleeding.     No LMP recorded. Patient is postmenopausal..   Patient is sexually active, .  Using menopause for contraception.    reports that she has never smoked. She has never used smokeless tobacco.  STD testing offered?  Declined  Health maintenance updated:  yes      OBJECTIVE:     /68   Ht 1.588 m (5' 2.5\")   Wt 60 kg (132 lb 3.2 oz)   BMI 23.79 kg/m    Body mass index is 23.79 kg/m .    Exam:  Constitutional:  Appearance: Well nourished, well developed alert, in no acute distress      In-Clinic Test Results:  Results for orders placed or performed in visit on 23 (from the past 24 hour(s))   US Transvaginal Pelvic Non-OB    Narrative    Table formatting from the original result was not included.     Gynecological Ultrasound Report  Pelvic U/S - Transvaginal  [facility]    Referring Provider: Hanna Melissa MD   Sonographer:  Brittnee Spurling, RDMS  Indication: Vaginal discharge  LMP: No LMP recorded. Patient is postmenopausal.  History: left oopherectomy and salpingectomy  Gynecological Ultrasonography:   Uterus: mid-position. Contour is irregular w/ myomata: 1 Midline   Intramural 1.9 x 1.1 x 1.6 cm.  Size: 4.1 x 3.5 x 2.9 cm  Endometrium: Thickness Total 5.5 mm  Findings: uterine fibroid  Right Ovary: 2.5 x 2.0 x 1.0 cm. Wnl  Left Ovary:  Absent  Cul de Sac Free Fluid: No " free fluid     Impression:   The uterus and right ovary was visualized. Left ovary is surgically   absent, right ovary appears normal. The lining is of the uterus measures   5.5, which is slightly thickened in menopause. There is a right midline   intramural fibroid measuring 1.9cm.     Hanna Melissa MD, [facility]  23         ASSESSMENT/PLAN:                                                        ICD-10-CM    1. Vaginal bleeding  N93.9 Surgical Pathology Exam      2. Thickened endometrium  R93.89         Rubi Nicolas is a 62 year old  with episode of post menopausal spotting. US shows very slightly thickened EMS of 5.5. Long discussion today about symptoms, US findings, recommendation for biopsy, even though this is very unlikely to be cancerous/precancerous process. Reviewed <4mm rules out cancer. More likely, the EMS is slightly thickened due to recent infection. Base on previous exam, brown discharge was likely due to bacterial infection of vagina. Recommend EMB today. Reviewed that if tissue is insufficient for evaluation, I do not feel repeat biopsy is needed given overall reassuring picture. All questions answered and patient is amenable to this plan of care.     30 minutes in addition to the procedure were spent on the date of the encounter doing chart review, review of outside records, review and interpretation of pertinent test results, history and exam, documentation, patient counseling, and further activities as noted above.      Hanna Melissa MD, S  [facility] [place]  23    INDICATIONS:                                                    Is a pregnancy test required: No.  Was a consent obtained?  Yes    Having endometrial biopsy for post menopausal bleeding, EMS 5.5mm      PROCEDURE;                                                      A speculum was placed in the vagina and cervix prepped with betadine. A tenaculum was not attached to the cervix. A  small plastic 5 mm Pipelle syringe curette was inserted into the cervical canal. The uterus was sounded to 6 cm's. A vigorous four quadrant biopsy was performed, removing amount scant  of tissue. A vigorous biopsy was repeated once more. Scant tissue again obtained. The speculum was removed. This tissue was placed in formalin and sent to pathology.    The patient tolerated the procedure  well and she reported there was  cramping.      POST PROCEDURE;                                                      There  was no cramping at the time of discharge. She  tolerated the procedure well with minimal discomfort. There were no complications. Patient was discharged in stable condition.    Patient advised to call the clinic if severe pelvic pain, fever or heavy bleeding.    Hanna Melissa MD, MHS  06/13/23

## 2023-06-14 DIAGNOSIS — E11.9 TYPE 2 DIABETES MELLITUS WITHOUT COMPLICATION, WITHOUT LONG-TERM CURRENT USE OF INSULIN (H): ICD-10-CM

## 2023-06-14 RX ORDER — FLASH GLUCOSE SENSOR
KIT MISCELLANEOUS
Qty: 2 EACH | Refills: 11 | Status: SHIPPED | OUTPATIENT
Start: 2023-06-14

## 2023-06-15 LAB
PATH REPORT.COMMENTS IMP SPEC: NORMAL
PATH REPORT.FINAL DX SPEC: NORMAL
PATH REPORT.GROSS SPEC: NORMAL
PATH REPORT.MICROSCOPIC SPEC OTHER STN: NORMAL
PATH REPORT.RELEVANT HX SPEC: NORMAL
PHOTO IMAGE: NORMAL

## 2023-06-27 ENCOUNTER — TELEPHONE (OUTPATIENT)
Dept: FAMILY MEDICINE | Facility: CLINIC | Age: 63
End: 2023-06-27

## 2023-06-27 NOTE — PROGRESS NOTES
----- Message from Samuel Kerr MD sent at 6/27/2023 12:57 PM CDT -----  Please inform, renal labs till elevated from his baseline, but no more worsening. I would recommend him to establish with nephrology.     Patient will be coming in on 1/31 and she is requesting to get her a1c and cholesterol tests drawn. Pleaser review and future lab orders if needed.    Thanks

## 2023-06-27 NOTE — TELEPHONE ENCOUNTER
Reason for Call:  Appointment Request    Patient requesting this type of appt: Chronic Diease Management/Medication/Follow-Up    Requested provider: Carmina Bernal    Reason patient unable to be scheduled: Not within requested timeframe    When does patient want to be seen/preferred time: 1-2 days    Comments: Diabetes check, late on the 6months check already, needing blood test as well    Could we send this information to you in [place] or would you prefer to receive a phone call?:   Patient would like to be contacted via [place]    Call taken on 6/27/2023 at 9:34 AM by Aurelia Wilson

## 2023-07-05 ENCOUNTER — OFFICE VISIT (OUTPATIENT)
Dept: FAMILY MEDICINE | Facility: CLINIC | Age: 63
End: 2023-07-05
Payer: COMMERCIAL

## 2023-07-05 VITALS
RESPIRATION RATE: 16 BRPM | HEART RATE: 62 BPM | BODY MASS INDEX: 23.67 KG/M2 | WEIGHT: 131.5 LBS | TEMPERATURE: 97.6 F | OXYGEN SATURATION: 99 % | SYSTOLIC BLOOD PRESSURE: 102 MMHG | DIASTOLIC BLOOD PRESSURE: 64 MMHG

## 2023-07-05 DIAGNOSIS — E11.9 TYPE 2 DIABETES MELLITUS WITHOUT COMPLICATION, WITHOUT LONG-TERM CURRENT USE OF INSULIN (H): Primary | ICD-10-CM

## 2023-07-05 DIAGNOSIS — M54.42 CHRONIC BILATERAL LOW BACK PAIN WITH BILATERAL SCIATICA: ICD-10-CM

## 2023-07-05 DIAGNOSIS — G89.29 CHRONIC BILATERAL LOW BACK PAIN WITH BILATERAL SCIATICA: ICD-10-CM

## 2023-07-05 DIAGNOSIS — M54.41 CHRONIC BILATERAL LOW BACK PAIN WITH BILATERAL SCIATICA: ICD-10-CM

## 2023-07-05 DIAGNOSIS — E78.5 HYPERLIPIDEMIA LDL GOAL <70: ICD-10-CM

## 2023-07-05 LAB
CREAT UR-MCNC: 56.9 MG/DL
HBA1C MFR BLD: 6.8 % (ref 0–5.6)
MICROALBUMIN UR-MCNC: <12 MG/L
MICROALBUMIN/CREAT UR: NORMAL MG/G{CREAT}

## 2023-07-05 PROCEDURE — 82570 ASSAY OF URINE CREATININE: CPT | Performed by: INTERNAL MEDICINE

## 2023-07-05 PROCEDURE — 83036 HEMOGLOBIN GLYCOSYLATED A1C: CPT | Performed by: INTERNAL MEDICINE

## 2023-07-05 PROCEDURE — 36415 COLL VENOUS BLD VENIPUNCTURE: CPT | Performed by: INTERNAL MEDICINE

## 2023-07-05 PROCEDURE — 99214 OFFICE O/P EST MOD 30 MIN: CPT | Performed by: INTERNAL MEDICINE

## 2023-07-05 PROCEDURE — 82043 UR ALBUMIN QUANTITATIVE: CPT | Performed by: INTERNAL MEDICINE

## 2023-07-05 ASSESSMENT — PAIN SCALES - GENERAL: PAINLEVEL: MILD PAIN (2)

## 2023-07-05 NOTE — PROGRESS NOTES
Assessment & Plan     Type 2 diabetes mellitus without complication, without long-term current use of insulin (H)  - Albumin Random Urine Quantitative with Creat Ratio; Future  - HEMOGLOBIN A1C; Future  - PRIMARY CARE FOLLOW-UP SCHEDULING; Future    Hyperlipidemia LDL goal <70  Stable.  Continue medication    Chronic bilateral low back pain with bilateral sciatica  Chronic pain in the back which radiates down her legs.  Refer to physical therapy.  - Physical Therapy Referral; Future       See Patient Instructions    STEPHEN GAYTAN MD  [facility] JESSI Venegas is a 62 year old, presenting for the following health issues:  Follow Up    HPI     Mary is a very pleasant 62-year-old lady who presented to the clinic for follow-up.    She has type 2 diabetes and recently started taking Januvia 50 mg daily along with metformin 1000 mg twice daily.    She brings her blood sugar monitor, her blood sugar and fasting have been within normal range.    She has lower back pain with sciatica.  She has been able to manage it with over-the-counter painkillers.      Review of Systems         Objective    There were no vitals taken for this visit.  There is no height or weight on file to calculate BMI.  Physical Exam  Cardiovascular:      Pulses:           Dorsalis pedis pulses are 3+ on the right side and 3+ on the left side.   Feet:      Right foot:      Protective Sensation: 10 sites tested. 10 sites sensed.      Toenail Condition: Right toenails are normal.      Left foot:      Protective Sensation: 10 sites tested. 10 sites sensed.      Toenail Condition: Left toenails are normal.      Comments: Thick skin on plantar surface   Congenital tylosis of plantar surface

## 2023-07-06 PROBLEM — M54.42 CHRONIC BILATERAL LOW BACK PAIN WITH BILATERAL SCIATICA: Status: ACTIVE | Noted: 2023-07-06

## 2023-07-06 PROBLEM — M54.41 CHRONIC BILATERAL LOW BACK PAIN WITH BILATERAL SCIATICA: Status: ACTIVE | Noted: 2023-07-06

## 2023-07-06 PROBLEM — G89.29 CHRONIC BILATERAL LOW BACK PAIN WITH BILATERAL SCIATICA: Status: ACTIVE | Noted: 2023-07-06

## 2023-07-30 ENCOUNTER — MYC MEDICAL ADVICE (OUTPATIENT)
Dept: FAMILY MEDICINE | Facility: CLINIC | Age: 63
End: 2023-07-30
Payer: COMMERCIAL

## 2023-07-30 DIAGNOSIS — E11.9 TYPE 2 DIABETES MELLITUS WITHOUT COMPLICATION, WITHOUT LONG-TERM CURRENT USE OF INSULIN (H): ICD-10-CM

## 2023-08-30 ENCOUNTER — THERAPY VISIT (OUTPATIENT)
Dept: PHYSICAL THERAPY | Facility: CLINIC | Age: 63
End: 2023-08-30
Attending: INTERNAL MEDICINE
Payer: COMMERCIAL

## 2023-08-30 DIAGNOSIS — M54.41 CHRONIC BILATERAL LOW BACK PAIN WITH BILATERAL SCIATICA: ICD-10-CM

## 2023-08-30 DIAGNOSIS — G89.29 CHRONIC BILATERAL LOW BACK PAIN WITH BILATERAL SCIATICA: ICD-10-CM

## 2023-08-30 DIAGNOSIS — M54.42 CHRONIC BILATERAL LOW BACK PAIN WITH BILATERAL SCIATICA: ICD-10-CM

## 2023-08-30 PROCEDURE — 97110 THERAPEUTIC EXERCISES: CPT | Mod: GP | Performed by: PHYSICAL THERAPIST

## 2023-08-30 PROCEDURE — 97161 PT EVAL LOW COMPLEX 20 MIN: CPT | Mod: GP | Performed by: PHYSICAL THERAPIST

## 2023-08-30 NOTE — PROGRESS NOTES
PHYSICAL THERAPY EVALUATION  Type of Visit: Evaluation    See electronic medical record for Abuse and Falls Screening details.    Subjective  Monthly average is 10.6k steps per day      Presenting condition or subjective complaint: piriformis - tingling and numbness in feet, pain and buttock  Date of onset: 04/01/23    Relevant medical history: Diabetes; High blood pressure   Prior diagnostic imaging/testing results:     None   Prior therapy history for the same diagnosis, illness or injury: No      Living Environment  Social support: With a significant other or spouse   Type of home: House   Stairs to enter the home: Yes   Is there a railing: No   Stairs inside the home: Yes         Employment:   retired  Hobbies/Interests: Backpacking/canoeing/hiking/biking    Patient goals for therapy: Hike/backpack/evening walks, symptoms worsen at night, symptoms are relieved by NSAIDS    Pain assessment: Pain present  See objective evaluation for additional pain details     Objective   LUMBAR SPINE EVALUATION    PAIN: Pain Location: bilateral gluteal pain  Pain Quality: dullness, numbness/tingling down the leg into the feet  Pain is Worst: going to bed, end of the day, achy in the morning  Pain is Exacerbated By: by about 3-4pm it gets really stiff, and then she does the stretches it kind of makes it worse, by 8pm it really feels bad - her hobby is hiking and backpacking, tightness in the morning with stretching lasts to about 3-4pm, she is constantly going all day long  Pain is Relieved By: IBU and aleve, stretches  Pain Progression: no improvements or not getting any worse    ROM:   (Degrees) Left AROM Left PROM  Right AROM Right PROM   Hip Flexion       Hip Extension       Hip Abduction       Hip Adduction       Hip Internal Rotation       Hip External Rotation       Knee Flexion       Knee Extension       Lumbar Side glide WFL WFL   Lumbar Flexion Hands onto the floor, excessive mobility   Lumbar Extension No inc of P,  excessive mobility     MYOTOMES:  WFL  FLEXIBILITY: hypermobility of lumbar spine and hamstrings, tightness of HF  PALPATION: no TTP found    LUMBAR/HIP Special Tests:    Left Right   MARA - -   FADIR/Labrum/GEORGES - -   Femoral Nerve     Vlad's     Piriformis     Quadrant Testing     SLR - -   Slump - -   Stork with Extension     Ghulam              Assessment & Plan   CLINICAL IMPRESSIONS  Medical Diagnosis: Chronic low back pain with bilateral sciatica    Treatment Diagnosis: Bilateral SIJ pain, gluteal pain   Impression/Assessment: Patient is a 62 year old female with bilateral SIJ pain complaints.  The following significant findings have been identified: Pain, Decreased ROM/flexibility, Decreased joint mobility, Decreased strength, and Decreased activity tolerance. These impairments interfere with their ability to perform self care tasks, recreational activities, household chores, household mobility, and community mobility as compared to previous level of function.     Clinical Decision Making (Complexity):  Clinical Presentation: Stable/Uncomplicated  Clinical Presentation Rationale: based on medical and personal factors listed in PT evaluation  Clinical Decision Making (Complexity): Low complexity    PLAN OF CARE  Treatment Interventions:  Modalities: Cryotherapy, E-stim, Hot Pack, Mechanical Traction, Ultrasound  Interventions: Manual Therapy, Neuromuscular Re-education, Therapeutic Activity, Therapeutic Exercise, Self-Care/Home Management    Long Term Goals     PT Goal 1  Goal Identifier: Pain  Goal Description: Patient will be able to report not getting any increase of pain symptoms after her morning stretch until after dinner or none at all, in order to improve mobility and decrease P  Goal Progress: Baseline: feels it at about 3-4pm everyday  Target Date: 10/14/23  PT Goal 2  Goal Identifier: Hiking  Goal Description: Patient will be able to perform a hiking/backpacking trip without difficulty or increased  P symptoms with improved low back strength in order to not have injury or fear.  Target Date: 11/27/23      Frequency of Treatment: 1x/week  Duration of Treatment: 10 weeks    Education Assessment:        Risks and benefits of evaluation/treatment have been explained.   Patient/Family/caregiver agrees with Plan of Care.     Evaluation Time:     PT Eval, Low Complexity Minutes (14708): 15    Signing Clinician: LORENZO RESENDEZ PT

## 2023-09-11 ENCOUNTER — THERAPY VISIT (OUTPATIENT)
Dept: PHYSICAL THERAPY | Facility: CLINIC | Age: 63
End: 2023-09-11
Attending: INTERNAL MEDICINE
Payer: COMMERCIAL

## 2023-09-11 DIAGNOSIS — M54.41 CHRONIC BILATERAL LOW BACK PAIN WITH BILATERAL SCIATICA: Primary | ICD-10-CM

## 2023-09-11 DIAGNOSIS — M54.42 CHRONIC BILATERAL LOW BACK PAIN WITH BILATERAL SCIATICA: Primary | ICD-10-CM

## 2023-09-11 DIAGNOSIS — G89.29 CHRONIC BILATERAL LOW BACK PAIN WITH BILATERAL SCIATICA: Primary | ICD-10-CM

## 2023-09-11 PROCEDURE — 97110 THERAPEUTIC EXERCISES: CPT | Mod: GP | Performed by: PHYSICAL THERAPIST

## 2023-09-27 ENCOUNTER — THERAPY VISIT (OUTPATIENT)
Dept: PHYSICAL THERAPY | Facility: CLINIC | Age: 63
End: 2023-09-27
Attending: INTERNAL MEDICINE
Payer: COMMERCIAL

## 2023-09-27 DIAGNOSIS — M54.42 CHRONIC BILATERAL LOW BACK PAIN WITH BILATERAL SCIATICA: Primary | ICD-10-CM

## 2023-09-27 DIAGNOSIS — G89.29 CHRONIC BILATERAL LOW BACK PAIN WITH BILATERAL SCIATICA: Primary | ICD-10-CM

## 2023-09-27 DIAGNOSIS — M54.41 CHRONIC BILATERAL LOW BACK PAIN WITH BILATERAL SCIATICA: Primary | ICD-10-CM

## 2023-09-27 PROCEDURE — 97110 THERAPEUTIC EXERCISES: CPT | Mod: GP | Performed by: PHYSICAL THERAPIST

## 2023-10-19 ENCOUNTER — THERAPY VISIT (OUTPATIENT)
Dept: PHYSICAL THERAPY | Facility: CLINIC | Age: 63
End: 2023-10-19
Payer: COMMERCIAL

## 2023-10-19 DIAGNOSIS — M54.41 CHRONIC BILATERAL LOW BACK PAIN WITH BILATERAL SCIATICA: Primary | ICD-10-CM

## 2023-10-19 DIAGNOSIS — G89.29 CHRONIC BILATERAL LOW BACK PAIN WITH BILATERAL SCIATICA: Primary | ICD-10-CM

## 2023-10-19 DIAGNOSIS — M54.42 CHRONIC BILATERAL LOW BACK PAIN WITH BILATERAL SCIATICA: Primary | ICD-10-CM

## 2023-10-19 PROCEDURE — 97110 THERAPEUTIC EXERCISES: CPT | Mod: GP | Performed by: PHYSICAL THERAPIST

## 2023-11-08 DIAGNOSIS — I10 PRIMARY HYPERTENSION: ICD-10-CM

## 2023-11-08 DIAGNOSIS — I21.4 NSTEMI (NON-ST ELEVATED MYOCARDIAL INFARCTION) (H): ICD-10-CM

## 2023-11-08 DIAGNOSIS — E11.9 TYPE 2 DIABETES MELLITUS WITHOUT COMPLICATION, WITHOUT LONG-TERM CURRENT USE OF INSULIN (H): ICD-10-CM

## 2023-11-08 RX ORDER — CARVEDILOL 3.12 MG/1
3.12 TABLET ORAL 2 TIMES DAILY WITH MEALS
Qty: 180 TABLET | Refills: 1 | Status: SHIPPED | OUTPATIENT
Start: 2023-11-08 | End: 2024-04-04

## 2023-11-30 ENCOUNTER — THERAPY VISIT (OUTPATIENT)
Dept: PHYSICAL THERAPY | Facility: CLINIC | Age: 63
End: 2023-11-30
Payer: COMMERCIAL

## 2023-11-30 DIAGNOSIS — M54.41 CHRONIC BILATERAL LOW BACK PAIN WITH BILATERAL SCIATICA: Primary | ICD-10-CM

## 2023-11-30 DIAGNOSIS — G89.29 CHRONIC BILATERAL LOW BACK PAIN WITH BILATERAL SCIATICA: Primary | ICD-10-CM

## 2023-11-30 DIAGNOSIS — M54.42 CHRONIC BILATERAL LOW BACK PAIN WITH BILATERAL SCIATICA: Primary | ICD-10-CM

## 2023-11-30 PROCEDURE — 97530 THERAPEUTIC ACTIVITIES: CPT | Mod: GP | Performed by: PHYSICAL THERAPIST

## 2023-11-30 PROCEDURE — 97110 THERAPEUTIC EXERCISES: CPT | Mod: 59 | Performed by: PHYSICAL THERAPIST

## 2023-12-06 ENCOUNTER — OFFICE VISIT (OUTPATIENT)
Dept: FAMILY MEDICINE | Facility: CLINIC | Age: 63
End: 2023-12-06
Payer: COMMERCIAL

## 2023-12-06 VITALS
OXYGEN SATURATION: 99 % | TEMPERATURE: 98 F | SYSTOLIC BLOOD PRESSURE: 118 MMHG | HEART RATE: 56 BPM | DIASTOLIC BLOOD PRESSURE: 73 MMHG

## 2023-12-06 DIAGNOSIS — M54.42 CHRONIC BILATERAL LOW BACK PAIN WITH BILATERAL SCIATICA: ICD-10-CM

## 2023-12-06 DIAGNOSIS — E11.9 TYPE 2 DIABETES MELLITUS WITHOUT COMPLICATION, WITHOUT LONG-TERM CURRENT USE OF INSULIN (H): ICD-10-CM

## 2023-12-06 DIAGNOSIS — I10 PRIMARY HYPERTENSION: ICD-10-CM

## 2023-12-06 DIAGNOSIS — M54.41 CHRONIC BILATERAL LOW BACK PAIN WITH BILATERAL SCIATICA: ICD-10-CM

## 2023-12-06 DIAGNOSIS — K21.9 GASTROESOPHAGEAL REFLUX DISEASE WITHOUT ESOPHAGITIS: ICD-10-CM

## 2023-12-06 DIAGNOSIS — Z00.00 ANNUAL PHYSICAL EXAM: Primary | ICD-10-CM

## 2023-12-06 DIAGNOSIS — G89.29 CHRONIC BILATERAL LOW BACK PAIN WITH BILATERAL SCIATICA: ICD-10-CM

## 2023-12-06 LAB
ANION GAP SERPL CALCULATED.3IONS-SCNC: 13 MMOL/L (ref 7–15)
BUN SERPL-MCNC: 12 MG/DL (ref 8–23)
CALCIUM SERPL-MCNC: 9.6 MG/DL (ref 8.8–10.2)
CHLORIDE SERPL-SCNC: 105 MMOL/L (ref 98–107)
CHOLEST SERPL-MCNC: 128 MG/DL
CREAT SERPL-MCNC: 0.61 MG/DL (ref 0.51–0.95)
DEPRECATED HCO3 PLAS-SCNC: 24 MMOL/L (ref 22–29)
EGFRCR SERPLBLD CKD-EPI 2021: >90 ML/MIN/1.73M2
ERYTHROCYTE [DISTWIDTH] IN BLOOD BY AUTOMATED COUNT: 12.5 % (ref 10–15)
FASTING STATUS PATIENT QL REPORTED: YES
GLUCOSE SERPL-MCNC: 151 MG/DL (ref 70–99)
HBA1C MFR BLD: 6.7 % (ref 0–5.6)
HCT VFR BLD AUTO: 36.8 % (ref 35–47)
HDLC SERPL-MCNC: 66 MG/DL
HGB BLD-MCNC: 12.3 G/DL (ref 11.7–15.7)
LDLC SERPL CALC-MCNC: 51 MG/DL
MCH RBC QN AUTO: 29.9 PG (ref 26.5–33)
MCHC RBC AUTO-ENTMCNC: 33.4 G/DL (ref 31.5–36.5)
MCV RBC AUTO: 89 FL (ref 78–100)
NONHDLC SERPL-MCNC: 62 MG/DL
PLATELET # BLD AUTO: 232 10E3/UL (ref 150–450)
POTASSIUM SERPL-SCNC: 4.2 MMOL/L (ref 3.4–5.3)
RBC # BLD AUTO: 4.12 10E6/UL (ref 3.8–5.2)
SODIUM SERPL-SCNC: 142 MMOL/L (ref 135–145)
TRIGL SERPL-MCNC: 57 MG/DL
WBC # BLD AUTO: 5.3 10E3/UL (ref 4–11)

## 2023-12-06 PROCEDURE — 99214 OFFICE O/P EST MOD 30 MIN: CPT | Mod: 25 | Performed by: INTERNAL MEDICINE

## 2023-12-06 PROCEDURE — 83036 HEMOGLOBIN GLYCOSYLATED A1C: CPT | Performed by: INTERNAL MEDICINE

## 2023-12-06 PROCEDURE — 85027 COMPLETE CBC AUTOMATED: CPT | Performed by: INTERNAL MEDICINE

## 2023-12-06 PROCEDURE — 36415 COLL VENOUS BLD VENIPUNCTURE: CPT | Performed by: INTERNAL MEDICINE

## 2023-12-06 PROCEDURE — 99396 PREV VISIT EST AGE 40-64: CPT | Performed by: INTERNAL MEDICINE

## 2023-12-06 PROCEDURE — 80061 LIPID PANEL: CPT | Performed by: INTERNAL MEDICINE

## 2023-12-06 PROCEDURE — 80048 BASIC METABOLIC PNL TOTAL CA: CPT | Performed by: INTERNAL MEDICINE

## 2023-12-06 ASSESSMENT — ENCOUNTER SYMPTOMS
SHORTNESS OF BREATH: 0
HEMATOCHEZIA: 0
SORE THROAT: 0
ABDOMINAL PAIN: 0
EYE PAIN: 0
COUGH: 0
NERVOUS/ANXIOUS: 0
JOINT SWELLING: 0
DIARRHEA: 0
BREAST MASS: 0
CONSTIPATION: 0
PARESTHESIAS: 0
FEVER: 0
HEARTBURN: 0
ARTHRALGIAS: 0
WEAKNESS: 0
CHILLS: 0
FREQUENCY: 0
PALPITATIONS: 0
NAUSEA: 0
HEMATURIA: 0
DIZZINESS: 0
MYALGIAS: 0
HEADACHES: 0
DYSURIA: 0

## 2023-12-06 NOTE — PROGRESS NOTES
SUBJECTIVE:   Rubi is a 62 year old, presenting for the following:  Physical      Rubi is here for APE  She has DM-2, CGM and readings are usually between 110-125 in fasting.   She has HTN, lower back pain and GERD.  She is doing well today, no concerns   She is up to date with mammogram, pap and colon cancer screening.  She is up to date with vaccines.      Healthy Habits:     Getting at least 3 servings of Calcium per day:  Yes    Bi-annual eye exam:  Yes    Dental care twice a year:  Yes    Sleep apnea or symptoms of sleep apnea:  None    Diet:  Diabetic    Frequency of exercise:  6-7 days/week    Duration of exercise:  45-60 minutes    Taking medications regularly:  Yes    Medication side effects:  None    Additional concerns today:  No      Social History     Tobacco Use    Smoking status: Never    Smokeless tobacco: Never   Substance Use Topics    Alcohol use: No     Alcohol/week: 0.0 standard drinks of alcohol         2023    10:40 AM   Alcohol Use   Prescreen: >3 drinks/day or >7 drinks/week? Not Applicable       Reviewed orders with patient.  Reviewed health maintenance and updated orders accordingly - Yes  Lab work is in process    Breast Cancer Screenin/7/2022     9:39 AM   Breast CA Risk Assessment (FHS-7)   Do you have a family history of breast, colon, or ovarian cancer? No / Unknown     Mammogram Screening: Recommended mammography every 1-2 years with patient discussion and risk factor consideration  Pertinent mammograms are reviewed under the imaging tab.    History of abnormal Pap smear: NO - age 30-65 PAP every 5 years with negative HPV co-testing recommended      Latest Ref Rng & Units 2022    10:33 AM 2019     1:46 PM 2019     1:16 PM   PAP / HPV   PAP  Negative for Intraepithelial Lesion or Malignancy (NILM)      PAP (Historical)    NIL    HPV 16 DNA Negative Negative  Negative     HPV 18 DNA Negative Negative  Negative     Other HR HPV Negative Negative   Negative       Reviewed and updated as needed this visit by clinical staff   Tobacco  Allergies  Meds              Reviewed and updated as needed this visit by Provider     Meds             Review of Systems   Constitutional:  Negative for chills and fever.   HENT:  Negative for congestion, ear pain, hearing loss and sore throat.    Eyes:  Negative for pain and visual disturbance.   Respiratory:  Negative for cough and shortness of breath.    Cardiovascular:  Negative for chest pain, palpitations and peripheral edema.   Gastrointestinal:  Negative for abdominal pain, constipation, diarrhea, heartburn, hematochezia and nausea.   Breasts:  Negative for tenderness, breast mass and discharge.   Genitourinary:  Negative for dysuria, frequency, genital sores, hematuria, pelvic pain, urgency, vaginal bleeding and vaginal discharge.   Musculoskeletal:  Negative for arthralgias, joint swelling and myalgias.   Skin:  Negative for rash.   Neurological:  Negative for dizziness, weakness, headaches and paresthesias.   Psychiatric/Behavioral:  Negative for mood changes. The patient is not nervous/anxious.      OBJECTIVE:   /73 (BP Location: Right arm, Patient Position: Sitting, Cuff Size: Adult Regular)   Pulse 56   Temp 98  F (36.7  C) (Oral)   SpO2 99%   Physical Exam  Vitals reviewed.   Constitutional:       Appearance: Normal appearance.   HENT:      Right Ear: Tympanic membrane normal. There is no impacted cerumen.      Left Ear: Tympanic membrane normal. There is no impacted cerumen.      Mouth/Throat:      Mouth: Mucous membranes are moist.      Pharynx: Oropharynx is clear. No oropharyngeal exudate or posterior oropharyngeal erythema.   Cardiovascular:      Rate and Rhythm: Normal rate and regular rhythm.      Heart sounds: Normal heart sounds. No murmur heard.     No gallop.   Pulmonary:      Effort: Pulmonary effort is normal. No respiratory distress.      Breath sounds: Normal breath sounds. No stridor. No  wheezing, rhonchi or rales.   Abdominal:      General: Abdomen is flat. There is no distension.      Palpations: Abdomen is soft. There is no mass.      Tenderness: There is no abdominal tenderness. There is no guarding.      Hernia: No hernia is present.   Musculoskeletal:         General: Normal range of motion.      Cervical back: Normal range of motion and neck supple. No rigidity or tenderness.      Right lower leg: No edema.      Left lower leg: No edema.   Lymphadenopathy:      Cervical: No cervical adenopathy.   Skin:     General: Skin is warm and dry.   Neurological:      General: No focal deficit present.      Mental Status: She is alert.   Psychiatric:         Mood and Affect: Mood normal.         ASSESSMENT/PLAN:   Rubi was seen today for physical.    Diagnoses and all orders for this visit:    Annual physical exam  -     BASIC METABOLIC PANEL; Future  -     CBC with Platelets (Today); Future  -     Lipid Profile; Future    Type 2 diabetes mellitus without complication, without long-term current use of insulin (H)  UTD foot exam and eye exam this year.   Continue metformin and januvia.  -     Hemoglobin A1c    Primary hypertension  Stable. Continue medication.    Chronic bilateral low back pain with bilateral sciatica  Stable. Currently undergoing PT    Gastroesophageal reflux disease without esophagitis  Stable. Continue medication.      Patient has been advised of split billing requirements and indicates understanding: Yes      COUNSELING:  Reviewed preventive health counseling, as reflected in patient instructions       Healthy diet/nutrition        She reports that she has never smoked. She has never used smokeless tobacco.          Carmina Bernal MD  [facility]

## 2023-12-27 ENCOUNTER — PATIENT OUTREACH (OUTPATIENT)
Dept: CARE COORDINATION | Facility: CLINIC | Age: 63
End: 2023-12-27
Payer: MEDICARE

## 2024-01-02 DIAGNOSIS — I21.4 NSTEMI (NON-ST ELEVATED MYOCARDIAL INFARCTION) (H): ICD-10-CM

## 2024-01-02 DIAGNOSIS — I10 ESSENTIAL HYPERTENSION WITH GOAL BLOOD PRESSURE LESS THAN 140/90: ICD-10-CM

## 2024-01-02 DIAGNOSIS — I10 PRIMARY HYPERTENSION: ICD-10-CM

## 2024-01-02 RX ORDER — LISINOPRIL 5 MG/1
5 TABLET ORAL DAILY
Qty: 90 TABLET | Refills: 0 | Status: SHIPPED | OUTPATIENT
Start: 2024-01-02 | End: 2024-04-02

## 2024-01-02 RX ORDER — ATORVASTATIN CALCIUM 40 MG/1
40 TABLET, FILM COATED ORAL DAILY
Qty: 90 TABLET | Refills: 0 | Status: SHIPPED | OUTPATIENT
Start: 2024-01-02 | End: 2024-04-02

## 2024-01-24 ENCOUNTER — PATIENT OUTREACH (OUTPATIENT)
Dept: CARE COORDINATION | Facility: CLINIC | Age: 64
End: 2024-01-24
Payer: MEDICARE

## 2024-01-31 ENCOUNTER — PATIENT OUTREACH (OUTPATIENT)
Dept: GASTROENTEROLOGY | Facility: CLINIC | Age: 64
End: 2024-01-31
Payer: MEDICARE

## 2024-02-14 DIAGNOSIS — E11.9 TYPE 2 DIABETES MELLITUS WITHOUT COMPLICATION, WITHOUT LONG-TERM CURRENT USE OF INSULIN (H): ICD-10-CM

## 2024-02-14 NOTE — TELEPHONE ENCOUNTER
Prescription approved per [facility] Refill Protocol.  Penny Rivera, RN  [facility] Triage Nurse

## 2024-02-20 ENCOUNTER — HOSPITAL ENCOUNTER (OUTPATIENT)
Dept: MAMMOGRAPHY | Facility: CLINIC | Age: 64
Discharge: HOME | End: 2024-02-20
Attending: INTERNAL MEDICINE | Admitting: INTERNAL MEDICINE
Payer: COMMERCIAL

## 2024-02-20 DIAGNOSIS — Z12.31 VISIT FOR SCREENING MAMMOGRAM: ICD-10-CM

## 2024-02-20 PROCEDURE — 77067 SCR MAMMO BI INCL CAD: CPT

## 2024-02-22 ENCOUNTER — TRANSFERRED RECORDS (OUTPATIENT)
Dept: MULTI SPECIALTY CLINIC | Facility: CLINIC | Age: 64
End: 2024-02-22

## 2024-02-22 LAB — RETINOPATHY: NORMAL

## 2024-03-21 ENCOUNTER — MEDICAL CORRESPONDENCE (OUTPATIENT)
Dept: HEALTH INFORMATION MANAGEMENT | Facility: CLINIC | Age: 64
End: 2024-03-21

## 2024-04-02 DIAGNOSIS — I21.4 NSTEMI (NON-ST ELEVATED MYOCARDIAL INFARCTION) (H): ICD-10-CM

## 2024-04-02 DIAGNOSIS — I10 PRIMARY HYPERTENSION: ICD-10-CM

## 2024-04-02 DIAGNOSIS — I10 ESSENTIAL HYPERTENSION WITH GOAL BLOOD PRESSURE LESS THAN 140/90: ICD-10-CM

## 2024-04-02 RX ORDER — LISINOPRIL 5 MG/1
5 TABLET ORAL DAILY
Qty: 90 TABLET | Refills: 0 | Status: SHIPPED | OUTPATIENT
Start: 2024-04-02 | End: 2024-04-26

## 2024-04-02 RX ORDER — ATORVASTATIN CALCIUM 40 MG/1
40 TABLET, FILM COATED ORAL DAILY
Qty: 90 TABLET | Refills: 0 | Status: SHIPPED | OUTPATIENT
Start: 2024-04-02 | End: 2024-04-29

## 2024-04-04 DIAGNOSIS — I21.4 NSTEMI (NON-ST ELEVATED MYOCARDIAL INFARCTION) (H): ICD-10-CM

## 2024-04-04 DIAGNOSIS — I10 PRIMARY HYPERTENSION: ICD-10-CM

## 2024-04-04 RX ORDER — CARVEDILOL 3.12 MG/1
3.12 TABLET ORAL 2 TIMES DAILY WITH MEALS
Qty: 180 TABLET | Refills: 0 | Status: SHIPPED | OUTPATIENT
Start: 2024-04-04 | End: 2024-04-26

## 2024-04-10 DIAGNOSIS — E11.9 TYPE 2 DIABETES MELLITUS WITHOUT COMPLICATION, WITHOUT LONG-TERM CURRENT USE OF INSULIN (H): ICD-10-CM

## 2024-04-10 RX ORDER — SITAGLIPTIN 50 MG/1
50 TABLET, FILM COATED ORAL DAILY
Qty: 90 TABLET | Refills: 0 | Status: SHIPPED | OUTPATIENT
Start: 2024-04-10 | End: 2024-04-29

## 2024-04-10 NOTE — TELEPHONE ENCOUNTER
Prescription approved per [facility] Refill Protocol.  Penny Rivera, RN  [facility] Triage Nurse

## 2024-04-19 NOTE — PROGRESS NOTES
[facility]    Outpatient Physical Therapy Discharge Note  Patient: Rubi Nicolas  : 1960    Patient was seen for 5 visits for LBP from 23 to 23 with no follow up appointments.      Plan:  Discharge from therapy.     Reason for Discharge: Patient has met all goals.  No further expectation of progress.  Patient has failed to schedule further appointments.    Discharge Plan: Patient to continue home program.     If patient would like to return to therapy, they will need a new PT order from referring provider.    Kaela Campbell, PT   2024

## 2024-04-23 ENCOUNTER — TELEPHONE (OUTPATIENT)
Dept: CARDIOLOGY | Facility: CLINIC | Age: 64
End: 2024-04-23
Payer: MEDICARE

## 2024-04-23 DIAGNOSIS — I10 PRIMARY HYPERTENSION: ICD-10-CM

## 2024-04-23 DIAGNOSIS — I21.4 NSTEMI (NON-ST ELEVATED MYOCARDIAL INFARCTION) (H): Primary | ICD-10-CM

## 2024-04-23 DIAGNOSIS — I10 ESSENTIAL HYPERTENSION WITH GOAL BLOOD PRESSURE LESS THAN 140/90: ICD-10-CM

## 2024-04-23 DIAGNOSIS — I25.10 CORONARY ARTERY DISEASE INVOLVING NATIVE CORONARY ARTERY OF NATIVE HEART WITHOUT ANGINA PECTORIS: ICD-10-CM

## 2024-04-23 NOTE — TELEPHONE ENCOUNTER
[facility] Call Center    Phone Message    May a detailed message be left on voicemail: yes     Reason for Call: Order(s): Other:   Reason for requested: Lab orders extend   Date needed: today   Provider name: Dr. Carmona     Please extend lab orders and call pt. Patient is on wait list to see Dr. Carmona.     Action Taken: Other: cardiology     Travel Screening: Not Applicable    Thank you!  [facility]

## 2024-04-26 ENCOUNTER — TELEPHONE (OUTPATIENT)
Dept: FAMILY MEDICINE | Facility: CLINIC | Age: 64
End: 2024-04-26
Payer: MEDICARE

## 2024-04-26 DIAGNOSIS — I21.4 NSTEMI (NON-ST ELEVATED MYOCARDIAL INFARCTION) (H): ICD-10-CM

## 2024-04-26 DIAGNOSIS — I25.10 CORONARY ARTERY DISEASE INVOLVING NATIVE CORONARY ARTERY OF NATIVE HEART WITHOUT ANGINA PECTORIS: ICD-10-CM

## 2024-04-26 DIAGNOSIS — I10 PRIMARY HYPERTENSION: ICD-10-CM

## 2024-04-26 DIAGNOSIS — I10 ESSENTIAL HYPERTENSION WITH GOAL BLOOD PRESSURE LESS THAN 140/90: ICD-10-CM

## 2024-04-26 RX ORDER — LISINOPRIL 5 MG/1
5 TABLET ORAL DAILY
Qty: 90 TABLET | Refills: 0 | Status: SHIPPED | OUTPATIENT
Start: 2024-04-26 | End: 2024-04-29

## 2024-04-26 RX ORDER — CARVEDILOL 3.12 MG/1
3.12 TABLET ORAL 2 TIMES DAILY WITH MEALS
Qty: 180 TABLET | Refills: 0 | Status: SHIPPED | OUTPATIENT
Start: 2024-04-26 | End: 2024-04-29

## 2024-04-26 RX ORDER — EZETIMIBE 10 MG/1
10 TABLET ORAL DAILY
Qty: 90 TABLET | Refills: 0 | Status: SHIPPED | OUTPATIENT
Start: 2024-04-26 | End: 2024-04-29

## 2024-04-26 NOTE — TELEPHONE ENCOUNTER
Received refill request for:  Carvedilol, Zetia, Lisinopril    Future Appointments   Date Time Provider Department Center   4/30/2024  7:45 AM Jordy Carmona MD [facility] Cardiology Refill Guideline reviewed.  Medication meets criteria for refill.    Aurelia Fox RN, BSN  04/26/24 at 9:38 AM

## 2024-04-26 NOTE — TELEPHONE ENCOUNTER
"Patient calling stating: \"There are 3 things.   I am supposed to see Dr Bernal every 6 months.   I have run out of prescriptions.  I am on Medicare and my insurance has changes and wants me to switch fromt [employer] of Skin Sanjuana to the Dexcom.      Writer scheduled patient for medication/DM management.     Appointments in Next Year      Apr 29, 2024  9:30 AM  (Arrive by 9:25 AM)  Provider Visit with Carmina Bernal MD  [facility] [phone]       "

## 2024-04-29 ENCOUNTER — VIRTUAL VISIT (OUTPATIENT)
Dept: FAMILY MEDICINE | Facility: CLINIC | Age: 64
End: 2024-04-29
Payer: MEDICARE

## 2024-04-29 DIAGNOSIS — I10 ESSENTIAL HYPERTENSION WITH GOAL BLOOD PRESSURE LESS THAN 140/90: ICD-10-CM

## 2024-04-29 DIAGNOSIS — I10 PRIMARY HYPERTENSION: ICD-10-CM

## 2024-04-29 DIAGNOSIS — I21.4 NSTEMI (NON-ST ELEVATED MYOCARDIAL INFARCTION) (H): ICD-10-CM

## 2024-04-29 DIAGNOSIS — I25.10 CORONARY ARTERY DISEASE INVOLVING NATIVE CORONARY ARTERY OF NATIVE HEART WITHOUT ANGINA PECTORIS: ICD-10-CM

## 2024-04-29 DIAGNOSIS — E11.9 TYPE 2 DIABETES MELLITUS WITHOUT COMPLICATION, WITHOUT LONG-TERM CURRENT USE OF INSULIN (H): Primary | ICD-10-CM

## 2024-04-29 PROCEDURE — 99214 OFFICE O/P EST MOD 30 MIN: CPT | Mod: 95 | Performed by: INTERNAL MEDICINE

## 2024-04-29 PROCEDURE — G2211 COMPLEX E/M VISIT ADD ON: HCPCS | Performed by: INTERNAL MEDICINE

## 2024-04-29 RX ORDER — EZETIMIBE 10 MG/1
10 TABLET ORAL DAILY
Qty: 90 TABLET | Refills: 3 | Status: SHIPPED | OUTPATIENT
Start: 2024-04-29

## 2024-04-29 RX ORDER — PROCHLORPERAZINE 25 MG/1
SUPPOSITORY RECTAL
Qty: 3 EACH | Refills: 5 | Status: SHIPPED | OUTPATIENT
Start: 2024-04-29 | End: 2024-05-22

## 2024-04-29 RX ORDER — LISINOPRIL 5 MG/1
5 TABLET ORAL DAILY
Qty: 90 TABLET | Refills: 3 | Status: SHIPPED | OUTPATIENT
Start: 2024-04-29

## 2024-04-29 RX ORDER — CARVEDILOL 3.12 MG/1
3.12 TABLET ORAL 2 TIMES DAILY WITH MEALS
Qty: 180 TABLET | Refills: 3 | Status: SHIPPED | OUTPATIENT
Start: 2024-04-29

## 2024-04-29 RX ORDER — PROCHLORPERAZINE 25 MG/1
SUPPOSITORY RECTAL
Qty: 1 EACH | Refills: 1 | Status: SHIPPED | OUTPATIENT
Start: 2024-04-29 | End: 2024-05-22

## 2024-04-29 RX ORDER — ATORVASTATIN CALCIUM 40 MG/1
40 TABLET, FILM COATED ORAL DAILY
Qty: 90 TABLET | Refills: 3 | Status: SHIPPED | OUTPATIENT
Start: 2024-04-29

## 2024-04-29 NOTE — PROGRESS NOTES
Rubi is a 63 year old who is being evaluated via a billable video visit.    How would you like to obtain your AVS? MyChart  If the video visit is dropped, the invitation should be resent by: Text to cell phone: [phone]  Will anyone else be joining your video visit? No    Assessment & Plan     Type 2 diabetes mellitus without complication, without long-term current use of insulin (H)  Stable. Continue medication.  Check A1c, follow up every 6 months.  If A1c outside of goal, will increase januvia to 100 mg daily  - sitagliptin (JANUVIA) 50 MG tablet; Take 1 tablet (50 mg) by mouth daily  - metFORMIN (GLUCOPHAGE) 1000 MG tablet; Take 1 tablet (1,000 mg) by mouth 2 times daily (with meals)  - Continuous Glucose Sensor (DEXCOM G6 SENSOR) MISC; Change every 10 days.  - Continuous Glucose Transmitter (DEXCOM G6 TRANSMITTER) MISC; Change every 3 months.  - Hemoglobin A1c; Future    NSTEMI (non-ST elevated myocardial infarction) (H)  Stable. Continue medication.  - atorvastatin (LIPITOR) 40 MG tablet; Take 1 tablet (40 mg) by mouth daily  - carvedilol (COREG) 3.125 MG tablet; Take 1 tablet (3.125 mg) by mouth 2 times daily (with meals) Appointment needed for further refills  - lisinopril (ZESTRIL) 5 MG tablet; Take 1 tablet (5 mg) by mouth daily    Primary hypertension  Stable. Continue medication.  - atorvastatin (LIPITOR) 40 MG tablet; Take 1 tablet (40 mg) by mouth daily  - carvedilol (COREG) 3.125 MG tablet; Take 1 tablet (3.125 mg) by mouth 2 times daily (with meals) Appointment needed for further refills  - lisinopril (ZESTRIL) 5 MG tablet; Take 1 tablet (5 mg) by mouth daily    Coronary artery disease involving native coronary artery of native heart without angina pectoris  Stable. Continue medication.  - ezetimibe (ZETIA) 10 MG tablet; Take 1 tablet (10 mg) by mouth daily    Essential hypertension with goal blood pressure less than 140/90  Stable. Continue medication.  - lisinopril (ZESTRIL) 5 MG tablet; Take 1  tablet (5 mg) by mouth daily      See Patient Instructions    Subjective   Rubi is a 63 year old, presenting for the following health issues:  Diabetes (Patient is having a virtual video visit for Diabetes Management.  )    Via the Health Maintenance questionnaire, the patient has reported the following services have been completed -Eye Exam, this information has been sent to the abstraction team.    Video Start Time:  9:40 am     Rubi is here for follow up.   She has DM2, most recent A1c within goal.   BG readings at home: she has a CGM and readings have been either too low or too high, possibly malfunctioning sensor? She is switching to dexcom due to insurance.   Fasting BG is around 120       History of Present Illness       Diabetes:   She presents for follow up of diabetes.   She is checking home blood glucose with a continuous glucose monitor.   She checks blood glucose before meals, after meals, before and after meals and at bedtime.  Blood glucose is sometimes over 200 and never under 70. She is aware of hypoglycemia symptoms including shakiness and other.   She is concerned about frequent infections, blood sugar frequently over 200, low blood sugar, several less than 70 in the past few weeks and other.    She is not experiencing numbness or burning in feet, excessive thirst, blurry vision, weight changes or redness, sores or blisters on feet. The patient has had a diabetic eye exam in the last 12 months. Eye exam performed on 2/22/2024. Location of last eye exam [facility] [place]..        She eats 2-3 servings of fruits and vegetables daily.She consumes 0 sweetened beverage(s) daily.She exercises with enough effort to increase her heart rate 30 to 60 minutes per day.  She exercises with enough effort to increase her heart rate 7 days per week.   She is taking medications regularly.         Objective       Vitals:  No vitals were obtained today due to virtual visit.    Physical Exam   GEN: No acute  distress  RESP: No audible increased work of breathing. Patient speaking in full sentences without distress.  PSYCH: pleasant  Exam otherwise limited due to virtual platform        Video-Visit Details    Type of service:  Video Visit   Video End Time: 10 am   Originating Location (pt. Location): Home  Distant Location (provider location):  On-site  Platform used for Video Visit: Julito  Signed Electronically by: Carmina Bernal MD

## 2024-04-30 ENCOUNTER — OFFICE VISIT (OUTPATIENT)
Dept: CARDIOLOGY | Facility: CLINIC | Age: 64
End: 2024-04-30
Payer: MEDICARE

## 2024-04-30 VITALS
OXYGEN SATURATION: 98 % | WEIGHT: 132.8 LBS | DIASTOLIC BLOOD PRESSURE: 68 MMHG | HEIGHT: 63 IN | BODY MASS INDEX: 23.53 KG/M2 | HEART RATE: 55 BPM | SYSTOLIC BLOOD PRESSURE: 104 MMHG

## 2024-04-30 DIAGNOSIS — I10 ESSENTIAL HYPERTENSION WITH GOAL BLOOD PRESSURE LESS THAN 140/90: ICD-10-CM

## 2024-04-30 DIAGNOSIS — I25.10 CORONARY ARTERY DISEASE INVOLVING NATIVE CORONARY ARTERY OF NATIVE HEART WITHOUT ANGINA PECTORIS: ICD-10-CM

## 2024-04-30 DIAGNOSIS — I21.4 NSTEMI (NON-ST ELEVATED MYOCARDIAL INFARCTION) (H): ICD-10-CM

## 2024-04-30 PROCEDURE — 99214 OFFICE O/P EST MOD 30 MIN: CPT | Performed by: INTERNAL MEDICINE

## 2024-04-30 NOTE — LETTER
4/30/2024    Carmina Bernal MD  [address] [place] [address]    RE: Rubi Nicolas       Dear Colleague,     I had the pleasure of seeing Rubi Nicolas in the [facility].  HPI and Plan:   Ms Nicolas is a very pleasant 63-year-old female who I saw in February 2022 when she was admitted with myocardial infarction and underwent successful PCI of the mid LAD.  Subsequently she had a vasovagal event leading to bradycardia and was seen by electrophysiology and no intervention was indicated and was subsequently started on low-dose beta-blocker and did well.  Today she is coming for routine follow-up.  Echocardiogram showed normal LV function at the time of MI.  She has done well from cardiac standpoint since that time.  She is a avid hiker, walks at least 3 to 4 miles every day can go uphill and climb stairs without any exertional symptoms.  She is on low-dose lisinopril carvedilol, Lipitor 40 mg daily, Zetia.  Lipid panel checked in December shows LDL well-controlled at 51.  She does not use any tobacco.  She denies any dizziness presyncope syncope.       Assessment and plan  1.  CAD with history of MI in February 2022 s/p mid LAD PCI.  No significant coronary disease elsewhere.  Clinically doing well without any anginal symptoms.  2.  LDL well controlled on Zetia and Lipitor  3.  Hypertension well-controlled on lisinopril and carvedilol      Recommendations  Overall cardiac status wise she is doing well without any symptoms concerning for angina or congestive heart failure  Cardiac auscultation was benign today  Continue current cardiac medications and follow-up in a year, sooner if she notes any change in clinical status.          Orders Placed This Encounter   Procedures    Follow-Up with Cardiology       No orders of the defined types were placed in this encounter.      There are no discontinued medications.      Encounter Diagnoses   Name Primary?    NSTEMI (non-ST elevated  myocardial infarction) (H)     Essential hypertension with goal blood pressure less than 140/90     Coronary artery disease involving native coronary artery of native heart without angina pectoris        CURRENT MEDICATIONS:  Current Outpatient Medications   Medication Sig Dispense Refill    aspirin (ASA) 81 MG EC tablet Take 1 tablet (81 mg) by mouth daily 90 tablet 3    atorvastatin (LIPITOR) 40 MG tablet Take 1 tablet (40 mg) by mouth daily 90 tablet 3    carvedilol (COREG) 3.125 MG tablet Take 1 tablet (3.125 mg) by mouth 2 times daily (with meals) Appointment needed for further refills 180 tablet 3    Coenzyme Q10 (CO Q 10 PO)       Continuous Blood Gluc  (FREESTYLE ADRIANA 14 DAY READER) ROSALIE USE 1 DEVICE CONTINUOUSLY 1 each 0    Continuous Blood Gluc Sensor (EBDSoftSTYLE ADRIANA 14 DAY SENSOR) MISC USE ONE EVERY 14 DAYS 2 each 11    Continuous Glucose Sensor (DEXCOM G6 SENSOR) MISC Change every 10 days. 3 each 5    Continuous Glucose Transmitter (DEXCOM G6 TRANSMITTER) MISC Change every 3 months. 1 each 1    ezetimibe (ZETIA) 10 MG tablet Take 1 tablet (10 mg) by mouth daily 90 tablet 3    lisinopril (ZESTRIL) 5 MG tablet Take 1 tablet (5 mg) by mouth daily 90 tablet 3    metFORMIN (GLUCOPHAGE) 1000 MG tablet Take 1 tablet (1,000 mg) by mouth 2 times daily (with meals) 180 tablet 3    Microlet Lancets MISC 1 lancet daily 100 each 4    Multiple Vitamin (MULTIVITAMIN OR) Take 0.5 tablets by mouth daily      nitroGLYcerin (NITROSTAT) 0.4 MG sublingual tablet For chest pain place 1 tablet under the tongue every 5 minutes for 3 doses. If symptoms persist 5 minutes after 1st dose call 911. 25 tablet 11    omeprazole (PRILOSEC) 20 MG DR capsule Take 20 mg by mouth twice a week Monday, Thursday      sitagliptin (JANUVIA) 50 MG tablet Take 1 tablet (50 mg) by mouth daily 90 tablet 3       ALLERGIES     Allergies   Allergen Reactions    Keflex [Cephalexin Hcl] Hives    Penicillins Hives    Ragweeds      Sneezing etc.        PAST MEDICAL HISTORY:  Past Medical History:   Diagnosis Date    Atypical squamous cells of undetermined significance (ASCUS) on Papanicolaou smear of cervix 10/9/2015    GERD (gastroesophageal reflux disease)     HTN (hypertension)     Hyperlipidemia     Type 2 diabetes mellitus without complications (H) 10/22/2015       PAST SURGICAL HISTORY:  Past Surgical History:   Procedure Laterality Date    APPENDECTOMY  1976    at time of USO    ARTHROSCOPY KNEE RT/LT      COLONOSCOPY N/A 11/9/2015    Procedure: COLONOSCOPY;  Surgeon: Kate Redmond MD;  Location:  GI    COLONOSCOPY N/A 2/14/2022    Procedure: COLONOSCOPY;  Surgeon: Kate Redmond MD;  Location:  GI    CV HEART CATHETERIZATION WITH POSSIBLE INTERVENTION N/A 2/28/2022    Procedure: Heart Catheterization with Possible Intervention;  Surgeon: Jh Powell MD;  Location:  HEART CARDIAC CATH LAB    [facility] LAP OVARIAN CYSTOTOMY  1976    salpingooopherectomy for large dermoid       FAMILY HISTORY:  Family History   Problem Relation Age of Onset    Diabetes Mother 60        type 2    Hypertension Mother     Dementia Mother 90    Heart Disease Mother     Hyperlipidemia Mother     Hypertension Father     Cerebrovascular Disease Father     Brain Cancer Father 68    Hyperlipidemia Father     Hypertension Sister     Diabetes Maternal Uncle         type 1       SOCIAL HISTORY:  Social History     Socioeconomic History    Marital status:      Spouse name: None    Number of children: None    Years of education: None    Highest education level: None   Tobacco Use    Smoking status: Never    Smokeless tobacco: Never   Vaping Use    Vaping status: Never Used   Substance and Sexual Activity    Alcohol use: No     Alcohol/week: 0.0 standard drinks of alcohol    Drug use: No    Sexual activity: Yes     Partners: Male     Birth control/protection: Post-menopausal   Other Topics Concern    Parent/sibling w/ CABG, MI or angioplasty before 65F  55M? 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    ETOH- none Exercise-  1 hr x 5 /week- Walks brisk, CC sk     Social Determinants of Health     Financial Resource Strain: Low Risk  (12/6/2023)    Financial Resource Strain     Within the past 12 months, have you or your family members you live with been unable to get utilities (heat, electricity) when it was really needed?: No   Food Insecurity: Low Risk  (12/6/2023)    Food Insecurity     Within the past 12 months, did you worry that your food would run out before you got money to buy more?: No     Within the past 12 months, did the food you bought just not last and you didn t have money to get more?: No   Transportation Needs: Low Risk  (12/6/2023)    Transportation Needs     Within the past 12 months, has lack of transportation kept you from medical appointments, getting your medicines, non-medical meetings or appointments, work, or from getting things that you need?: No   Interpersonal Safety: Low Risk  (12/6/2023)    Interpersonal Safety     Do you feel physically and emotionally safe where you currently live?: Yes     Within the past 12 months, have you been hit, slapped, kicked or otherwise physically hurt by someone?: No     Within the past 12 months, have you been humiliated or emotionally abused in other ways by your partner or ex-partner?: No   Housing Stability: Low Risk  (12/6/2023)    Housing Stability     Do you have housing? : Yes     Are you worried about losing your housing?: No       Review of Systems:  Skin:          Eyes:         ENT:         Respiratory:  Negative       Cardiovascular:  Negative      Gastroenterology:        Genitourinary:         Musculoskeletal:         Neurologic:         Psychiatric:         Heme/Lymph/Imm:         Endocrine:           Physical Exam:  Vitals: /68 (BP Location: Right arm, Patient Position: Sitting,  "Cuff Size: Adult Regular)   Pulse 55   Ht 1.588 m (5' 2.5\")   Wt 60.2 kg (132 lb 12.8 oz)   SpO2 98%   BMI 23.90 kg/m      General patient appears comfortable  Neck normal JVP  Cardiovascular system S1-S2 normal no murmur rub or gallop  Respiratory system clear to auscultation  Extremities no edema    CC  Jordy Carmona MD  [address]  [place] [address]        Thank you for allowing me to participate in the care of your patient.      Sincerely,     Jordy Carmona MD     [facility]  "

## 2024-04-30 NOTE — PROGRESS NOTES
HPI and Plan:   Ms Nicolas is a very pleasant 63-year-old female who I saw in February 2022 when she was admitted with myocardial infarction and underwent successful PCI of the mid LAD.  Subsequently she had a vasovagal event leading to bradycardia and was seen by electrophysiology and no intervention was indicated and was subsequently started on low-dose beta-blocker and did well.  Today she is coming for routine follow-up.  Echocardiogram showed normal LV function at the time of MI.  She has done well from cardiac standpoint since that time.  She is a avid hiker, walks at least 3 to 4 miles every day can go uphill and climb stairs without any exertional symptoms.  She is on low-dose lisinopril carvedilol, Lipitor 40 mg daily, Zetia.  Lipid panel checked in December shows LDL well-controlled at 51.  She does not use any tobacco.  She denies any dizziness presyncope syncope.       Assessment and plan  1.  CAD with history of MI in February 2022 s/p mid LAD PCI.  No significant coronary disease elsewhere.  Clinically doing well without any anginal symptoms.  2.  LDL well controlled on Zetia and Lipitor  3.  Hypertension well-controlled on lisinopril and carvedilol      Recommendations  Overall cardiac status wise she is doing well without any symptoms concerning for angina or congestive heart failure  Cardiac auscultation was benign today  Continue current cardiac medications and follow-up in a year, sooner if she notes any change in clinical status.          Orders Placed This Encounter   Procedures    Follow-Up with Cardiology       No orders of the defined types were placed in this encounter.      There are no discontinued medications.      Encounter Diagnoses   Name Primary?    NSTEMI (non-ST elevated myocardial infarction) (H)     Essential hypertension with goal blood pressure less than 140/90     Coronary artery disease involving native coronary artery of native heart without angina pectoris        CURRENT  MEDICATIONS:  Current Outpatient Medications   Medication Sig Dispense Refill    aspirin (ASA) 81 MG EC tablet Take 1 tablet (81 mg) by mouth daily 90 tablet 3    atorvastatin (LIPITOR) 40 MG tablet Take 1 tablet (40 mg) by mouth daily 90 tablet 3    carvedilol (COREG) 3.125 MG tablet Take 1 tablet (3.125 mg) by mouth 2 times daily (with meals) Appointment needed for further refills 180 tablet 3    Coenzyme Q10 (CO Q 10 PO)       Continuous Blood Gluc  (FREESTYLE ADRIANA 14 DAY READER) ROSALIE USE 1 DEVICE CONTINUOUSLY 1 each 0    Continuous Blood Gluc Sensor (FREESTYLE ADRIANA 14 DAY SENSOR) MISC USE ONE EVERY 14 DAYS 2 each 11    Continuous Glucose Sensor (DEXCOM G6 SENSOR) MISC Change every 10 days. 3 each 5    Continuous Glucose Transmitter (DEXCOM G6 TRANSMITTER) MISC Change every 3 months. 1 each 1    ezetimibe (ZETIA) 10 MG tablet Take 1 tablet (10 mg) by mouth daily 90 tablet 3    lisinopril (ZESTRIL) 5 MG tablet Take 1 tablet (5 mg) by mouth daily 90 tablet 3    metFORMIN (GLUCOPHAGE) 1000 MG tablet Take 1 tablet (1,000 mg) by mouth 2 times daily (with meals) 180 tablet 3    Microlet Lancets MISC 1 lancet daily 100 each 4    Multiple Vitamin (MULTIVITAMIN OR) Take 0.5 tablets by mouth daily      nitroGLYcerin (NITROSTAT) 0.4 MG sublingual tablet For chest pain place 1 tablet under the tongue every 5 minutes for 3 doses. If symptoms persist 5 minutes after 1st dose call 911. 25 tablet 11    omeprazole (PRILOSEC) 20 MG DR capsule Take 20 mg by mouth twice a week Monday, Thursday      sitagliptin (JANUVIA) 50 MG tablet Take 1 tablet (50 mg) by mouth daily 90 tablet 3       ALLERGIES     Allergies   Allergen Reactions    Keflex [Cephalexin Hcl] Hives    Penicillins Hives    Ragweeds      Sneezing etc.       PAST MEDICAL HISTORY:  Past Medical History:   Diagnosis Date    Atypical squamous cells of undetermined significance (ASCUS) on Papanicolaou smear of cervix 10/9/2015    GERD (gastroesophageal reflux  disease)     HTN (hypertension)     Hyperlipidemia     Type 2 diabetes mellitus without complications (H) 10/22/2015       PAST SURGICAL HISTORY:  Past Surgical History:   Procedure Laterality Date    APPENDECTOMY  1976    at time of USO    ARTHROSCOPY KNEE RT/LT      COLONOSCOPY N/A 11/9/2015    Procedure: COLONOSCOPY;  Surgeon: Kate Redmond MD;  Location:  GI    COLONOSCOPY N/A 2/14/2022    Procedure: COLONOSCOPY;  Surgeon: Kate Redmond MD;  Location:  GI    CV HEART CATHETERIZATION WITH POSSIBLE INTERVENTION N/A 2/28/2022    Procedure: Heart Catheterization with Possible Intervention;  Surgeon: Jh Powell MD;  Location:  HEART CARDIAC CATH LAB    Z LAP OVARIAN CYSTOTOMY  1976    salpingooopherectomy for large dermoid       FAMILY HISTORY:  Family History   Problem Relation Age of Onset    Diabetes Mother 60        type 2    Hypertension Mother     Dementia Mother 90    Heart Disease Mother     Hyperlipidemia Mother     Hypertension Father     Cerebrovascular Disease Father     Brain Cancer Father 68    Hyperlipidemia Father     Hypertension Sister     Diabetes Maternal Uncle         type 1       SOCIAL HISTORY:  Social History     Socioeconomic History    Marital status:      Spouse name: None    Number of children: None    Years of education: None    Highest education level: None   Tobacco Use    Smoking status: Never    Smokeless tobacco: Never   Vaping Use    Vaping status: Never Used   Substance and Sexual Activity    Alcohol use: No     Alcohol/week: 0.0 standard drinks of alcohol    Drug use: No    Sexual activity: Yes     Partners: Male     Birth control/protection: Post-menopausal   Other Topics Concern    Parent/sibling w/ CABG, MI or angioplasty before 65F 55M? 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    ETOH- none Exercise-  1 hr x 5 /week- Walks brisk, CC  "sk     Social Determinants of Health     Financial Resource Strain: Low Risk  (12/6/2023)    Financial Resource Strain     Within the past 12 months, have you or your family members you live with been unable to get utilities (heat, electricity) when it was really needed?: No   Food Insecurity: Low Risk  (12/6/2023)    Food Insecurity     Within the past 12 months, did you worry that your food would run out before you got money to buy more?: No     Within the past 12 months, did the food you bought just not last and you didn t have money to get more?: No   Transportation Needs: Low Risk  (12/6/2023)    Transportation Needs     Within the past 12 months, has lack of transportation kept you from medical appointments, getting your medicines, non-medical meetings or appointments, work, or from getting things that you need?: No   Interpersonal Safety: Low Risk  (12/6/2023)    Interpersonal Safety     Do you feel physically and emotionally safe where you currently live?: Yes     Within the past 12 months, have you been hit, slapped, kicked or otherwise physically hurt by someone?: No     Within the past 12 months, have you been humiliated or emotionally abused in other ways by your partner or ex-partner?: No   Housing Stability: Low Risk  (12/6/2023)    Housing Stability     Do you have housing? : Yes     Are you worried about losing your housing?: No       Review of Systems:  Skin:          Eyes:         ENT:         Respiratory:  Negative       Cardiovascular:  Negative      Gastroenterology:        Genitourinary:         Musculoskeletal:         Neurologic:         Psychiatric:         Heme/Lymph/Imm:         Endocrine:           Physical Exam:  Vitals: /68 (BP Location: Right arm, Patient Position: Sitting, Cuff Size: Adult Regular)   Pulse 55   Ht 1.588 m (5' 2.5\")   Wt 60.2 kg (132 lb 12.8 oz)   SpO2 98%   BMI 23.90 kg/m      General patient appears comfortable  Neck normal JVP  Cardiovascular system S1-S2 " normal no murmur rub or gallop  Respiratory system clear to auscultation  Extremities no edema      CC  Jordy Carmona MD  [address]  [place] [address]

## 2024-05-02 ENCOUNTER — LAB (OUTPATIENT)
Dept: LAB | Facility: CLINIC | Age: 64
End: 2024-05-02
Payer: MEDICARE

## 2024-05-02 DIAGNOSIS — E11.9 TYPE 2 DIABETES MELLITUS WITHOUT COMPLICATION, WITHOUT LONG-TERM CURRENT USE OF INSULIN (H): ICD-10-CM

## 2024-05-02 LAB — HBA1C MFR BLD: 7 % (ref 0–5.6)

## 2024-05-02 PROCEDURE — 36415 COLL VENOUS BLD VENIPUNCTURE: CPT

## 2024-05-02 PROCEDURE — 83036 HEMOGLOBIN GLYCOSYLATED A1C: CPT

## 2024-05-03 NOTE — RESULT ENCOUNTER NOTE
George Venegas,     This is Jh Ulloa and I am a PA who is covering for Dr. Bernal who is currently out of the office. I had a chance to review your recent results.  A1c is at 7.0%.  Up from 6.7% 4 months ago.  I would continue on the same dose of the metformin as well as the Januvia for now.  Goal is to continue to keep you under 7%.  Let's repeat this test in ~6 months.  Continue healthy diet and exercise!     Let me know if you have any questions or concerns,     Jh Ulloa PA-C  [facility]

## 2024-05-14 NOTE — PROGRESS NOTES
SUBJECTIVE:                                                   Rubi Nicolas is a 63 year old female who presents to clinic today for the following health issue(s):  Patient presents with:  Vaginal Problem: Bleeding/ spotting/ brownish discharge for at least week.        HPI:  Patient has noticed brownish colored discharge for about a week. Discharge is a very small amount. Denies fevers, chills, N/V, odor, itching, burning.  Notes she has pain with intercourse and has vaginal dryness.    Hx of MI.     No LMP recorded. Patient is postmenopausal..     Patient is not sexually active, .  Using menopause for contraception.    reports that she has never smoked. She has never used smokeless tobacco.    STD testing offered?  Declined - not sexually active    Health maintenance updated:  yes    Overdue          2024 COVID-19 Vaccine ( season)  Last completed: Oct 11, 2023       Today's PHQ-2 Score:       2024     9:08 AM   PHQ-2 (  [employer])   Q1: Little interest or pleasure in doing things 0   Q2: Feeling down, depressed or hopeless 0   PHQ-2 Score 0     Today's PHQ-9 Score:       2024     9:08 AM   PHQ-9 SCORE   PHQ-9 Total Score 0     Today's JOSÉ MIGUEL-7 Score:       2024     9:08 AM   JOSÉ MIGUEL-7 SCORE   Total Score 0       Problem list and histories reviewed & adjusted, as indicated.  Additional history: as documented.    Patient Active Problem List   Diagnosis    HTN (hypertension)    Hyperlipidemia LDL goal <70    GERD (gastroesophageal reflux disease)    Overweight (BMI 25.0-29.9)    Abnormal AST and ALT    Obesity    Atypical squamous cells of undetermined significance (ASCUS) on Papanicolaou smear of cervix    Type 2 diabetes mellitus without complications (H)    Elevated troponin    CAD (coronary artery disease)    Perineal abscess    Annual physical exam    Screening for HIV (human immunodeficiency virus)    Cervical cancer screening    Dizziness    Black hairy tongue     Diarrhea, unspecified type    Chronic bilateral low back pain with bilateral sciatica    NSTEMI (non-ST elevated myocardial infarction) (H)    Essential hypertension with goal blood pressure less than 140/90     Past Surgical History:   Procedure Laterality Date    APPENDECTOMY  1976    at time of USO    ARTHROSCOPY KNEE RT/LT      COLONOSCOPY N/A 11/9/2015    Procedure: COLONOSCOPY;  Surgeon: Kate Redmond MD;  Location:  GI    COLONOSCOPY N/A 2/14/2022    Procedure: COLONOSCOPY;  Surgeon: Kate Redmond MD;  Location:  GI    CV HEART CATHETERIZATION WITH POSSIBLE INTERVENTION N/A 2/28/2022    Procedure: Heart Catheterization with Possible Intervention;  Surgeon: Jh Powell MD;  Location:  HEART CARDIAC CATH LAB    [facility] LAP OVARIAN CYSTOTOMY  1976    salpingooopherectomy for large dermoid      Social History     Tobacco Use    Smoking status: Never    Smokeless tobacco: Never   Substance Use Topics    Alcohol use: No     Alcohol/week: 0.0 standard drinks of alcohol      Problem (# of Occurrences) Relation (Name,Age of Onset)    Dementia (1) Mother (Naima, 90)    Diabetes (2) Mother (Naima, 60): type 2, Maternal Uncle: type 1    Heart Disease (1) Mother (Naima)    Hypertension (3) Mother (Naima), Father (Don), Sister    Cerebrovascular Disease (1) Father (Don)    Hyperlipidemia (2) Mother (Naima), Father (Don)    Brain Cancer (1) Father (Don, 68)              Current Outpatient Medications   Medication Sig Dispense Refill    aspirin (ASA) 81 MG EC tablet Take 1 tablet (81 mg) by mouth daily 90 tablet 3    atorvastatin (LIPITOR) 40 MG tablet Take 1 tablet (40 mg) by mouth daily 90 tablet 3    carvedilol (COREG) 3.125 MG tablet Take 1 tablet (3.125 mg) by mouth 2 times daily (with meals) Appointment needed for further refills 180 tablet 3    Coenzyme Q10 (CO Q 10 PO)       Continuous Blood Gluc  (BaetaE 14 DAY READER) ROSALIE USE 1 DEVICE CONTINUOUSLY 1 each 0    Continuous  "Blood Gluc Sensor (FREESTYLE ADRIANA 14 DAY SENSOR) MISC USE ONE EVERY 14 DAYS 2 each 11    Continuous Glucose Sensor (DEXCOM G6 SENSOR) MISC Change every 10 days. 3 each 5    Continuous Glucose Transmitter (DEXCOM G6 TRANSMITTER) MISC Change every 3 months. 1 each 1    ezetimibe (ZETIA) 10 MG tablet Take 1 tablet (10 mg) by mouth daily 90 tablet 3    lisinopril (ZESTRIL) 5 MG tablet Take 1 tablet (5 mg) by mouth daily 90 tablet 3    metFORMIN (GLUCOPHAGE) 1000 MG tablet Take 1 tablet (1,000 mg) by mouth 2 times daily (with meals) 180 tablet 3    Microlet Lancets MISC 1 lancet daily 100 each 4    Multiple Vitamin (MULTIVITAMIN OR) Take 0.5 tablets by mouth daily      nitroGLYcerin (NITROSTAT) 0.4 MG sublingual tablet For chest pain place 1 tablet under the tongue every 5 minutes for 3 doses. If symptoms persist 5 minutes after 1st dose call 911. 25 tablet 11    omeprazole (PRILOSEC) 20 MG DR capsule Take 20 mg by mouth twice a week Monday, Thursday      sitagliptin (JANUVIA) 50 MG tablet Take 1 tablet (50 mg) by mouth daily 90 tablet 3    vitamin B-Complex Take 1 tablet by mouth daily       No current facility-administered medications for this visit.     Allergies   Allergen Reactions    Keflex [Cephalexin Hcl] Hives    Penicillins Hives    Ragweeds      Sneezing etc.    Cephalosporins Rash         OBJECTIVE:     /62 (BP Location: Right arm)   Ht 1.588 m (5' 2.5\")   Wt 59.9 kg (132 lb)   BMI 23.76 kg/m    Body mass index is 23.76 kg/m .    Exam:  Constitutional:  Appearance: Well nourished, well developed alert, in no acute distress  Psychiatric:  Mentation appears normal and affect normal/bright.  Pelvic Exam:  External Genitalia:     Normal appearance for age, no tenderness present, no inflammatory lesions present, color normal  Vagina:     Normal vaginal vault without central or paravaginal defects, ATROPHIC- very thin and dry. Green/brown discharge, creamy  Bladder:     Nontender to " palpation  Urethra:   Urethral Body:  Urethra palpation normal, urethra structural support normal   Urethral Meatus:  No erythema or lesions present  Cervix:     Appearance healthy, no lesions present, nontender to palpation, no bleeding present  Uterus:     Nontender to palpation, no masses present, position anteflexed, mobility: normal  Adnexa:     No adnexal tenderness present, no adnexal masses present  Perineum:     Perineum within normal limits, no evidence of trauma, no rashes or skin lesions present  Inguinal Lymph Nodes:     No lymphadenopathy present     In-Clinic Test Results:  No results found for this or any previous visit (from the past 24 hour(s)).    ASSESSMENT/PLAN:                                                        ICD-10-CM    1. Vaginal discharge  N89.8 US Transvaginal Pelvic Non-OB     Multiplex Vaginal Panel by PCR     Multiplex Vaginal Panel by PCR     [place] PELVIC EXAMINATION      2. Vaginal atrophy  N95.2       3. Female dyspareunia  N94.10           Vaginal discharge:  -patient reports brown discharge, discharge is green on exam  -determine between infection vs PMB  -MVP sent  -recommend pelvic US    Vaginal atrophy:  -if US is normal may start low dose vaginal estrogen cream     BRNADON [facility] [place]

## 2024-05-16 ENCOUNTER — OFFICE VISIT (OUTPATIENT)
Dept: OBGYN | Facility: CLINIC | Age: 64
End: 2024-05-16
Payer: MEDICARE

## 2024-05-16 VITALS
DIASTOLIC BLOOD PRESSURE: 62 MMHG | HEIGHT: 63 IN | WEIGHT: 132 LBS | BODY MASS INDEX: 23.39 KG/M2 | SYSTOLIC BLOOD PRESSURE: 116 MMHG

## 2024-05-16 DIAGNOSIS — N94.10 FEMALE DYSPAREUNIA: ICD-10-CM

## 2024-05-16 DIAGNOSIS — N89.8 VAGINAL DISCHARGE: Primary | ICD-10-CM

## 2024-05-16 DIAGNOSIS — N95.2 VAGINAL ATROPHY: ICD-10-CM

## 2024-05-16 LAB
BACTERIAL VAGINOSIS VAG-IMP: NEGATIVE
CANDIDA DNA VAG QL NAA+PROBE: NOT DETECTED
CANDIDA GLABRATA / CANDIDA KRUSEI DNA: NOT DETECTED
T VAGINALIS DNA VAG QL NAA+PROBE: NOT DETECTED

## 2024-05-16 PROCEDURE — 99459 PELVIC EXAMINATION: CPT

## 2024-05-16 PROCEDURE — 99213 OFFICE O/P EST LOW 20 MIN: CPT

## 2024-05-16 PROCEDURE — 0352U MULTIPLEX VAGINAL PANEL BY PCR: CPT

## 2024-05-16 ASSESSMENT — ANXIETY QUESTIONNAIRES
IF YOU CHECKED OFF ANY PROBLEMS ON THIS QUESTIONNAIRE, HOW DIFFICULT HAVE THESE PROBLEMS MADE IT FOR YOU TO DO YOUR WORK, TAKE CARE OF THINGS AT HOME, OR GET ALONG WITH OTHER PEOPLE: NOT DIFFICULT AT ALL
GAD7 TOTAL SCORE: 0
GAD7 TOTAL SCORE: 0
6. BECOMING EASILY ANNOYED OR IRRITABLE: NOT AT ALL
2. NOT BEING ABLE TO STOP OR CONTROL WORRYING: NOT AT ALL
7. FEELING AFRAID AS IF SOMETHING AWFUL MIGHT HAPPEN: NOT AT ALL
1. FEELING NERVOUS, ANXIOUS, OR ON EDGE: NOT AT ALL
3. WORRYING TOO MUCH ABOUT DIFFERENT THINGS: NOT AT ALL
5. BEING SO RESTLESS THAT IT IS HARD TO SIT STILL: NOT AT ALL

## 2024-05-16 ASSESSMENT — PATIENT HEALTH QUESTIONNAIRE - PHQ9
5. POOR APPETITE OR OVEREATING: NOT AT ALL
SUM OF ALL RESPONSES TO PHQ QUESTIONS 1-9: 0

## 2024-05-21 ENCOUNTER — MYC MEDICAL ADVICE (OUTPATIENT)
Dept: FAMILY MEDICINE | Facility: CLINIC | Age: 64
End: 2024-05-21
Payer: MEDICARE

## 2024-05-21 DIAGNOSIS — E11.9 TYPE 2 DIABETES MELLITUS WITHOUT COMPLICATION, WITHOUT LONG-TERM CURRENT USE OF INSULIN (H): ICD-10-CM

## 2024-05-22 DIAGNOSIS — K21.9 GASTROESOPHAGEAL REFLUX DISEASE WITHOUT ESOPHAGITIS: Primary | ICD-10-CM

## 2024-05-22 RX ORDER — PROCHLORPERAZINE 25 MG/1
SUPPOSITORY RECTAL
Qty: 3 EACH | Refills: 5 | Status: SHIPPED | OUTPATIENT
Start: 2024-05-22

## 2024-05-22 RX ORDER — PROCHLORPERAZINE 25 MG/1
SUPPOSITORY RECTAL
Qty: 1 EACH | Refills: 1 | Status: SHIPPED | OUTPATIENT
Start: 2024-05-22

## 2024-05-22 NOTE — TELEPHONE ENCOUNTER
Dr. Bernal,    Please see pt MyChart message and advise.   Scripts re-pended with note to the pharmacy - please review as writer unsure if this is the best approach.     Thank you,  Ammy Alan RN

## 2024-05-22 NOTE — PROGRESS NOTES
INDICATIONS:                                                    Is a pregnancy test required: No.  Was a consent obtained?  Yes    Having endometrial biopsy for post-menopausal bleeding. Pelvic US Endometrial lining 4.42. Patient had similar episode last year and tested positive for GBS.     Today's PHQ-2 Score:       5/16/2024     9:08 AM   PHQ-2 ( 1999 [employer])   Q1: Little interest or pleasure in doing things 0   Q2: Feeling down, depressed or hopeless 0   PHQ-2 Score 0       PROCEDURE;                                                      A speculum was placed in the vagina and cervix prepped with betadine. A tenaculum was attached to the cervix. A small plastic 5 mm Pipelle syringe curette was inserted into the cervical canal. The uterus was sounded to 7.5 cm's. A vigorous four quadrant biopsy was performed, removing amount scant  of tissue. The speculum was removed. This tissue was placed in Formalin and sent to pathology.    Passed pipelle twice to ensure adequate sample, small sample.    The patient tolerated the procedure  well and she reported there was  cramping.      POST PROCEDURE;                                                      There  was  cramping at the time of discharge. She  tolerated the procedure well. There were no complications. Patient was discharged in stable condition.    Patient advised to call the clinic if severe pelvic pain, fever or heavy bleeding.    Simona Morejon PA-C

## 2024-05-23 ENCOUNTER — ANCILLARY PROCEDURE (OUTPATIENT)
Dept: ULTRASOUND IMAGING | Facility: CLINIC | Age: 64
End: 2024-05-23
Payer: MEDICARE

## 2024-05-23 ENCOUNTER — OFFICE VISIT (OUTPATIENT)
Dept: OBGYN | Facility: CLINIC | Age: 64
End: 2024-05-23
Payer: MEDICARE

## 2024-05-23 VITALS
SYSTOLIC BLOOD PRESSURE: 122 MMHG | WEIGHT: 134 LBS | BODY MASS INDEX: 23.74 KG/M2 | HEIGHT: 63 IN | DIASTOLIC BLOOD PRESSURE: 68 MMHG

## 2024-05-23 DIAGNOSIS — N89.8 VAGINAL DISCHARGE: ICD-10-CM

## 2024-05-23 DIAGNOSIS — R93.89 THICKENED ENDOMETRIUM: Primary | ICD-10-CM

## 2024-05-23 PROCEDURE — 58100 BIOPSY OF UTERUS LINING: CPT

## 2024-05-23 PROCEDURE — 76830 TRANSVAGINAL US NON-OB: CPT | Performed by: OBSTETRICS & GYNECOLOGY

## 2024-05-23 PROCEDURE — 88305 TISSUE EXAM BY PATHOLOGIST: CPT | Performed by: PATHOLOGY

## 2024-05-23 PROCEDURE — 87653 STREP B DNA AMP PROBE: CPT

## 2024-05-23 RX ORDER — IBUPROFEN 200 MG
800 TABLET ORAL ONCE
Status: COMPLETED | OUTPATIENT
Start: 2024-05-23 | End: 2024-05-23

## 2024-05-23 RX ADMIN — Medication 800 MG: at 13:56

## 2024-05-24 LAB — GP B STREP DNA SPEC QL NAA+PROBE: NEGATIVE

## 2024-05-28 DIAGNOSIS — N95.2 VAGINAL ATROPHY: Primary | ICD-10-CM

## 2024-05-28 LAB
PATH REPORT.COMMENTS IMP SPEC: NORMAL
PATH REPORT.COMMENTS IMP SPEC: NORMAL
PATH REPORT.FINAL DX SPEC: NORMAL
PATH REPORT.GROSS SPEC: NORMAL
PATH REPORT.MICROSCOPIC SPEC OTHER STN: NORMAL
PATH REPORT.RELEVANT HX SPEC: NORMAL
PHOTO IMAGE: NORMAL

## 2024-05-28 RX ORDER — ESTRADIOL 0.1 MG/G
1 CREAM VAGINAL
Qty: 1 G | Refills: 1 | Status: SHIPPED | OUTPATIENT
Start: 2024-05-29 | End: 2024-08-28

## 2024-06-02 DIAGNOSIS — I21.4 NSTEMI (NON-ST ELEVATED MYOCARDIAL INFARCTION) (H): ICD-10-CM

## 2024-06-03 RX ORDER — NITROGLYCERIN 0.4 MG/1
TABLET SUBLINGUAL
Qty: 25 TABLET | Refills: 1 | Status: SHIPPED | OUTPATIENT
Start: 2024-06-03

## 2024-07-26 ENCOUNTER — TELEPHONE (OUTPATIENT)
Dept: FAMILY MEDICINE | Facility: CLINIC | Age: 64
End: 2024-07-26
Payer: MEDICARE

## 2024-07-26 NOTE — TELEPHONE ENCOUNTER
's pharmacist Terrence is following on Dexcom sensor and transmitter order from 05/22/2024.     Per pharmacist, Medicare has not received office visit notes indicating pt has had hypoglycemic episodes.     Medicare needs to be contacted for more information on what is needed before Dexcom order can be approved. Medicare phone number [phone].

## 2024-07-26 NOTE — TELEPHONE ENCOUNTER
Triage called Medicare and was advised to fax last appt note to [phone].     Writer faxed VV note 04/29/2024 to fax number above.

## 2024-08-28 DIAGNOSIS — N95.2 VAGINAL ATROPHY: ICD-10-CM

## 2024-08-28 RX ORDER — ESTRADIOL 0.1 MG/G
CREAM VAGINAL
Qty: 42.5 G | Refills: 4 | Status: SHIPPED | OUTPATIENT
Start: 2024-08-28

## 2024-08-28 NOTE — TELEPHONE ENCOUNTER
Requested Prescriptions   Pending Prescriptions Disp Refills    estradiol (ESTRACE) 0.1 MG/GM vaginal cream [Pharmacy Med Name: ESTRADIOL 0.01% VAG CREAM 42.5GM] 42.5 g      Sig: INSERT 1 GRAM VAGINALLY THREE TIMES WEEKLY       Hormone Replacement Therapy Passed - 8/28/2024  8:05 AM        Passed - Blood pressure under 140/90 in past 12 months     BP Readings from Last 3 Encounters:   05/23/24 122/68   05/16/24 116/62   04/30/24 104/68       No data recorded            Passed - Medication is active on med list        Passed - Recent (12 mo) or future (90 days) visit within the authorizing provider's specialty     The patient must have completed an in-person or virtual visit within the past 12 months or has a future visit scheduled within the next 90 days with the authorizing provider s specialty.  Urgent care and e-visits do not quality as an office visit for this protocol.          Passed - Medication indicated for associated diagnosis     The medication is prescribed for one or more of the following conditions:    Menopause   Vulva/Vaginal atrophy   Low Estrogen   Gender Dysphoria   Male to Female transgender          Passed - Patient is 18 years of age or older        Passed - No active pregnancy on record        Passed - No positive pregnancy test on record in past 12 months       Hormone Replacement Therapy (vaginal) Passed - 8/28/2024  8:05 AM        Passed - Medication is active on med list        Passed - Recent (12 mo) or future (90 days) visit within the authorizing provider's specialty     The patient must have completed an in-person or virtual visit within the past 12 months or has a future visit scheduled within the next 90 days with the authorizing provider s specialty.  Urgent care and e-visits do not quality as an office visit for this protocol.          Passed - Medication indicated for associated diagnosis     Medication is associated with one or more of the following diagnoses:      Menopause   Vulva/Vaginal atrophy   UTI prophylaxis          Passed - Patient is 18 years of age or older        Passed - No active pregnancy on record        Passed - No positive pregnancy test on record in past 12 months           Last Written Prescription Date:  5/29/24  Last Fill Quantity: 1g,  # refills: 1   Last office visit: 5/23/2024 ; last virtual visit: Visit date not found with prescribing provider:  Simona Morejon PA-C   Future Office Visit:      Had EMB on 5/23 - normal    Was given 1g with 1 refill last refill, did you want appt for follow up or ok to refill Rx for year?    Routing to provider to advise    Beata Rodriguez RN on 8/28/2024 at 9:40 AM  [facility] Triage

## 2024-12-05 NOTE — NURSING NOTE
"Chief Complaint   Patient presents with     Diabetes     Flu Shot        Initial /74 (BP Location: Right arm, Patient Position: Chair, Cuff Size: Adult Regular)  Pulse 83  Temp 97.6  F (36.4  C) (Tympanic)  Ht 5' 2.5\" (1.588 m)  Wt 150 lb (68 kg)  SpO2 100%  BMI 27 kg/m2 Estimated body mass index is 27 kg/(m^2) as calculated from the following:    Height as of this encounter: 5' 2.5\" (1.588 m).    Weight as of this encounter: 150 lb (68 kg)..    BP completed using cuff size: regular  MEDICATIONS REVIEWED  SOCIAL AND FAMILY HX REVIEWED  Tiara Vargas CMA  " 36.7

## 2024-12-16 ENCOUNTER — OFFICE VISIT (OUTPATIENT)
Dept: FAMILY MEDICINE | Facility: CLINIC | Age: 64
End: 2024-12-16
Payer: MEDICARE

## 2024-12-16 VITALS
DIASTOLIC BLOOD PRESSURE: 80 MMHG | WEIGHT: 128.7 LBS | HEART RATE: 50 BPM | TEMPERATURE: 97.2 F | HEIGHT: 63 IN | BODY MASS INDEX: 22.8 KG/M2 | SYSTOLIC BLOOD PRESSURE: 131 MMHG | RESPIRATION RATE: 16 BRPM | OXYGEN SATURATION: 99 %

## 2024-12-16 DIAGNOSIS — E11.9 TYPE 2 DIABETES MELLITUS WITHOUT COMPLICATION, WITHOUT LONG-TERM CURRENT USE OF INSULIN (H): ICD-10-CM

## 2024-12-16 DIAGNOSIS — E78.5 HYPERLIPIDEMIA LDL GOAL <70: ICD-10-CM

## 2024-12-16 DIAGNOSIS — I10 ESSENTIAL HYPERTENSION WITH GOAL BLOOD PRESSURE LESS THAN 140/90: ICD-10-CM

## 2024-12-16 DIAGNOSIS — Z00.00 ANNUAL PHYSICAL EXAM: Primary | ICD-10-CM

## 2024-12-16 DIAGNOSIS — I25.10 CORONARY ARTERY DISEASE INVOLVING NATIVE CORONARY ARTERY OF NATIVE HEART WITHOUT ANGINA PECTORIS: ICD-10-CM

## 2024-12-16 DIAGNOSIS — K21.9 GASTROESOPHAGEAL REFLUX DISEASE WITHOUT ESOPHAGITIS: ICD-10-CM

## 2024-12-16 LAB
ALBUMIN SERPL BCG-MCNC: 4.2 G/DL (ref 3.5–5.2)
ALP SERPL-CCNC: 66 U/L (ref 40–150)
ALT SERPL W P-5'-P-CCNC: 32 U/L (ref 0–50)
ANION GAP SERPL CALCULATED.3IONS-SCNC: 11 MMOL/L (ref 7–15)
AST SERPL W P-5'-P-CCNC: 33 U/L (ref 0–45)
BILIRUB SERPL-MCNC: 0.3 MG/DL
BUN SERPL-MCNC: 15.4 MG/DL (ref 8–23)
CALCIUM SERPL-MCNC: 9.8 MG/DL (ref 8.8–10.4)
CHLORIDE SERPL-SCNC: 105 MMOL/L (ref 98–107)
CHOLEST SERPL-MCNC: 140 MG/DL
CREAT SERPL-MCNC: 0.6 MG/DL (ref 0.51–0.95)
CREAT UR-MCNC: 112 MG/DL
EGFRCR SERPLBLD CKD-EPI 2021: >90 ML/MIN/1.73M2
ERYTHROCYTE [DISTWIDTH] IN BLOOD BY AUTOMATED COUNT: 13.2 % (ref 10–15)
EST. AVERAGE GLUCOSE BLD GHB EST-MCNC: 146 MG/DL
FASTING STATUS PATIENT QL REPORTED: YES
FASTING STATUS PATIENT QL REPORTED: YES
GLUCOSE SERPL-MCNC: 164 MG/DL (ref 70–99)
HBA1C MFR BLD: 6.7 % (ref 0–5.6)
HCO3 SERPL-SCNC: 24 MMOL/L (ref 22–29)
HCT VFR BLD AUTO: 38 % (ref 35–47)
HDLC SERPL-MCNC: 67 MG/DL
HGB BLD-MCNC: 12.3 G/DL (ref 11.7–15.7)
LDLC SERPL CALC-MCNC: 58 MG/DL
MCH RBC QN AUTO: 29.1 PG (ref 26.5–33)
MCHC RBC AUTO-ENTMCNC: 32.4 G/DL (ref 31.5–36.5)
MCV RBC AUTO: 90 FL (ref 78–100)
MICROALBUMIN UR-MCNC: <12 MG/L
MICROALBUMIN/CREAT UR: NORMAL MG/G{CREAT}
NONHDLC SERPL-MCNC: 73 MG/DL
PLATELET # BLD AUTO: 228 10E3/UL (ref 150–450)
POTASSIUM SERPL-SCNC: 4.8 MMOL/L (ref 3.4–5.3)
PROT SERPL-MCNC: 7.4 G/DL (ref 6.4–8.3)
RBC # BLD AUTO: 4.22 10E6/UL (ref 3.8–5.2)
SODIUM SERPL-SCNC: 140 MMOL/L (ref 135–145)
TRIGL SERPL-MCNC: 76 MG/DL
WBC # BLD AUTO: 5.5 10E3/UL (ref 4–11)

## 2024-12-16 PROCEDURE — G0439 PPPS, SUBSEQ VISIT: HCPCS | Performed by: INTERNAL MEDICINE

## 2024-12-16 PROCEDURE — 83036 HEMOGLOBIN GLYCOSYLATED A1C: CPT | Performed by: INTERNAL MEDICINE

## 2024-12-16 PROCEDURE — 85027 COMPLETE CBC AUTOMATED: CPT | Performed by: INTERNAL MEDICINE

## 2024-12-16 PROCEDURE — 80053 COMPREHEN METABOLIC PANEL: CPT | Performed by: INTERNAL MEDICINE

## 2024-12-16 PROCEDURE — 80061 LIPID PANEL: CPT | Performed by: INTERNAL MEDICINE

## 2024-12-16 PROCEDURE — 82570 ASSAY OF URINE CREATININE: CPT | Performed by: INTERNAL MEDICINE

## 2024-12-16 PROCEDURE — 36415 COLL VENOUS BLD VENIPUNCTURE: CPT | Performed by: INTERNAL MEDICINE

## 2024-12-16 PROCEDURE — 82043 UR ALBUMIN QUANTITATIVE: CPT | Performed by: INTERNAL MEDICINE

## 2024-12-16 PROCEDURE — 99214 OFFICE O/P EST MOD 30 MIN: CPT | Mod: 25 | Performed by: INTERNAL MEDICINE

## 2024-12-16 SDOH — HEALTH STABILITY: PHYSICAL HEALTH: ON AVERAGE, HOW MANY DAYS PER WEEK DO YOU ENGAGE IN MODERATE TO STRENUOUS EXERCISE (LIKE A BRISK WALK)?: 6 DAYS

## 2024-12-16 ASSESSMENT — PAIN SCALES - GENERAL: PAINLEVEL_OUTOF10: NO PAIN (0)

## 2024-12-16 ASSESSMENT — SOCIAL DETERMINANTS OF HEALTH (SDOH): HOW OFTEN DO YOU GET TOGETHER WITH FRIENDS OR RELATIVES?: MORE THAN THREE TIMES A WEEK

## 2024-12-16 NOTE — PROGRESS NOTES
Preventive Care Visit  [facility] JESSI Bernal MD, Internal Medicine  Dec 16, 2024      Assessment & Plan     Annual physical exam  - COMPREHENSIVE METABOLIC PANEL  - Lipid Profile  - CBC with platelets    Type 2 diabetes mellitus without complication, without long-term current use of insulin (H)  Recent A1c: 7 %   Glucose Control:  average Dexcom reading is 130 which includes fasting and postprandial blood sugars. there is agreeable to  Medications: Metformin and sitagliptin  Lipids: .  LDL at goal      Blood Pressure:  Well-controlled.  Diabetic Nephropathy screening: urine albumin ordered  Diabetic Retinopathy screening: Up-to-date with eye exam   Diabetic Neuropathy screening:  Foot exam done today.  Normal.  Discussed switching to SGLT2 inhibitor due to cardioprotective and renal protective effects.  Side effects discussed.  Patient is not comfortable with the side effects which include frequent urinary tract infections.  Patient  says she will do her own research and inform me of her decision.   - sitagliptin (JANUVIA) 50 MG tablet; Take 1 tablet (50 mg) by mouth daily.  - Albumin Random Urine Quantitative with Creat Ratio  - HEMOGLOBIN A1C    Essential hypertension with goal blood pressure less than 140/90   well-controlled on lisinopril 5 mg daily    Hyperlipidemia LDL goal <70  LDL at goal with Lipitor 40 mg and ezetimibe 10 mg c    Coronary artery disease involving native coronary artery of native heart without angina pectoris  CAD with history of MI in February 2022 s/p mid LAD PCI. No significant coronary disease elsewhere. No new symptoms. Continue ASA 81 mg daily.     Gastroesophageal reflux disease without esophagitis  Stable. Continue medication.      Patient has been advised of split billing requirements and indicates understanding: Yes        Counseling  Appropriate preventive services were addressed with this patient via screening, questionnaire, or discussion as appropriate  for fall prevention, nutrition, physical activity, Tobacco-use cessation, social engagement, weight loss and cognition.  Checklist reviewing preventive services available has been given to the patient.  Reviewed patient's diet, addressing concerns and/or questions.       See Patient Instructions    Subjective   Rubi is a 63 year old, presenting for the following:  Physical      HPI  Rubi Nicolas is here for A1c    She has DM2, has a dexcom and her blood sugars are in good control.     Patient had history of vaginal spotting, she was seen by OB/GYN, ultrasound labs shows thick endometrium at 4.1 cm and a biopsy was done which was negative for hyperplasia or malignancy.  Patient is afraid about thick endometrium and would like to follow-up with OB/GYN.     She is up-to-date with mammogram and colon cancer screening as well as vaccines.        Health Care Directive  Patient does not have a Health Care Directive: Discussed advance care planning with patient; however, patient declined at this time.      12/16/2024   General Health   How would you rate your overall physical health? Excellent   Feel stress (tense, anxious, or unable to sleep) Not at all            12/16/2024   Nutrition   Diet: Diabetic            12/16/2024   Exercise   Days per week of moderate/strenous exercise 6 days            12/16/2024   Social Factors   Frequency of gathering with friends or relatives More than three times a week   Worry food won't last until get money to buy more No   Food not last or not have enough money for food? No   Do you have housing? (Housing is defined as stable permanent housing and does not include staying ouside in a car, in a tent, in an abandoned building, in an overnight shelter, or couch-surfing.) Yes   Are you worried about losing your housing? No   Lack of transportation? No   Unable to get utilities (heat,electricity)? No            12/16/2024   Fall Risk   Fallen 2 or more times in the past year? No     Trouble with walking or balance? No        Patient-reported          12/16/2024   Activities of Daily Living- Home Safety   Needs help with the following daily activites None of the above   Safety concerns in the home None of the above            12/16/2024   Dental   Dentist two times every year? Yes            12/16/2024   Hearing Screening   Hearing concerns? None of the above            12/16/2024   Driving Risk Screening   Patient/family members have concerns about driving No            12/16/2024   General Alertness/Fatigue Screening   Have you been more tired than usual lately? No            12/16/2024   Urinary Incontinence Screening   Bothered by leaking urine in past 6 months No            12/16/2024   TB Screening   Were you born outside of the [place]? No            Today's PHQ-2 Score:       12/16/2024     7:43 AM   PHQ-2 ( 1999 Pfizer)   Q1: Little interest or pleasure in doing things 0    Q2: Feeling down, depressed or hopeless 0    PHQ-2 Score 0    Q1: Little interest or pleasure in doing things Not at all   Q2: Feeling down, depressed or hopeless Not at all   PHQ-2 Score 0       Patient-reported           12/16/2024   Substance Use   Alcohol more than 3/day or more than 7/wk No   Do you have a current opioid prescription? No   How severe/bad is pain from 1 to 10? 0/10 (No Pain)   Do you use any other substances recreationally? No        Social History     Tobacco Use    Smoking status: Never    Smokeless tobacco: Never   Vaping Use    Vaping status: Never Used   Substance Use Topics    Alcohol use: No     Alcohol/week: 0.0 standard drinks of alcohol    Drug use: No           2/20/2024   LAST FHS-7 RESULTS   1st degree relative breast or ovarian cancer No   Any relative bilateral breast cancer No   Any male have breast cancer No   Any ONE woman have BOTH breast AND ovarian cancer No   Any woman with breast cancer before 50yrs No   2 or more relatives with breast AND/OR ovarian cancer No   2 or more  relatives with breast AND/OR bowel cancer No      Mammogram Screening - Mammogram every 1-2 years updated in Health Maintenance based on mutual decision making      History of abnormal Pap smear: No - age 65 or older with adequate negative prior screening test results (3 consecutive negative cytology results, 2 consecutive negative cotesting results, or 2 consecutive negative HrHPV test results within 10 years, with the most recent test occurring within the recommended screening interval for the test used)        Latest Ref Rng & Units 11/7/2022    10:33 AM 6/18/2019     1:46 PM 6/18/2019     1:16 PM   PAP / HPV   PAP  Negative for Intraepithelial Lesion or Malignancy (NILM)      PAP (Historical)    NIL    HPV 16 DNA Negative Negative  Negative     HPV 18 DNA Negative Negative  Negative     Other HR HPV Negative Negative  Negative       ASCVD Risk   The ASCVD Risk score (Suzie ESTRADA, et al., 2019) failed to calculate for the following reasons:    Risk score cannot be calculated because patient has a medical history suggesting prior/existing ASCVD    Normal bone mineral density per dexa in 2019    Reviewed and updated as needed this visit by Provider                  Current providers sharing in care for this patient include:  Patient Care Team:  Carmina Bernal MD as PCP - General (Internal Medicine)  Willow Gilbert RD as Diabetes Educator (Dietitian, Registered)  Carmina Bernal MD as Assigned PCP  Jordy Carmona MD as Assigned Heart and Vascular Provider  Jordy Carmona MD as MD (Cardiovascular Disease)  Simona Morejon PA-C as Assigned OBGYN Provider    The following health maintenance items are reviewed in Epic and correct as of today:  Health Maintenance   Topic Date Due    ANNUAL REVIEW OF HM ORDERS  08/04/2023    MICROALBUMIN  07/05/2024    DIABETIC FOOT EXAM  07/05/2024    A1C  11/02/2024    BMP  12/06/2024    LIPID  12/06/2024    MEDICARE ANNUAL WELLNESS VISIT  12/06/2024    MAMMO SCREENING   "02/20/2025    EYE EXAM  02/22/2025    FALL RISK ASSESSMENT  12/16/2025    ADVANCE CARE PLANNING  12/16/2029    COLORECTAL CANCER SCREENING  02/14/2032    DTAP/TDAP/TD IMMUNIZATION (3 - Td or Tdap) 08/04/2032    DEXA  09/05/2034    HEPATITIS C SCREENING  Completed    HIV SCREENING  Completed    PHQ-2 (once per calendar year)  Completed    INFLUENZA VACCINE  Completed    Pneumococcal Vaccine: 65+ Years  Completed    ZOSTER IMMUNIZATION  Completed    RSV VACCINE  Completed    COVID-19 Vaccine  Completed    HPV IMMUNIZATION  Aged Out    MENINGITIS IMMUNIZATION  Aged Out    RSV MONOCLONAL ANTIBODY  Aged Out    PAP  Discontinued        Objective    Exam  /80 (BP Location: Left arm, Patient Position: Sitting, Cuff Size: Adult Regular)   Pulse 50   Temp 97.2  F (36.2  C) (Oral)   Resp 16   Ht 1.59 m (5' 2.6\")   Wt 58.4 kg (128 lb 11.2 oz)   SpO2 99%   BMI 23.09 kg/m     Estimated body mass index is 23.09 kg/m  as calculated from the following:    Height as of this encounter: 1.59 m (5' 2.6\").    Weight as of this encounter: 58.4 kg (128 lb 11.2 oz).    Physical Exam  Vitals reviewed.   Constitutional:       Appearance: Normal appearance.   HENT:      Right Ear: Tympanic membrane normal. There is no impacted cerumen.      Left Ear: Tympanic membrane normal. There is no impacted cerumen.      Mouth/Throat:      Mouth: Mucous membranes are moist.      Pharynx: Oropharynx is clear. No oropharyngeal exudate or posterior oropharyngeal erythema.   Cardiovascular:      Rate and Rhythm: Normal rate and regular rhythm.      Pulses:           Dorsalis pedis pulses are 3+ on the right side and 1+ on the left side.      Heart sounds: Normal heart sounds. No murmur heard.     No friction rub. No gallop.   Pulmonary:      Effort: Pulmonary effort is normal. No respiratory distress.      Breath sounds: Normal breath sounds. No stridor. No wheezing, rhonchi or rales.   Abdominal:      General: Abdomen is flat. There is no " distension.      Palpations: Abdomen is soft. There is no mass.      Tenderness: There is no abdominal tenderness. There is no guarding.      Hernia: No hernia is present.   Musculoskeletal:         General: Normal range of motion.      Cervical back: Normal range of motion and neck supple. No rigidity or tenderness.   Feet:      Right foot:      Protective Sensation: 10 sites tested.  10 sites sensed.      Skin integrity: Skin integrity normal.      Toenail Condition: Right toenails are normal.      Left foot:      Protective Sensation: 10 sites tested.  10 sites sensed.      Skin integrity: Skin integrity normal.      Toenail Condition: Left toenails are normal.   Lymphadenopathy:      Cervical: No cervical adenopathy.   Skin:     General: Skin is warm and dry.   Neurological:      General: No focal deficit present.      Mental Status: She is alert.                12/16/2024   Mini Cog   Clock Draw Score 2 Normal   3 Item Recall 3 objects recalled   Mini Cog Total Score 5               Signed Electronically by: Carmina Bernal MD

## 2025-01-23 DIAGNOSIS — I10 PRIMARY HYPERTENSION: ICD-10-CM

## 2025-01-23 DIAGNOSIS — E11.9 TYPE 2 DIABETES MELLITUS WITHOUT COMPLICATION, WITHOUT LONG-TERM CURRENT USE OF INSULIN (H): ICD-10-CM

## 2025-01-23 DIAGNOSIS — I21.4 NSTEMI (NON-ST ELEVATED MYOCARDIAL INFARCTION) (H): ICD-10-CM

## 2025-01-23 RX ORDER — ATORVASTATIN CALCIUM 40 MG/1
40 TABLET, FILM COATED ORAL DAILY
Qty: 90 TABLET | Refills: 3 | OUTPATIENT
Start: 2025-01-23

## 2025-01-23 RX ORDER — CARVEDILOL 3.12 MG/1
3.12 TABLET ORAL 2 TIMES DAILY
Qty: 180 TABLET | Refills: 3 | OUTPATIENT
Start: 2025-01-23

## 2025-02-02 ENCOUNTER — MYC MEDICAL ADVICE (OUTPATIENT)
Dept: FAMILY MEDICINE | Facility: CLINIC | Age: 65
End: 2025-02-02
Payer: MEDICARE

## 2025-02-02 DIAGNOSIS — E11.9 TYPE 2 DIABETES MELLITUS WITHOUT COMPLICATION, WITHOUT LONG-TERM CURRENT USE OF INSULIN (H): Primary | ICD-10-CM

## 2025-02-04 ENCOUNTER — PATIENT OUTREACH (OUTPATIENT)
Dept: CARE COORDINATION | Facility: CLINIC | Age: 65
End: 2025-02-04
Payer: MEDICARE

## 2025-02-04 NOTE — TELEPHONE ENCOUNTER
To PCP:    Please review Jardiance request along with pended medication.    Thank you.    Ingris Watkins RN  [facility] Internal Medicine

## 2025-03-25 ENCOUNTER — HOSPITAL ENCOUNTER (OUTPATIENT)
Dept: MAMMOGRAPHY | Facility: CLINIC | Age: 65
Discharge: HOME | End: 2025-03-25
Attending: INTERNAL MEDICINE | Admitting: INTERNAL MEDICINE
Payer: MEDICARE

## 2025-03-25 ENCOUNTER — ANCILLARY ORDERS (OUTPATIENT)
Dept: FAMILY MEDICINE | Facility: CLINIC | Age: 65
End: 2025-03-25

## 2025-03-25 DIAGNOSIS — Z12.31 VISIT FOR SCREENING MAMMOGRAM: Primary | ICD-10-CM

## 2025-03-25 DIAGNOSIS — Z12.31 VISIT FOR SCREENING MAMMOGRAM: ICD-10-CM

## 2025-03-25 PROCEDURE — 77067 SCR MAMMO BI INCL CAD: CPT

## 2025-03-25 PROCEDURE — 77063 BREAST TOMOSYNTHESIS BI: CPT

## 2025-04-15 ENCOUNTER — MYC MEDICAL ADVICE (OUTPATIENT)
Dept: FAMILY MEDICINE | Facility: CLINIC | Age: 65
End: 2025-04-15
Payer: MEDICARE

## 2025-04-15 DIAGNOSIS — E11.9 TYPE 2 DIABETES MELLITUS WITHOUT COMPLICATION, WITHOUT LONG-TERM CURRENT USE OF INSULIN (H): ICD-10-CM

## 2025-04-17 ENCOUNTER — OFFICE VISIT (OUTPATIENT)
Dept: CARDIOLOGY | Facility: CLINIC | Age: 65
End: 2025-04-17
Payer: MEDICARE

## 2025-04-17 VITALS
SYSTOLIC BLOOD PRESSURE: 116 MMHG | HEIGHT: 63 IN | WEIGHT: 127 LBS | BODY MASS INDEX: 22.5 KG/M2 | HEART RATE: 55 BPM | DIASTOLIC BLOOD PRESSURE: 72 MMHG

## 2025-04-17 DIAGNOSIS — I21.4 NSTEMI (NON-ST ELEVATED MYOCARDIAL INFARCTION) (H): ICD-10-CM

## 2025-04-17 NOTE — LETTER
4/17/2025    Carmina Bernal MD  [address] [place] [address]    RE: Rubi Nicolas       Dear Colleague,     I had the pleasure of seeing Rubi Nicolas in the [facility].  HPI and Plan:   Ms Nicolas is a very pleasant and delightful 64-year-old female who I saw in February 2022 when she was admitted with myocardial infarction and underwent successful PCI of the mid LAD.  She also had a vasovagal event leading to bradycardia and was seen bilateral physiology and no intervention was indicated.  She was subsequently started on low-dose beta-blocker and did well.  Overall cardiac status wise she has done well since that time.  Today she is coming for routine follow-up.  Echocardiogram had shown normal LV function.  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  She is fairly active she is kayaking biking without any exertional symptoms like chest discomfort or shortness of breath or dizziness presyncope or syncope.  LDL is well-controlled at 58.  She does not use any tobacco or any alcohol.  Kidney functions were also normal.  She is on baby aspirin, carvedilol, Zetia, lisinopril.  She has diabetes and hemoglobin A1c is 6.7      Assessment and plan  CAD with history of MI in February 2022 status post LAD PCI.  No significant CAD elsewhere.  Clinically doing well without any anginal symptoms.  On aspirin, high intensity statin, beta-blocker, ACE inhibitor.  Blood pressure LDL both well-controlled.  No tobacco exposure.  Dyslipidemia LDL controlled on Zetia and Lipitor  Hypertension controlled on lisinopril and carvedilol    Recommendations  Overall cardiac data says she is doing well.  Continue current cardiac medications  Follow-up in a year, sooner if she notes any change in clinical status.    Orders Placed This Encounter   Procedures     Follow-Up with Cardiology       No orders of the defined types were placed in this  encounter.      There are no discontinued medications.      Encounter Diagnosis   Name Primary?     NSTEMI (non-ST elevated myocardial infarction) (H)        CURRENT MEDICATIONS:  Current Outpatient Medications   Medication Sig Dispense Refill     aspirin (ASA) 81 MG EC tablet Take 1 tablet (81 mg) by mouth daily 90 tablet 3     atorvastatin (LIPITOR) 40 MG tablet Take 1 tablet (40 mg) by mouth daily. 90 tablet 3     carvedilol (COREG) 3.125 MG tablet Take 1 tablet (3.125 mg) by mouth 2 times daily (with meals). Appointment needed for further refills 180 tablet 3     Coenzyme Q10 (CO Q 10 PO)        Continuous Blood Gluc  (FREESTYLE ADRIANA 14 DAY READER) ROSALIE USE 1 DEVICE CONTINUOUSLY 1 each 0     Continuous Blood Gluc Sensor (FREESTYLE ADRIANA 14 DAY SENSOR) MISC USE ONE EVERY 14 DAYS 2 each 11     Continuous Glucose Sensor (DEXCOM G6 SENSOR) MISC Change every 10 days. 3 each 5     Continuous Glucose Transmitter (DEXCOM G6 TRANSMITTER) MISC Change every 3 months. 1 each 1     estradiol (ESTRACE) 0.1 MG/GM vaginal cream INSERT 1 GRAM VAGINALLY THREE TIMES WEEKLY 42.5 g 4     ezetimibe (ZETIA) 10 MG tablet Take 1 tablet (10 mg) by mouth daily 90 tablet 3     lisinopril (ZESTRIL) 5 MG tablet Take 1 tablet (5 mg) by mouth daily 90 tablet 3     metFORMIN (GLUCOPHAGE) 1000 MG tablet Take 1 tablet (1,000 mg) by mouth 2 times daily (with meals). 180 tablet 3     Microlet Lancets MISC 1 lancet daily 100 each 4     Multiple Vitamin (MULTIVITAMIN OR) Take 0.5 tablets by mouth daily       nitroGLYcerin (NITROSTAT) 0.4 MG sublingual tablet DISSOLVE ONE TABLET UNDER TONGUE AS NEEDED FOR CHEST PAIN EVERY 5 MINUTES FOR 3 DOSES, IF SYMPTOMS PERSIST 5 MINUTES AFTER 1ST DOSE CALL 911 25 tablet 1     omeprazole (PRILOSEC) 20 MG DR capsule TAKE ONE CAPSULE BY MOUTH TWICE A WEEK ON MONDAYS AND THURSDSAYS 30 capsule 3     sitagliptin (JANUVIA) 50 MG tablet Take 1 tablet (50 mg) by mouth daily. 90 tablet 3     vitamin B-Complex Take  1 tablet by mouth daily         ALLERGIES     Allergies   Allergen Reactions     Keflex [Cephalexin Hcl] Hives     Penicillins Hives     Ragweeds      Sneezing etc.     Cephalosporins Rash       PAST MEDICAL HISTORY:  Past Medical History:   Diagnosis Date     Atypical squamous cells of undetermined significance (ASCUS) on Papanicolaou smear of cervix 10/9/2015     GERD (gastroesophageal reflux disease)      HTN (hypertension)      Hyperlipidemia      Type 2 diabetes mellitus without complications (H) 10/22/2015       PAST SURGICAL HISTORY:  Past Surgical History:   Procedure Laterality Date     APPENDECTOMY  1976    at time of USO     ARTHROSCOPY KNEE RT/LT       COLONOSCOPY N/A 11/9/2015    Procedure: COLONOSCOPY;  Surgeon: Kate Redmond MD;  Location:  GI     COLONOSCOPY N/A 2/14/2022    Procedure: COLONOSCOPY;  Surgeon: Kate Redmond MD;  Location:  GI     CV HEART CATHETERIZATION WITH POSSIBLE INTERVENTION N/A 2/28/2022    Procedure: Heart Catheterization with Possible Intervention;  Surgeon: Jh Powell MD;  Location:  HEART CARDIAC CATH LAB     Z LAP OVARIAN CYSTOTOMY  1976    salpingooopherectomy for large dermoid       FAMILY HISTORY:  Family History   Problem Relation Age of Onset     Diabetes Mother 60        type 2     Hypertension Mother      Dementia Mother 90     Heart Disease Mother      Hyperlipidemia Mother      Hypertension Father      Cerebrovascular Disease Father      Brain Cancer Father 68     Hyperlipidemia Father      Hypertension Sister      Diabetes Maternal Uncle         type 1       SOCIAL HISTORY:  Social History     Socioeconomic History     Marital status:      Spouse name: None     Number of children: None     Years of education: None     Highest education level: None   Tobacco Use     Smoking status: Never     Smokeless tobacco: Never   Vaping Use     Vaping status: Never Used   Substance and Sexual Activity     Alcohol use: No      Alcohol/week: 0.0 standard drinks of alcohol     Drug use: No     Sexual activity: Not Currently     Partners: Male     Birth control/protection: Post-menopausal   Other Topics Concern     Parent/sibling w/ CABG, MI or angioplasty before 65F 55M? 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    ETOH- none Exercise-  1 hr x 5 /week- Walks brisk, CC sk     Social Drivers of Health     Financial Resource Strain: Low Risk  (12/16/2024)    Financial Resource Strain      Within the past 12 months, have you or your family members you live with been unable to get utilities (heat, electricity) when it was really needed?: No   Food Insecurity: Low Risk  (12/16/2024)    Food Insecurity      Within the past 12 months, did you worry that your food would run out before you got money to buy more?: No      Within the past 12 months, did the food you bought just not last and you didn t have money to get more?: No   Transportation Needs: Low Risk  (12/16/2024)    Transportation Needs      Within the past 12 months, has lack of transportation kept you from medical appointments, getting your medicines, non-medical meetings or appointments, work, or from getting things that you need?: No   Physical Activity: Unknown (12/16/2024)    Exercise Vital Sign      Days of Exercise per Week: 6 days   Stress: No Stress Concern Present (12/16/2024)    [employer] [employer] [place] [employer] - Occupational Stress Questionnaire      Feeling of Stress : Not at all   Social Connections: Unknown (12/16/2024)    Social Connection and Isolation Panel [NHANES]      Frequency of Social Gatherings with Friends and Family: More than three times a week   Interpersonal Safety: Low Risk  (12/16/2024)    Interpersonal Safety      Do you feel physically and emotionally safe where you currently live?: Yes      Within the past 12 months, have you been hit, slapped,  "kicked or otherwise physically hurt by someone?: No      Within the past 12 months, have you been humiliated or emotionally abused in other ways by your partner or ex-partner?: No   Housing Stability: Low Risk  (12/16/2024)    Housing Stability      Do you have housing? : Yes      Are you worried about losing your housing?: No       Review of Systems:  Skin:          Eyes:         ENT:         Respiratory:  Negative       Cardiovascular:  Negative      Gastroenterology:        Genitourinary:         Musculoskeletal:         Neurologic:         Psychiatric:         Heme/Lymph/Imm:         Endocrine:           Physical Exam:  Vitals: /72   Pulse 55   Ht 1.588 m (5' 2.5\")   Wt 57.6 kg (127 lb)   BMI 22.86 kg/m      General Patient appears comfortable  Neck normal JVP  Cardiovascular system S1-S2 normal no murmur rub or gallop  Respiratory system clear to auscultation  Extremities no edema      CC  Jordy Carmona MD  [address]  [place]  [place] [id number]                      Thank you for allowing me to participate in the care of your patient.      Sincerely,     Jordy Carmona MD     [facility]  cc:   Jordy Carmona MD  [address]  [place] [id number]      "

## 2025-04-17 NOTE — PROGRESS NOTES
HPI and Plan:   Ms Nicolas is a very pleasant and delightful 64-year-old female who I saw in February 2022 when she was admitted with myocardial infarction and underwent successful PCI of the mid LAD.  She also had a vasovagal event leading to bradycardia and was seen bilateral physiology and no intervention was indicated.  She was subsequently started on low-dose beta-blocker and did well.  Overall cardiac status wise she has done well since that time.  Today she is coming for routine follow-up.  Echocardiogram had shown normal LV function.  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  She is fairly active she is kayaking biking without any exertional symptoms like chest discomfort or shortness of breath or dizziness presyncope or syncope.  LDL is well-controlled at 58.  She does not use any tobacco or any alcohol.  Kidney functions were also normal.  She is on baby aspirin, carvedilol, Zetia, lisinopril.  She has diabetes and hemoglobin A1c is 6.7      Assessment and plan  CAD with history of MI in February 2022 status post LAD PCI.  No significant CAD elsewhere.  Clinically doing well without any anginal symptoms.  On aspirin, high intensity statin, beta-blocker, ACE inhibitor.  Blood pressure LDL both well-controlled.  No tobacco exposure.  Dyslipidemia LDL controlled on Zetia and Lipitor  Hypertension controlled on lisinopril and carvedilol    Recommendations  Overall cardiac data says she is doing well.  Continue current cardiac medications  Follow-up in a year, sooner if she notes any change in clinical status.    Orders Placed This Encounter   Procedures    Follow-Up with Cardiology       No orders of the defined types were placed in this encounter.      There are no discontinued medications.      Encounter Diagnosis   Name Primary?    NSTEMI (non-ST elevated myocardial infarction) (H)        CURRENT MEDICATIONS:  Current Outpatient Medications   Medication Sig  Dispense Refill    aspirin (ASA) 81 MG EC tablet Take 1 tablet (81 mg) by mouth daily 90 tablet 3    atorvastatin (LIPITOR) 40 MG tablet Take 1 tablet (40 mg) by mouth daily. 90 tablet 3    carvedilol (COREG) 3.125 MG tablet Take 1 tablet (3.125 mg) by mouth 2 times daily (with meals). Appointment needed for further refills 180 tablet 3    Coenzyme Q10 (CO Q 10 PO)       Continuous Blood Gluc  (FREESTYLE ADRIANA 14 DAY READER) ROSALIE USE 1 DEVICE CONTINUOUSLY 1 each 0    Continuous Blood Gluc Sensor (FREESTYLE ADRIANA 14 DAY SENSOR) MISC USE ONE EVERY 14 DAYS 2 each 11    Continuous Glucose Sensor (DEXCOM G6 SENSOR) MISC Change every 10 days. 3 each 5    Continuous Glucose Transmitter (DEXCOM G6 TRANSMITTER) MISC Change every 3 months. 1 each 1    estradiol (ESTRACE) 0.1 MG/GM vaginal cream INSERT 1 GRAM VAGINALLY THREE TIMES WEEKLY 42.5 g 4    ezetimibe (ZETIA) 10 MG tablet Take 1 tablet (10 mg) by mouth daily 90 tablet 3    lisinopril (ZESTRIL) 5 MG tablet Take 1 tablet (5 mg) by mouth daily 90 tablet 3    metFORMIN (GLUCOPHAGE) 1000 MG tablet Take 1 tablet (1,000 mg) by mouth 2 times daily (with meals). 180 tablet 3    Microlet Lancets MISC 1 lancet daily 100 each 4    Multiple Vitamin (MULTIVITAMIN OR) Take 0.5 tablets by mouth daily      nitroGLYcerin (NITROSTAT) 0.4 MG sublingual tablet DISSOLVE ONE TABLET UNDER TONGUE AS NEEDED FOR CHEST PAIN EVERY 5 MINUTES FOR 3 DOSES, IF SYMPTOMS PERSIST 5 MINUTES AFTER 1ST DOSE CALL 911 25 tablet 1    omeprazole (PRILOSEC) 20 MG DR capsule TAKE ONE CAPSULE BY MOUTH TWICE A WEEK ON MONDAYS AND THURSDSAYS 30 capsule 3    sitagliptin (JANUVIA) 50 MG tablet Take 1 tablet (50 mg) by mouth daily. 90 tablet 3    vitamin B-Complex Take 1 tablet by mouth daily         ALLERGIES     Allergies   Allergen Reactions    Keflex [Cephalexin Hcl] Hives    Penicillins Hives    Ragweeds      Sneezing etc.    Cephalosporins Rash       PAST MEDICAL HISTORY:  Past Medical History:    Diagnosis Date    Atypical squamous cells of undetermined significance (ASCUS) on Papanicolaou smear of cervix 10/9/2015    GERD (gastroesophageal reflux disease)     HTN (hypertension)     Hyperlipidemia     Type 2 diabetes mellitus without complications (H) 10/22/2015       PAST SURGICAL HISTORY:  Past Surgical History:   Procedure Laterality Date    APPENDECTOMY  1976    at time of USO    ARTHROSCOPY KNEE RT/LT      COLONOSCOPY N/A 11/9/2015    Procedure: COLONOSCOPY;  Surgeon: Kate Redmond MD;  Location:  GI    COLONOSCOPY N/A 2/14/2022    Procedure: COLONOSCOPY;  Surgeon: Kate Redmond MD;  Location:  GI    CV HEART CATHETERIZATION WITH POSSIBLE INTERVENTION N/A 2/28/2022    Procedure: Heart Catheterization with Possible Intervention;  Surgeon: Jh Powell MD;  Location:  HEART CARDIAC CATH LAB    [facility] LAP OVARIAN CYSTOTOMY  1976    salpingooopherectomy for large dermoid       FAMILY HISTORY:  Family History   Problem Relation Age of Onset    Diabetes Mother 60        type 2    Hypertension Mother     Dementia Mother 90    Heart Disease Mother     Hyperlipidemia Mother     Hypertension Father     Cerebrovascular Disease Father     Brain Cancer Father 68    Hyperlipidemia Father     Hypertension Sister     Diabetes Maternal Uncle         type 1       SOCIAL HISTORY:  Social History     Socioeconomic History    Marital status:      Spouse name: None    Number of children: None    Years of education: None    Highest education level: None   Tobacco Use    Smoking status: Never    Smokeless tobacco: Never   Vaping Use    Vaping status: Never Used   Substance and Sexual Activity    Alcohol use: No     Alcohol/week: 0.0 standard drinks of alcohol    Drug use: No    Sexual activity: Not Currently     Partners: Male     Birth control/protection: Post-menopausal   Other Topics Concern    Parent/sibling w/ CABG, MI or angioplasty before 65F 55M? No   Social History Narrative     10/2012  to HS sweet heart Children- 2 (College age)    Work- mental health con for childrenTobacco- none    ETOH- none Exercise-  1 hr x 5 /week- Walks brisk, CC sk     Social Drivers of Health     Financial Resource Strain: Low Risk  (12/16/2024)    Financial Resource Strain     Within the past 12 months, have you or your family members you live with been unable to get utilities (heat, electricity) when it was really needed?: No   Food Insecurity: Low Risk  (12/16/2024)    Food Insecurity     Within the past 12 months, did you worry that your food would run out before you got money to buy more?: No     Within the past 12 months, did the food you bought just not last and you didn t have money to get more?: No   Transportation Needs: Low Risk  (12/16/2024)    Transportation Needs     Within the past 12 months, has lack of transportation kept you from medical appointments, getting your medicines, non-medical meetings or appointments, work, or from getting things that you need?: No   Physical Activity: Unknown (12/16/2024)    Exercise Vital Sign     Days of Exercise per Week: 6 days   Stress: No Stress Concern Present (12/16/2024)    [place] [employer] - Occupational Stress Questionnaire     Feeling of Stress : Not at all   Social Connections: Unknown (12/16/2024)    Social Connection and Isolation Panel [NHANES]     Frequency of Social Gatherings with Friends and Family: More than three times a week   Interpersonal Safety: Low Risk  (12/16/2024)    Interpersonal Safety     Do you feel physically and emotionally safe where you currently live?: Yes     Within the past 12 months, have you been hit, slapped, kicked or otherwise physically hurt by someone?: No     Within the past 12 months, have you been humiliated or emotionally abused in other ways by your partner or ex-partner?: No   Housing Stability: Low Risk  (12/16/2024)    Housing Stability     Do you have housing? : Yes     Are you  "worried about losing your housing?: No       Review of Systems:  Skin:          Eyes:         ENT:         Respiratory:  Negative       Cardiovascular:  Negative      Gastroenterology:        Genitourinary:         Musculoskeletal:         Neurologic:         Psychiatric:         Heme/Lymph/Imm:         Endocrine:           Physical Exam:  Vitals: /72   Pulse 55   Ht 1.588 m (5' 2.5\")   Wt 57.6 kg (127 lb)   BMI 22.86 kg/m      General Patient appears comfortable  Neck normal JVP  Cardiovascular system S1-S2 normal no murmur rub or gallop  Respiratory system clear to auscultation  Extremities no edema      FAWN Carmona MD  [address]  [place] [id number]                    "

## 2025-04-22 DIAGNOSIS — I10 ESSENTIAL HYPERTENSION WITH GOAL BLOOD PRESSURE LESS THAN 140/90: ICD-10-CM

## 2025-04-22 DIAGNOSIS — I21.4 NSTEMI (NON-ST ELEVATED MYOCARDIAL INFARCTION) (H): ICD-10-CM

## 2025-04-22 DIAGNOSIS — I10 PRIMARY HYPERTENSION: ICD-10-CM

## 2025-04-22 DIAGNOSIS — I25.10 CORONARY ARTERY DISEASE INVOLVING NATIVE CORONARY ARTERY OF NATIVE HEART WITHOUT ANGINA PECTORIS: ICD-10-CM

## 2025-04-22 RX ORDER — LISINOPRIL 5 MG/1
5 TABLET ORAL DAILY
Qty: 90 TABLET | Refills: 1 | Status: SHIPPED | OUTPATIENT
Start: 2025-04-22

## 2025-04-22 RX ORDER — EZETIMIBE 10 MG/1
10 TABLET ORAL DAILY
Qty: 90 TABLET | Refills: 1 | Status: SHIPPED | OUTPATIENT
Start: 2025-04-22

## 2025-04-28 ENCOUNTER — TRANSFERRED RECORDS (OUTPATIENT)
Dept: HEALTH INFORMATION MANAGEMENT | Facility: CLINIC | Age: 65
End: 2025-04-28
Payer: MEDICARE

## 2025-06-30 ENCOUNTER — OFFICE VISIT (OUTPATIENT)
Dept: FAMILY MEDICINE | Facility: CLINIC | Age: 65
End: 2025-06-30
Payer: MEDICARE

## 2025-06-30 VITALS
BODY MASS INDEX: 22.75 KG/M2 | DIASTOLIC BLOOD PRESSURE: 78 MMHG | OXYGEN SATURATION: 99 % | TEMPERATURE: 97.3 F | SYSTOLIC BLOOD PRESSURE: 138 MMHG | WEIGHT: 128.4 LBS | HEART RATE: 53 BPM | HEIGHT: 63 IN | RESPIRATION RATE: 16 BRPM

## 2025-06-30 DIAGNOSIS — E11.9 TYPE 2 DIABETES MELLITUS WITHOUT COMPLICATION, WITHOUT LONG-TERM CURRENT USE OF INSULIN (H): Primary | ICD-10-CM

## 2025-06-30 DIAGNOSIS — I21.4 NSTEMI (NON-ST ELEVATED MYOCARDIAL INFARCTION) (H): ICD-10-CM

## 2025-06-30 DIAGNOSIS — I25.10 CORONARY ARTERY DISEASE INVOLVING NATIVE CORONARY ARTERY OF NATIVE HEART WITHOUT ANGINA PECTORIS: ICD-10-CM

## 2025-06-30 LAB
EST. AVERAGE GLUCOSE BLD GHB EST-MCNC: 148 MG/DL
HBA1C MFR BLD: 6.8 % (ref 0–5.6)

## 2025-06-30 PROCEDURE — 1126F AMNT PAIN NOTED NONE PRSNT: CPT | Performed by: INTERNAL MEDICINE

## 2025-06-30 PROCEDURE — 3044F HG A1C LEVEL LT 7.0%: CPT | Performed by: INTERNAL MEDICINE

## 2025-06-30 PROCEDURE — 36415 COLL VENOUS BLD VENIPUNCTURE: CPT | Performed by: INTERNAL MEDICINE

## 2025-06-30 PROCEDURE — 3075F SYST BP GE 130 - 139MM HG: CPT | Performed by: INTERNAL MEDICINE

## 2025-06-30 PROCEDURE — 3078F DIAST BP <80 MM HG: CPT | Performed by: INTERNAL MEDICINE

## 2025-06-30 PROCEDURE — 99214 OFFICE O/P EST MOD 30 MIN: CPT | Performed by: INTERNAL MEDICINE

## 2025-06-30 PROCEDURE — 83036 HEMOGLOBIN GLYCOSYLATED A1C: CPT | Performed by: INTERNAL MEDICINE

## 2025-06-30 ASSESSMENT — PAIN SCALES - GENERAL: PAINLEVEL_OUTOF10: NO PAIN (0)

## 2025-07-02 ENCOUNTER — RESULTS FOLLOW-UP (OUTPATIENT)
Dept: FAMILY MEDICINE | Facility: CLINIC | Age: 65
End: 2025-07-02

## 2025-07-03 ENCOUNTER — TRANSFERRED RECORDS (OUTPATIENT)
Dept: HEALTH INFORMATION MANAGEMENT | Facility: CLINIC | Age: 65
End: 2025-07-03
Payer: MEDICARE

## 2025-07-17 DIAGNOSIS — K21.9 GASTROESOPHAGEAL REFLUX DISEASE WITHOUT ESOPHAGITIS: ICD-10-CM

## 2025-07-17 RX ORDER — OMEPRAZOLE 20 MG/1
CAPSULE, DELAYED RELEASE ORAL
Qty: 30 CAPSULE | Refills: 2 | Status: SHIPPED | OUTPATIENT
Start: 2025-07-17

## 2025-07-24 ENCOUNTER — TRANSFERRED RECORDS (OUTPATIENT)
Dept: HEALTH INFORMATION MANAGEMENT | Facility: CLINIC | Age: 65
End: 2025-07-24
Payer: MEDICARE

## 2025-08-28 DIAGNOSIS — I25.10 CORONARY ARTERY DISEASE INVOLVING NATIVE CORONARY ARTERY OF NATIVE HEART WITHOUT ANGINA PECTORIS: ICD-10-CM

## 2025-08-28 DIAGNOSIS — E11.9 TYPE 2 DIABETES MELLITUS WITHOUT COMPLICATION, WITHOUT LONG-TERM CURRENT USE OF INSULIN (H): ICD-10-CM

## 2025-08-28 DIAGNOSIS — I21.4 NSTEMI (NON-ST ELEVATED MYOCARDIAL INFARCTION) (H): ICD-10-CM

## 2025-09-02 ENCOUNTER — TELEPHONE (OUTPATIENT)
Dept: FAMILY MEDICINE | Facility: CLINIC | Age: 65
End: 2025-09-02
Payer: MEDICARE

## 2025-09-02 RX ORDER — SEMAGLUTIDE 0.68 MG/ML
0.5 INJECTION, SOLUTION SUBCUTANEOUS
Qty: 3 ML | Refills: 2 | OUTPATIENT
Start: 2025-09-02

## (undated) DEVICE — RAD INFLATOR BASIC COMPAK  IN4130

## (undated) DEVICE — GUIDEWIRE VASC 0.014INX180CM RUNTHROUGH 25-1011

## (undated) DEVICE — MANIFOLD KIT ANGIO AUTOMATED 014613

## (undated) DEVICE — CATH JACKY 5FR 3.5 CURVE 40-5023

## (undated) DEVICE — CATH GUIDELINER 6FR 5571

## (undated) DEVICE — SLEEVE TR BAND RADIAL COMPRESSION DEVICE 24CM TRB24-REG

## (undated) DEVICE — CATH BALLOON NC EUPHORA 3.00X15MM NCEUP3015X

## (undated) DEVICE — WIRE GUIDE 0.035"X260CM SAFE-T-J EXCHANGE G00517

## (undated) DEVICE — CATH TURNPIKE LP 150CM

## (undated) DEVICE — INTRO GLIDESHEATH SLENDER 6FR 10X45CM 60-1060

## (undated) DEVICE — KIT LG BORE TOUHY ACCESS PLUS MAP152

## (undated) DEVICE — Device

## (undated) DEVICE — GW VASC OMNIWIRE J L185CM PRESSURE 89185J

## (undated) DEVICE — CATH RX TAKERU PTCA BALLOON 1.5X12MM DC-RY1512UA1

## (undated) DEVICE — CATH RX TAKERU PTCA BALLOON 2.00MM X 12MM

## (undated) DEVICE — DEFIB PRO-PADZ LVP LQD GEL ADULT 8900-2105-01

## (undated) DEVICE — KIT HAND CONTROL ANGIOTOUCH ACIST 65CM AT-P65

## (undated) DEVICE — CATH BALLOON NC EUPHORA 2.5X15MM NCEUP2515X

## (undated) DEVICE — CATH ANGIO INFINITI JL3.5 4FRX100CM 538418

## (undated) DEVICE — CATH BALLOON EMERGE 2.5X15MM H7493918915250

## (undated) DEVICE — CATH BALLOON EMERGE 2.5X12MM H7493918912250

## (undated) DEVICE — TOTE ANGIO CORP PC15AT SAN32CC83O

## (undated) DEVICE — CATH BALLOON NC EMERGE 2.50X20MM H7493926720250

## (undated) DEVICE — CATH LAUNCHER 6FR EBU 30 LA6EBU30

## (undated) RX ORDER — FENTANYL CITRATE 50 UG/ML
INJECTION, SOLUTION INTRAMUSCULAR; INTRAVENOUS
Status: DISPENSED
Start: 2022-02-28

## (undated) RX ORDER — HEPARIN SODIUM 200 [USP'U]/100ML
INJECTION, SOLUTION INTRAVENOUS
Status: DISPENSED
Start: 2022-02-28

## (undated) RX ORDER — VERAPAMIL HYDROCHLORIDE 2.5 MG/ML
INJECTION, SOLUTION INTRAVENOUS
Status: DISPENSED
Start: 2022-02-28

## (undated) RX ORDER — LIDOCAINE HYDROCHLORIDE 10 MG/ML
INJECTION, SOLUTION EPIDURAL; INFILTRATION; INTRACAUDAL; PERINEURAL
Status: DISPENSED
Start: 2022-02-28

## (undated) RX ORDER — HEPARIN SODIUM 1000 [USP'U]/ML
INJECTION, SOLUTION INTRAVENOUS; SUBCUTANEOUS
Status: DISPENSED
Start: 2022-02-28

## (undated) RX ORDER — FENTANYL CITRATE 50 UG/ML
INJECTION, SOLUTION INTRAMUSCULAR; INTRAVENOUS
Status: DISPENSED
Start: 2022-02-14

## (undated) RX ORDER — NITROGLYCERIN 5 MG/ML
VIAL (ML) INTRAVENOUS
Status: DISPENSED
Start: 2022-02-28